# Patient Record
Sex: MALE | Race: OTHER | Employment: FULL TIME | URBAN - METROPOLITAN AREA
[De-identification: names, ages, dates, MRNs, and addresses within clinical notes are randomized per-mention and may not be internally consistent; named-entity substitution may affect disease eponyms.]

---

## 2018-12-20 PROBLEM — J84.10 PULMONARY FIBROSIS (HCC): Status: ACTIVE | Noted: 2018-12-20

## 2018-12-20 PROBLEM — J84.89 INTERSTITIAL PNEUMONITIS (HCC): Status: ACTIVE | Noted: 2018-12-20

## 2018-12-20 PROBLEM — R05.3 CHRONIC COUGH: Status: ACTIVE | Noted: 2018-12-20

## 2020-03-28 ENCOUNTER — HOSPITAL ENCOUNTER (EMERGENCY)
Age: 65
Discharge: HOME | End: 2020-03-28
Attending: EMERGENCY MEDICINE
Payer: MEDICARE

## 2020-03-28 VITALS
DIASTOLIC BLOOD PRESSURE: 94 MMHG | BODY MASS INDEX: 25.65 KG/M2 | WEIGHT: 158.9 LBS | OXYGEN SATURATION: 98 % | HEART RATE: 96 BPM | TEMPERATURE: 98.5 F | RESPIRATION RATE: 17 BRPM | SYSTOLIC BLOOD PRESSURE: 171 MMHG

## 2020-03-28 PROCEDURE — 99282 EMERGENCY DEPT VISIT SF MDM: CPT

## 2020-03-29 NOTE — ED PROVIDER NOTES
[facility] EMERGENCY DEPT VISIT      Patient Identification  Jenna Elizabeth is a 75 y.o. male. Chief Complaint   Chills (chills started two hours ago/cough x 2 years)      History of Present Illness: This is a  75 y.o. male who presents ambulatory  to the ED with complaints of chills for a few hours. No fever. No headache. No body aches. Has had chronic cough for 2 years which is unchanged today. No sputum production. He has seen pulmonary, ENT, and GI in the past for his chronic cough. He has had PFTs with restrictive defect and CT chest with likely interstitial lung disease. Has not seen pulmonary in 1.5 years. Today he Feels a little short of breath. No chest pain. He is worried about coronavirus. Past Medical History:   Diagnosis Date    Amputation of leg below knee, right, traumatic, complicated, subsequent encounter        History reviewed. No pertinent surgical history. No current facility-administered medications for this encounter. No current outpatient medications on file. No Known Allergies    Social History     Socioeconomic History    Marital status:      Spouse name: Not on file    Number of children: Not on file    Years of education: Not on file    Highest education level: Not on file   Occupational History    Occupation: motel owner    Social Needs    Financial resource strain: Not on file    Food insecurity     Worry: Not on file     Inability: Not on file   [employer] needs     Medical: Not on file     Non-medical: Not on file   Tobacco Use    Smoking status: Never Smoker    Smokeless tobacco: Never Used   Substance and Sexual Activity    Alcohol use:  Yes     Alcohol/week: 1.0 standard drinks     Types: 1 Cans of beer per week    Drug use: No    Sexual activity: Yes     Partners: Female   Lifestyle    Physical activity     Days per week: Not on file     Minutes per session: Not on file    Stress: Not on file   Relationships    Social connections positive bowel sounds. abdomen is nontender. MUSCULOSKELETAL:  Active range of motion of the upper and lower extremities. No edema. Right BKA  NEUROLOGICAL: Awake, alert and oriented x 3. Power intact in the upper and lower extremities. DERMATOLOGIC: No petechiae, rashes, or ecchymoses. ED COURSE AND MEDICAL DECISION MAKING:      Treatment in the department:  Patient received no meds while in the ED. Medical decision making:  Patient presents with chills for a few hours. No other new symptoms. Has chronic cough which has NOT worsened. Mild shortness of breath however he is satting 98% on room air and has no bronchospasm. No known contacts with COVID. No travel. He is over 72 and may have chronic interstitial pneumonitis (although he is on no treatment for this and no longer seeing pulmonary) so has some high risk features. However since he has no fever or worsening cough (no coughing occurred during entire visit) I do not feel he meets criteria for testing. He was asked to monitor symptoms and isolate himself for next several days and return for worsening symptoms. Given the best available information and clinical assessment, I estimate the risk of hospitalization to be greater than the risk of treatment at home. I have explained to the patient that the risk could rapidly change, given precautions for return and instructions on how to minimize being contagious. I estimate there is LOW risk for PULMONARY EMBOLISM, ACUTE CORONARY SYNDROME, CHF, PNEUMONIA, PNEUMOTHORAX,RESPIRATORY FAILURE,  thus I consider the discharge disposition reasonable. Alejandra Nichols and I have discussed the diagnosis and risks, and we agree with discharging home to follow-up with their primary doctor. We also discussed returning to the Emergency Department immediately if new or worsening symptoms occur. Clinical Impression:  1. Chills (without fever)    2.  Chronic cough    3. Elevated blood-pressure reading without diagnosis of hypertension        Dispo:  Patient will be discharged  at this time. Patient was informed of this decision and agrees with plan. I have discussed lab and xray findings with patient and they understand. Questions were answered to the best of my ability. Discharge vitals:  Blood pressure (!) 171/94, pulse 103, temperature 98.5 °F (36.9 °C), temperature source Oral, resp. rate 17, weight 72.1 kg (158 lb 14.4 oz), SpO2 98 %. Prescriptions given:   New Prescriptions    No medications on file         This chart was created using dragon voice recognition software.         Sana Vick MD  03/28/20 3065

## 2020-03-30 ENCOUNTER — CARE COORDINATION (OUTPATIENT)
Dept: CARE COORDINATION | Age: 65
End: 2020-03-30

## 2020-03-30 NOTE — CARE COORDINATION
Call to patient , unable to LM , received message \" user busy \" when attempting to call   Mobile number was wrong number.

## 2020-03-31 ENCOUNTER — CARE COORDINATION (OUTPATIENT)
Dept: CARE COORDINATION | Age: 65
End: 2020-03-31

## 2021-04-07 ENCOUNTER — HOSPITAL ENCOUNTER (EMERGENCY)
Age: 66
Discharge: HOME | End: 2021-04-07
Attending: EMERGENCY MEDICINE
Payer: MEDICARE

## 2021-04-07 ENCOUNTER — APPOINTMENT (OUTPATIENT)
Dept: GENERAL RADIOLOGY | Age: 66
End: 2021-04-07
Payer: MEDICARE

## 2021-04-07 VITALS
RESPIRATION RATE: 20 BRPM | DIASTOLIC BLOOD PRESSURE: 63 MMHG | WEIGHT: 152 LBS | BODY MASS INDEX: 24.53 KG/M2 | TEMPERATURE: 97.5 F | HEART RATE: 108 BPM | SYSTOLIC BLOOD PRESSURE: 113 MMHG | OXYGEN SATURATION: 94 %

## 2021-04-07 DIAGNOSIS — J84.9 INTERSTITIAL LUNG DISEASE (HCC): Primary | ICD-10-CM

## 2021-04-07 LAB
A/G RATIO: 0.9 (ref 1.1–2.2)
ALBUMIN SERPL-MCNC: 3.4 G/DL (ref 3.4–5)
ALP BLD-CCNC: 140 U/L (ref 40–129)
ALT SERPL-CCNC: 26 U/L (ref 10–40)
ANION GAP SERPL CALCULATED.3IONS-SCNC: 9 MMOL/L (ref 3–16)
AST SERPL-CCNC: 34 U/L (ref 15–37)
BACTERIA: ABNORMAL /HPF
BASOPHILS ABSOLUTE: 0 K/UL (ref 0–0.2)
BASOPHILS RELATIVE PERCENT: 0.4 %
BILIRUB SERPL-MCNC: 0.4 MG/DL (ref 0–1)
BILIRUBIN URINE: NEGATIVE
BLOOD, URINE: ABNORMAL
BUN BLDV-MCNC: 13 MG/DL (ref 7–20)
CALCIUM SERPL-MCNC: 8.9 MG/DL (ref 8.3–10.6)
CHLORIDE BLD-SCNC: 101 MMOL/L (ref 99–110)
CLARITY: CLEAR
CO2: 23 MMOL/L (ref 21–32)
COLOR: YELLOW
CREAT SERPL-MCNC: 0.9 MG/DL (ref 0.8–1.3)
EOSINOPHILS ABSOLUTE: 0.1 K/UL (ref 0–0.6)
EOSINOPHILS RELATIVE PERCENT: 1.6 %
EPITHELIAL CELLS, UA: ABNORMAL /HPF (ref 0–5)
GFR AFRICAN AMERICAN: >60
GFR NON-AFRICAN AMERICAN: >60
GLOBULIN: 3.6 G/DL
GLUCOSE BLD-MCNC: 130 MG/DL (ref 70–99)
GLUCOSE URINE: 100 MG/DL
HCT VFR BLD CALC: 31 % (ref 40.5–52.5)
HEMOGLOBIN: 10.1 G/DL (ref 13.5–17.5)
KETONES, URINE: NEGATIVE MG/DL
LEUKOCYTE ESTERASE, URINE: NEGATIVE
LIPASE: 31 U/L (ref 13–60)
LYMPHOCYTES ABSOLUTE: 1.3 K/UL (ref 1–5.1)
LYMPHOCYTES RELATIVE PERCENT: 18.7 %
MCH RBC QN AUTO: 31.1 PG (ref 26–34)
MCHC RBC AUTO-ENTMCNC: 32.7 G/DL (ref 31–36)
MCV RBC AUTO: 95.2 FL (ref 80–100)
MICROSCOPIC EXAMINATION: YES
MONOCYTES ABSOLUTE: 1.1 K/UL (ref 0–1.3)
MONOCYTES RELATIVE PERCENT: 15.5 %
NEUTROPHILS ABSOLUTE: 4.5 K/UL (ref 1.7–7.7)
NEUTROPHILS RELATIVE PERCENT: 63.8 %
NITRITE, URINE: NEGATIVE
PDW BLD-RTO: 16 % (ref 12.4–15.4)
PH UA: 5 (ref 5–8)
PLATELET # BLD: 272 K/UL (ref 135–450)
PMV BLD AUTO: 7.4 FL (ref 5–10.5)
POTASSIUM REFLEX MAGNESIUM: 3.8 MMOL/L (ref 3.5–5.1)
PRO-BNP: 69 PG/ML (ref 0–124)
PROTEIN UA: NEGATIVE MG/DL
RBC # BLD: 3.26 M/UL (ref 4.2–5.9)
RBC UA: ABNORMAL /HPF (ref 0–4)
SODIUM BLD-SCNC: 133 MMOL/L (ref 136–145)
SPECIFIC GRAVITY UA: 1.02 (ref 1–1.03)
TOTAL PROTEIN: 7 G/DL (ref 6.4–8.2)
TROPONIN: <0.01 NG/ML
URINE REFLEX TO CULTURE: ABNORMAL
URINE TYPE: ABNORMAL
UROBILINOGEN, URINE: 0.2 E.U./DL
WBC # BLD: 7 K/UL (ref 4–11)
WBC UA: ABNORMAL /HPF (ref 0–5)

## 2021-04-07 PROCEDURE — 80053 COMPREHEN METABOLIC PANEL: CPT

## 2021-04-07 PROCEDURE — 93005 ELECTROCARDIOGRAM TRACING: CPT | Performed by: EMERGENCY MEDICINE

## 2021-04-07 PROCEDURE — 94640 AIRWAY INHALATION TREATMENT: CPT

## 2021-04-07 PROCEDURE — 85025 COMPLETE CBC W/AUTO DIFF WBC: CPT

## 2021-04-07 PROCEDURE — 83880 ASSAY OF NATRIURETIC PEPTIDE: CPT

## 2021-04-07 PROCEDURE — 83690 ASSAY OF LIPASE: CPT

## 2021-04-07 PROCEDURE — 71045 X-RAY EXAM CHEST 1 VIEW: CPT

## 2021-04-07 PROCEDURE — 84484 ASSAY OF TROPONIN QUANT: CPT

## 2021-04-07 PROCEDURE — 6370000000 HC RX 637 (ALT 250 FOR IP): Performed by: EMERGENCY MEDICINE

## 2021-04-07 PROCEDURE — 81001 URINALYSIS AUTO W/SCOPE: CPT

## 2021-04-07 PROCEDURE — 99283 EMERGENCY DEPT VISIT LOW MDM: CPT

## 2021-04-07 RX ORDER — PREDNISONE 10 MG/1
60 TABLET ORAL DAILY
Qty: 30 TABLET | Refills: 0 | Status: SHIPPED | OUTPATIENT
Start: 2021-04-07 | End: 2021-04-12

## 2021-04-07 RX ORDER — IPRATROPIUM BROMIDE AND ALBUTEROL SULFATE 2.5; .5 MG/3ML; MG/3ML
1 SOLUTION RESPIRATORY (INHALATION) ONCE
Status: COMPLETED | OUTPATIENT
Start: 2021-04-07 | End: 2021-04-07

## 2021-04-07 RX ORDER — BENZONATATE 100 MG/1
100 CAPSULE ORAL 3 TIMES DAILY PRN
Qty: 30 CAPSULE | Refills: 0 | Status: SHIPPED | OUTPATIENT
Start: 2021-04-07 | End: 2021-04-14

## 2021-04-07 RX ORDER — ALBUTEROL SULFATE 90 UG/1
2 AEROSOL, METERED RESPIRATORY (INHALATION) EVERY 6 HOURS PRN
Qty: 1 INHALER | Refills: 3 | Status: ON HOLD | OUTPATIENT
Start: 2021-04-07 | End: 2021-05-22 | Stop reason: HOSPADM

## 2021-04-07 RX ADMIN — IPRATROPIUM BROMIDE AND ALBUTEROL SULFATE 1 AMPULE: .5; 3 SOLUTION RESPIRATORY (INHALATION) at 22:49

## 2021-04-08 LAB
EKG ATRIAL RATE: 106 BPM
EKG DIAGNOSIS: NORMAL
EKG P AXIS: -2 DEGREES
EKG P-R INTERVAL: 200 MS
EKG Q-T INTERVAL: 334 MS
EKG QRS DURATION: 88 MS
EKG QTC CALCULATION (BAZETT): 443 MS
EKG R AXIS: 34 DEGREES
EKG T AXIS: 18 DEGREES
EKG VENTRICULAR RATE: 106 BPM

## 2021-04-08 PROCEDURE — 93010 ELECTROCARDIOGRAM REPORT: CPT | Performed by: INTERNAL MEDICINE

## 2021-04-08 NOTE — ED PROVIDER NOTES
[facility]   e[facility]GENCY dEPARTMENT eNCOUnter      Pt Name: Regino Vila  MRN: [id number]  [place] 1955  Date of evaluation: 4/7/2021  Provider: Patel Celaya MD  PCP: Jayashree Perez MD      CHIEF COMPLAINT       Shortness of breath and cough    HISTORY OFPRESENT ILLNESS   (Location/Symptom, Timing/Onset, Context/Setting, Quality, Duration, Modifying Factors,Severity)  Note limiting factors. Regino Vila is a 77 y.o. male planes it for many weeks now he has felt short of breath with exertion he came in today because he feels a little bit more short of breath with exertion and he has when he coughs he has a pain in his abdomen he denies any history of any medical problems in the past although he does have a BKA, denies fever chills nausea vomiting denies chest pain denies any headache    Nursing Notes were all reviewed and agreed with or any disagreements were addressed  in the HPI. REVIEW OF SYSTEMS    (2-9 systems for level 4, 10 or more for level 5)     Review of Systems    Positives and Pertinent negatives as per HPI. Except as noted above in the ROS, all other systems were reviewed andnegative. PASTMEDICAL HISTORY     Past Medical History:   Diagnosis Date    Amputation of leg below knee, right, traumatic, complicated, subsequent encounter          SURGICAL HISTORY     No past surgical history on file. CURRENT MEDICATIONS       Previous Medications    No medications on file       ALLERGIES     Patient has no known allergies. FAMILY HISTORY     No family history on file.        SOCIAL HISTORY       Social History     Socioeconomic History    Marital status:      Spouse name: Not on file    Number of children: Not on file    Years of education: Not on file    Highest education level: Not on file   Occupational History    Occupation: motel owner    Social Needs    Financial resource strain: Not on file   [employer] insecurity     Worry: Not on file     Inability: Not on file    Transportation needs     Medical: Not on file     Non-medical: Not on file   Tobacco Use    Smoking status: Never Smoker    Smokeless tobacco: Never Used   Substance and Sexual Activity    Alcohol use: Yes     Alcohol/week: 1.0 standard drinks     Types: 1 Cans of beer per week    Drug use: No    Sexual activity: Yes     Partners: Female   Lifestyle    Physical activity     Days per week: Not on file     Minutes per session: Not on file    Stress: Not on file   Relationships    Social connections     Talks on phone: Not on file     Gets together: Not on file     Attends Protestant service: Not on file     Active member of club or organization: Not on file     Attends meetings of clubs or organizations: Not on file     Relationship status: Not on file    Intimate partner violence     Fear of current or ex partner: Not on file     Emotionally abused: Not on file     Physically abused: Not on file     Forced sexual activity: Not on file   Other Topics Concern    Not on file   Social History Narrative    Not on file       SCREENINGS      @[id number])@      PHYSICAL EXAM    (up to 7 for level 4, 8 or more for level 5)     ED Triage Vitals [04/07/21 2133]   BP Temp Temp Source Pulse Resp SpO2 Height Weight   136/81 97.5 °F (36.4 °C) Oral 112 18 92 % -- 152 lb (68.9 kg)       Physical Exam      General Appearance:  Alert, cooperative, no distress, appears stated age. Head:  Normocephalic, without obviousabnormality, atraumatic. Eyes:  conjunctiva/corneas clear, EOM's intact. Sclera anicteric. ENT: Mucous membranes moist.   Neck: Supple, symmetrical, trachea midline, no adenopathy. No jugular venous distention. Lungs:   Clear to auscultation bilaterally, respirationsunlabored. No rales, rhonchi or wheezes. Chest Wall:  No tenderness. Heart:  Regular rate and rhythm, S1 and S2 normal, no murmur, rub or gallop.    Abdomen:   Soft, non-tender, bowel sounds active,   no masses, no organomegaly. Extremities: No edema, cords or calf tenderness. Full range of motion. Pulses: 2+ and symmetric   Skin: Turgor is normal, no rashes or lesions. Neurologic: Alert and oriented X 3. No focal findings.   Motor grossly normal.  Speech clear, no drift, CN III-XII grossly intact,        DIAGNOSTIC RESULTS   LABS:    Labs Reviewed   CBC WITH AUTO DIFFERENTIAL - Abnormal; Notable for the following components:       Result Value    RBC 3.26 (*)     Hemoglobin 10.1 (*)     Hematocrit 31.0 (*)     RDW 16.0 (*)     All other components within normal limits    Narrative:     Performed at:  [address] [facility]   Phone [phone]   COMPREHENSIVE METABOLIC PANEL W/ REFLEX TO MG FOR LOW K - Abnormal; Notable for the following components:    Sodium 133 (*)     Glucose 130 (*)     Albumin/Globulin Ratio 0.9 (*)     Alkaline Phosphatase 140 (*)     All other components within normal limits    Narrative:     Performed at:  [address] [facility]   Phone [phone]   URINE RT REFLEX TO CULTURE - Abnormal; Notable for the following components:    Glucose, Ur 100 (*)     Blood, Urine TRACE-INTACT (*)     All other components within normal limits    Narrative:     Performed at:  [address] [facility]   Phone [phone]   MICROSCOPIC URINALYSIS - Abnormal; Notable for the following components:    Bacteria, UA Rare (*)     All other components within normal limits    Narrative:     Performed at:  [address] [facility]   Phone [phone]   LIPASE    Narrative:     Performed at:  [address] [facility]   Phone [phone]   TROPONIN    Narrative:     Performed at:  [facility]) - [facility]  [address],  [address]   Phone [phone] PEPTIDE    Narrative:     Performed at:  [facility]) - [facility]  [address],  [address]   Phone [phone]       All other labs were within normal range or not returned as of this dictation. EKG: All EKG's are interpreted by the Emergency Department Physician who eithersigns or Co-signs this chart in the absence of a cardiologist.    The Ekg interpreted by me in the absence of a cardiologist shows. Normal Sinus rhythm   Rate of   106  Axis is   Normal  QTc is  within an acceptable range  Intervals and Durations are unremarkable. Nonspecific ST-T wave changes appreciated. No evidence of acute ischemia. RADIOLOGY:   Non-plain film images such as CT, Ultrasound and MRI are read by the radiologist. Plain radiographic images are visualized by myself. *    Interpretation per the Radiologist below, if available at the time of this note:    XR CHEST PORTABLE   Final Result      Diffuse accentuation of pulmonary interstitial markings throughout both lungs, which may be either acute or chronic. Interstitial pneumonitis, or possible underlying fibrosis can give this appearance. Limited inspiration. Correlate clinically.                PROCEDURES   Unless otherwise noted below, none     Procedures    *    CRITICAL CARE TIME   N/A      EMERGENCY DEPARTMENT COURSE and DIFFERENTIALDIAGNOSIS/MDM:   Vitals:    Vitals:    04/07/21 2133 04/07/21 2250   BP: 136/81    Pulse: 112    Resp: 18 18   Temp: 97.5 °F (36.4 °C)    TempSrc: Oral    SpO2: 92% 100%   Weight: 152 lb (68.9 kg)        Patient was given thefollowing medications:  Medications   ipratropium-albuterol (DUONEB) nebulizer solution 1 ampule (1 ampule Inhalation Given 4/7/21 2227)           The patient tolerated

## 2021-04-08 NOTE — ED NOTES
Patient states that he feels better. Patient given d/c instructions with return verbalization including three medications. Emphasis on f/u,  Given number to [facility]. Has no PCP currently. Son with patient for d/c instructions. To return with worsening s/s.      Cornelius Schwartz RN  04/07/21 9261

## 2021-04-12 PROBLEM — J84.89 INTERSTITIAL PNEUMONITIS (HCC): Status: RESOLVED | Noted: 2018-12-20 | Resolved: 2021-04-12

## 2021-04-12 NOTE — PATIENT INSTRUCTIONS
Patient Education        Well Visit, Over 72: Care Instructions  Overview     Well visits can help you stay healthy. Your doctor has checked your overall health and may have suggested ways to take good care of yourself. Your doctor also may have recommended tests. At home, you can help prevent illness with healthy eating, regular exercise, and other steps. Follow-up care is a key part of your treatment and safety. Be sure to make and go to all appointments, and call your doctor if you are having problems. It's also a good idea to know your test results and keep a list of the medicines you take. How can you care for yourself at home? · Get screening tests that you and your doctor decide on. Screening helps find diseases before any symptoms appear. · Eat healthy foods. Choose fruits, vegetables, whole grains, protein, and low-fat dairy foods. Limit fat, especially saturated fat. Reduce salt in your diet. · Limit alcohol. If you are a man, have no more than 2 drinks a day or 14 drinks a week. If you are a woman, have no more than 1 drink a day or 7 drinks a week. Since alcohol affects older adults differently, you may want to limit alcohol even more. Or you may not want to drink at all. · Get at least 30 minutes of exercise on most days of the week. Walking is a good choice. You also may want to do other activities, such as running, swimming, cycling, or playing tennis or team sports. · Reach and stay at a healthy weight. This will lower your risk for many problems, such as obesity, diabetes, heart disease, and high blood pressure. · Do not smoke. Smoking can make health problems worse. If you need help quitting, talk to your doctor about stop-smoking programs and medicines. These can increase your chances of quitting for good. · Care for your mental health. It is easy to get weighed down by worry and stress.  Learn strategies to manage stress, like deep breathing and mindfulness, and stay connected with your family and community. If you find you often feel sad or hopeless, talk with your doctor. Treatment can help. · Talk to your doctor about whether you have any risk factors for sexually transmitted infections (STIs). You can help prevent STIs if you wait to have sex with a new partner (or partners) until you've each been tested for STIs. It also helps if you use condoms (male or female condoms) and if you limit your sex partners to one person who only has sex with you. Vaccines are available for some STIs. · If you think you may have a problem with alcohol or drug use, talk to your doctor. This includes prescription medicines (such as amphetamines and opioids) and illegal drugs (such as cocaine and methamphetamine). Your doctor can help you figure out what type of treatment is best for you. · Protect your skin from too much sun. When you're outdoors from 10 a.m. to 4 p.m., stay in the shade or cover up with clothing and a hat with a wide brim. Wear sunglasses that block UV rays. Even when it's cloudy, put broad-spectrum sunscreen (SPF 30 or higher) on any exposed skin. · See a dentist one or two times a year for checkups and to have your teeth cleaned. · Wear a seat belt in the car. When should you call for help? Watch closely for changes in your health, and be sure to contact your doctor if you have any problems or symptoms that concern you. Where can you learn more? Go to https://[employer].healthWikirinpartners. org and sign in to your hulu account. Enter C663 in the Peonut box to learn more about \"Well Visit, Over 65: Care Instructions. \"     If you do not have an account, please click on the \"Sign Up Now\" link. Current as of: May 27, 2020               Content Version: 12.8  © 0139-0103 Healthwise, Incorporated. Care instructions adapted under license by [facility] ([facility]).  If you have questions about a medical condition or this instruction, always ask your healthcare professional. [employer] disclaims any warranty or liability for your use of this information.

## 2021-04-13 ENCOUNTER — HOSPITAL ENCOUNTER (OUTPATIENT)
Age: 66
Discharge: HOME | End: 2021-04-13
Payer: MEDICARE

## 2021-04-13 ENCOUNTER — HOSPITAL ENCOUNTER (OUTPATIENT)
Dept: GENERAL RADIOLOGY | Age: 66
Discharge: HOME | End: 2021-04-13
Payer: MEDICARE

## 2021-04-13 ENCOUNTER — OFFICE VISIT (OUTPATIENT)
Dept: PRIMARY CARE CLINIC | Age: 66
End: 2021-04-13
Payer: MEDICARE

## 2021-04-13 VITALS
HEIGHT: 66 IN | WEIGHT: 155 LBS | BODY MASS INDEX: 24.91 KG/M2 | DIASTOLIC BLOOD PRESSURE: 83 MMHG | SYSTOLIC BLOOD PRESSURE: 138 MMHG | HEART RATE: 106 BPM | OXYGEN SATURATION: 94 % | TEMPERATURE: 97.7 F

## 2021-04-13 DIAGNOSIS — M54.50 CHRONIC BILATERAL LOW BACK PAIN WITHOUT SCIATICA: ICD-10-CM

## 2021-04-13 DIAGNOSIS — G89.29 CHRONIC BILATERAL LOW BACK PAIN WITHOUT SCIATICA: ICD-10-CM

## 2021-04-13 DIAGNOSIS — J84.10 PULMONARY FIBROSIS (HCC): Primary | ICD-10-CM

## 2021-04-13 PROCEDURE — 1036F TOBACCO NON-USER: CPT | Performed by: STUDENT IN AN ORGANIZED HEALTH CARE EDUCATION/TRAINING PROGRAM

## 2021-04-13 PROCEDURE — 3017F COLORECTAL CA SCREEN DOC REV: CPT | Performed by: STUDENT IN AN ORGANIZED HEALTH CARE EDUCATION/TRAINING PROGRAM

## 2021-04-13 PROCEDURE — G8417 CALC BMI ABV UP PARAM F/U: HCPCS | Performed by: STUDENT IN AN ORGANIZED HEALTH CARE EDUCATION/TRAINING PROGRAM

## 2021-04-13 PROCEDURE — 1123F ACP DISCUSS/DSCN MKR DOCD: CPT | Performed by: STUDENT IN AN ORGANIZED HEALTH CARE EDUCATION/TRAINING PROGRAM

## 2021-04-13 PROCEDURE — 4040F PNEUMOC VAC/ADMIN/RCVD: CPT | Performed by: STUDENT IN AN ORGANIZED HEALTH CARE EDUCATION/TRAINING PROGRAM

## 2021-04-13 PROCEDURE — G8427 DOCREV CUR MEDS BY ELIG CLIN: HCPCS | Performed by: STUDENT IN AN ORGANIZED HEALTH CARE EDUCATION/TRAINING PROGRAM

## 2021-04-13 PROCEDURE — 99204 OFFICE O/P NEW MOD 45 MIN: CPT | Performed by: STUDENT IN AN ORGANIZED HEALTH CARE EDUCATION/TRAINING PROGRAM

## 2021-04-13 PROCEDURE — 72100 X-RAY EXAM L-S SPINE 2/3 VWS: CPT

## 2021-04-13 RX ORDER — PREDNISONE 1 MG/1
5 TABLET ORAL DAILY
Qty: 10 TABLET | Refills: 0 | Status: SHIPPED | OUTPATIENT
Start: 2021-04-13 | End: 2021-04-23

## 2021-04-13 RX ORDER — PREDNISONE 10 MG/1
10 TABLET ORAL DAILY
Qty: 10 TABLET | Refills: 0 | Status: SHIPPED | OUTPATIENT
Start: 2021-04-13 | End: 2021-04-23

## 2021-04-13 RX ORDER — PREDNISONE 20 MG/1
20 TABLET ORAL DAILY
Qty: 10 TABLET | Refills: 0 | Status: SHIPPED | OUTPATIENT
Start: 2021-04-13 | End: 2021-04-23

## 2021-04-13 ASSESSMENT — PATIENT HEALTH QUESTIONNAIRE - PHQ9
SUM OF ALL RESPONSES TO PHQ QUESTIONS 1-9: 0
SUM OF ALL RESPONSES TO PHQ QUESTIONS 1-9: 0
SUM OF ALL RESPONSES TO PHQ9 QUESTIONS 1 & 2: 0
SUM OF ALL RESPONSES TO PHQ QUESTIONS 1-9: 0
2. FEELING DOWN, DEPRESSED OR HOPELESS: 0

## 2021-04-13 ASSESSMENT — ENCOUNTER SYMPTOMS
APNEA: 0
COUGH: 1
STRIDOR: 0
BACK PAIN: 1
CHEST TIGHTNESS: 1
WHEEZING: 0
SHORTNESS OF BREATH: 1

## 2021-04-13 NOTE — PROGRESS NOTES
2021     Fredy Ambrocio (:  1955) is a 77 y.o. male, here for evaluation of the following medical concerns:    HPI  Shortness of breath:   Patient presents today for evaluation of shortness of breath and cough. He was seen in the emergency department last week with similar symptoms. At that time he had a chest x-ray, which demonstrated no acute finding, but did demonstrate findings consistent with his diagnosis of pulmonary fibrosis. He was started on a course of steroids, which quickly improved his cough. He finished his steroids over the weekend, but his cough returned shortly afterwards. Today he complains of cough and worsening shortness of breath. He can typically walk at a slow pace for prolonged distances with minimal shortness of breath, but currently taking deep breaths causes him to cough and he can only walk 10 or 20 feet without having to rest due to shortness of breath. He denies fevers, chills, sputum production. Review of Systems   Constitutional: Positive for activity change (Due to shortness of breath). Negative for fatigue and fever. Respiratory: Positive for cough, chest tightness and shortness of breath. Negative for apnea, wheezing and stridor. Cardiovascular: Negative for chest pain and palpitations. Musculoskeletal: Positive for back pain and gait problem (Prosthetic leg). Neurological: Negative for dizziness, weakness and light-headedness. Prior to Visit Medications    Medication Sig Taking?  Authorizing Provider   predniSONE (DELTASONE) 20 MG tablet Take 1 tablet by mouth daily for 10 days Yes Jenna Davidson DO   predniSONE (DELTASONE) 10 MG tablet Take 1 tablet by mouth daily for 10 days Yes Jenna Davidson DO   predniSONE (DELTASONE) 10 MG tablet Take 1 tablet by mouth daily for 10 days Yes Jenna Davidson DO   predniSONE (DELTASONE) 5 MG tablet Take 1 tablet by mouth daily for 10 days Yes Jenna Davidson DO   albuterol sulfate HFA (PROVENTIL HFA) 108 (90 Base) MCG/ACT inhaler Inhale 2 puffs into the lungs every 6 hours as needed for Wheezing With spacer Yes Edgard James MD   benzonatate (TESSALON PERLES) 100 MG capsule Take 1 capsule by mouth 3 times daily as needed for Cough Yes Edgard James MD        Social History     Tobacco Use    Smoking status: Never Smoker    Smokeless tobacco: Never Used   Substance Use Topics    Alcohol use: Yes     Alcohol/week: 1.0 standard drinks     Types: 1 Cans of beer per week        Vitals:    04/13/21 1316 04/13/21 1320   BP: (!) 148/89 138/83   Pulse: 106    Temp: 97.7 °F (36.5 °C)    SpO2: 94%    Weight: 155 lb (70.3 kg)    Height: 5' 6\" (1.676 m)      Estimated body mass index is 25.02 kg/m² as calculated from the following:    Height as of this encounter: 5' 6\" (1.676 m). Weight as of this encounter: 155 lb (70.3 kg). Physical Exam  Constitutional:       General: He is not in acute distress. Appearance: Normal appearance. He is normal weight. He is not ill-appearing. HENT:      Head: Normocephalic and atraumatic. Right Ear: Tympanic membrane normal.      Left Ear: Tympanic membrane normal.      Nose: Nose normal.      Mouth/Throat:      Mouth: Mucous membranes are moist.      Pharynx: Oropharynx is clear. Eyes:      Conjunctiva/sclera: Conjunctivae normal.   Cardiovascular:      Rate and Rhythm: Regular rhythm. Tachycardia present. Pulmonary:      Effort: Pulmonary effort is normal. No respiratory distress. Breath sounds: No wheezing or rales. Comments: Diminished breath sounds throughout  Abdominal:      General: Abdomen is flat. Bowel sounds are normal.      Palpations: Abdomen is soft. Musculoskeletal:         General: Deformity (Right lower extremity amputation) present. Comments: Lumbar spine is extremely rigid, range of motion is reduced in all directions, but most notably in flexion   Lymphadenopathy:      Cervical: No cervical adenopathy.    Skin: General: Skin is warm. Neurological:      Mental Status: He is alert. Mental status is at baseline. Psychiatric:         Mood and Affect: Mood normal.         ASSESSMENT/PLAN:  1.  Pulmonary fibrosis with pulmonary exacerbation [facility]): Patient presents with acute shortness of breath and cough. Short steroid burst in the emergency department did improve his symptoms, but the taper was not long enough. Will resume steroids today with a prolonged taper. He follows with outside pulmonology, but they are frustrated with her care. They feel like they should have more routine follow-up. He currently is seen once a year. They also feel like his breathing is worsening. Long conversation with patient and son about the chronic progression of pulmonary fibrosis. They have not had this conversation before. Will refer him to Dr. Heidi Sheppard in [place]. - Jan Jimenes MD, Pulmonary, [place]  - predniSONE (DELTASONE) 20 MG tablet; Take 1 tablet by mouth daily for 10 days  Dispense: 10 tablet; Refill: 0  - predniSONE (DELTASONE) 10 MG tablet; Take 1 tablet by mouth daily for 10 days  Dispense: 10 tablet; Refill: 0  - predniSONE (DELTASONE) 10 MG tablet; Take 1 tablet by mouth daily for 10 days  Dispense: 10 tablet; Refill: 0  - predniSONE (DELTASONE) 5 MG tablet; Take 1 tablet by mouth daily for 10 days  Dispense: 10 tablet; Refill: 0    2. Chronic bilateral low back pain without sciatica: Extremely limited mobility in the lumbar spine. Will check x-ray and refer to physical therapy for strengthening and mobility training.  - XR LUMBAR SPINE (2-3 VIEWS); Future  - [facility] - [facility]      Return in about 4 weeks (around 5/11/2021) for COPD. An electronic signature was used to authenticate this note.     --iLsa Cavanaugh,  on 4/13/2021 at 2:01 PM

## 2021-04-13 NOTE — PROGRESS NOTES
2021    Jeramie Estes (:  1955) is a 77 y.o. male, here for evaluation of the following medical concerns:    HPI    Well Adult Physical: Patient here for a comprehensive physical exam.The patient reports {problems:65257}  Do you take any herbs or supplements that were not prescribed by a doctor? {yes/no/not asked:9010} Are you taking calcium supplements? {yes/no:63} Are you taking aspirin daily? {yes/no:63}    Sexual activity: {Persons; sexual partners:705}   Diet: {JOHNNY:19490}  Exercise: {EXERCISE LQNU:336599608}  Seatbelt use: {YES/NO:}    Review of Systems    Prior to Visit Medications    Medication Sig Taking? Authorizing Provider   albuterol sulfate HFA (PROVENTIL HFA) 108 (90 Base) MCG/ACT inhaler Inhale 2 puffs into the lungs every 6 hours as needed for Wheezing With spacer Yes Jovi Dorman MD   benzonatate (TESSALON PERLES) 100 MG capsule Take 1 capsule by mouth 3 times daily as needed for Cough Yes Jovi Dorman MD        No Known Allergies    Past Medical History:   Diagnosis Date    Amputation of leg below knee, right, traumatic, complicated, subsequent encounter        History reviewed. No pertinent surgical history. Social History     Socioeconomic History    Marital status:      Spouse name: Not on file    Number of children: Not on file    Years of education: Not on file    Highest education level: Not on file   Occupational History    Occupation: motel owner    Social Needs    Financial resource strain: Not on file    Food insecurity     Worry: Not on file     Inability: Not on file   [employer] needs     Medical: Not on file     Non-medical: Not on file   Tobacco Use    Smoking status: Never Smoker    Smokeless tobacco: Never Used   Substance and Sexual Activity    Alcohol use:  Yes     Alcohol/week: 1.0 standard drinks     Types: 1 Cans of beer per week    Drug use: No    Sexual activity: Yes     Partners: Female Lifestyle    Physical activity     Days per week: Not on file     Minutes per session: Not on file    Stress: Not on file   Relationships    Social connections     Talks on phone: Not on file     Gets together: Not on file     Attends [employer] service: Not on file     Active member of club or organization: Not on file     Attends meetings of clubs or organizations: Not on file     Relationship status: Not on file    Intimate partner violence     Fear of current or ex partner: Not on file     Emotionally abused: Not on file     Physically abused: Not on file     Forced sexual activity: Not on file   Other Topics Concern    Not on file   Social History Narrative    Not on file        History reviewed. No pertinent family history. Vitals:    04/13/21 1316 04/13/21 1320   BP: (!) 148/89 (!) 138/93   Pulse: 106    Temp: 97.7 °F (36.5 °C)    SpO2: 94%    Weight: 155 lb (70.3 kg)    Height: 5' 6\" (1.676 m)      Estimated body mass index is 25.02 kg/m² as calculated from the following:    Height as of this encounter: 5' 6\" (1.676 m). Weight as of this encounter: 155 lb (70.3 kg). Physical Exam    Separate Identifiable issues addressed today:  {Visit Chronic CHP (Optional):17878}    ASSESSMENT/PLAN:  Kristi Gibbons was seen today for other. Diagnoses and all orders for this visit:    Encounter to establish care with new doctor    Pulmonary fibrosis ([facility] [address]    Need for shingles vaccine        Return in about 3 months (around 7/13/2021) for COPD. An  electronic signature was used to authenticate this note.     --Renetta Kang DO on 4/13/2021 at 1:27 PM

## 2021-04-19 ENCOUNTER — HOSPITAL ENCOUNTER (OUTPATIENT)
Dept: PHYSICAL THERAPY | Age: 66
Setting detail: THERAPIES SERIES
Discharge: HOME | End: 2021-04-19
Payer: MEDICARE

## 2021-04-19 ENCOUNTER — APPOINTMENT (OUTPATIENT)
Dept: PHYSICAL THERAPY | Age: 66
End: 2021-04-19
Payer: MEDICARE

## 2021-04-19 PROCEDURE — 97110 THERAPEUTIC EXERCISES: CPT | Performed by: PHYSICAL THERAPIST

## 2021-04-19 PROCEDURE — G0283 ELEC STIM OTHER THAN WOUND: HCPCS | Performed by: PHYSICAL THERAPIST

## 2021-04-19 PROCEDURE — 97140 MANUAL THERAPY 1/> REGIONS: CPT | Performed by: PHYSICAL THERAPIST

## 2021-04-19 PROCEDURE — 97161 PT EVAL LOW COMPLEX 20 MIN: CPT | Performed by: PHYSICAL THERAPIST

## 2021-04-21 NOTE — FLOWSHEET NOTE
[facility] Orthopaedics and Sports Rehabilitation; [facility]         Physical Therapy Treatment Note/ Progress Report:           Date:  2021  Patient Name:  Regino Vila    :  1955  MRN: [id number]  Restrictions/Precautions:    Medical/Treatment Diagnosis Information:  · Diagnosis: lbp - m54.5  · Treatment Diagnosis: lbp - R92.7  Insurance/Certification information:     Physician Information:  Referring Practitioner: dr Terrace Snellen  Has the plan of care been signed (Y/N):        []  Yes  [x]  No     Date of Patient follow up with Physician:       Is this a Progress Report:     []  Yes  [x]  No        If Yes:  Date Range for reporting period:  Beginning  Ending    Progress report will be due (10 Rx or 30 days whichever is less):        Recertification will be due (POC Duration  / 90 days whichever is less):        Visit # Insurance Allowable Auth Required   1  []  Yes []  No        Functional Scale:    Date assessed:        Latex Allergy:  [x]NO      []YES  Preferred Language for Healthcare:   [x]English       []other:      Pain level:  7/10     SUBJECTIVE:  See eval    OBJECTIVE: See eval   Observation:    Test measurements:      RESTRICTIONS/PRECAUTIONS:     Exercises/Interventions:       Therapeutic Ex (19137) Sets/sec Reps Notes/CUES   ktc   10 x 10\"          Supine piriformis   4 x 30\" B     Supine abd brace   20 x 10\"                                                     Manual Intervention ([address] [facility])      Prom/stm   15'                                                                                                                                             Therapeutic Exercise and NMR EXR  [x] (16539) Provided verbal/tactile cueing for activities related to strengthening, flexibility, endurance, ROM for improvements in LE, proximal hip, and core control with self care, mobility, lifting, ambulation.   [x] (20535) Provided verbal/tactile cueing for activities related to improving balance, coordination, kinesthetic sense, posture, motor skill, proprioception to assist with LE, proximal hip, and core control in self-care, mobility, lifting, ambulation and eccentric single leg control. NMR and Therapeutic Activities:    [x] (26885 or 86692) Provided verbal/tactile cueing for activities related to improving balance, coordination, kinesthetic sense, posture, motor skill, proprioception and motor activation to allow for proper function of core, proximal hip and LE with self-care and ADLs and functional mobility.   [] (11657) Gait Re-education- Provided training and instruction to the patient for proper LE, core and proximal hip recruitment and positioning and eccentric body weight control with ambulation re-education including up and down stairs     Home Exercise Program:    [x] (96763) Reviewed/Progressed HEP activities related to strengthening, flexibility, endurance, ROM of core, proximal hip and LE for functional self-care, mobility, lifting and ambulation/stair navigation   [] (15104) Reviewed/Progressed HEP activities related to improving balance, coordination, kinesthetic sense, posture, motor skill, proprioception of core, proximal hip and LE for self-care, mobility, lifting, and ambulation/stair navigation      Manual Treatments:  PROM / STM / Oscillations-Mobs:  G-I, II, III, IV (PA's, Inf., Post.)  [x] (17928) Provided manual therapy to mobilize LE, proximal hip and/or LS spine soft tissue/joints for the purpose of modulating pain, promoting relaxation, increasing ROM, reducing/eliminating soft tissue swelling/inflammation/restriction, improving soft tissue extensibility and allowing for proper ROM for normal function with self-care, mobility, lifting and ambulation. Modalities:     [] GAME READY (VASO)- for significant edema, swelling, pain control.      Charges:  Timed Code Treatment Minutes: 40'    Total Treatment Minutes: 72'       [x] EVAL (LOW) 44765 (typically 20 minutes face-to-face)  [] EVAL (MOD) 52932 (typically 30 minutes face-to-face)  [] EVAL (HIGH) 31816 (typically 45 minutes face-to-face)  [] RE-EVAL     [x] CB(56029) x   1  [] IONTO  [] NMR (99471) x     [] VASO  [x] Manual (89843) x    1 [] Other:  [] TA x      [] Mech Traction (56725)  [] ES(attended) (05014)      [x] ES (un) (65496):         ASSESSMENT:  See eval      GOALS:   Patient stated goal:     [] Progressing: [] Met: [] Not Met: [] Adjusted    Therapist goals for Patient:   Short Term Goals: To be achieved in: 2 weeks  1. Independent in HEP and progression per patient tolerance, in order to prevent re-injury. [] Progressing: [] Met: [] Not Met: [] Adjusted   2. Patient will have a decrease in pain to facilitate improvement in movement, function, and ADLs as indicated by Functional Deficits. [] Progressing: [] Met: [] Not Met: [] Adjusted    Long Term Goals: To be achieved in:   12 weeks  - The patient is expected to demonstrate less than 25% impairment, limitation or restriction in:  - changing and maintaining body position     - Patient will demonstrate an increase in Strength to 4+/5 hips to allow for proper functional mobility as indicated by patients Functional Deficits. [] Progressing: [] Met: [] Not Met: [] Adjusted    - Patient will return to previous functional activities without increased symptoms or restriction. [] Progressing: [] Met: [] Not Met: [] Adjusted    - lumbar rom within 15% of global normal    [] Progressing: [] Met: [] Not Met: [] Adjusted    - mild point tenderness @ right lumbar   [] Progressing: [] Met: [] Not Met: [] Adjusted    Overall Progression Towards Functional goals/ Treatment Progress Update:  [] Patient is progressing as expected towards functional goals listed. [] Progression is slowed due to complexities/Impairments listed. [] Progression has been slowed due to co-morbidities.   [x] Plan just implemented, too soon to assess goals

## 2021-04-21 NOTE — PLAN OF CARE
Paresthesia complaints:      - Paresthesia complaints in a distribution. Functional Limitations/Impairments:   - Standing   - Walking      - Squatting/bending    - Stairs             - ADL's   - Sports/Recreation    Occupation/School:   - retired      Sport/recreational activities:     - resistive training   - cardiovascular training     Living Status/Prior Level of Function: This patient was independent in ADL's and IADL's prior to onset of symptoms. PACEMAKER:  - Denied having a pacemaker that would contraindicate the use of electrical modalities. METAL IMPLANTS:  - Denied metal implants that would contraindicate the use of thermal modalities. CANCER HISTORY:  - Denied a history of cancer that would contraindicate thermal modalities. OBJECTIVE:   ROM: decreased lumbar 65%       Strength/Neuromuscular control: 4-/5 B hip strength     Dermatomal Sensation:  - Dermatomal sensation was intact to light touch bilaterally throughout upper and lower extremities. Deep tendon reflexes:  - DTR's were symmetrical and intact throughout. Joint mobility: moderately decreased lumbar     Flexibility:    Palpation: moderate lumbar     Functional Mobility/Transfers:     Posture:     Bandages/Dressings/Incisions:     Gait:     Special Tests/Functional tests:      Orthopedic Special Tests:                        [x] Patient history, allergies, meds reviewed. Medical chart reviewed. See intake form. Review Of Systems (ROS):  [x]Performed Review of systems (Integumentary, CardioPulmonary, Neurological) by intake and observation. Intake form has been scanned into medical record. Patient has been instructed to contact their primary care physician regarding ROS issues if not already being addressed at this time. Cognitive, Communication, Behavior and Learning:  - The patient was alert, spoke coherently and was oriented to person, place and time.   - Demonstrated no barriers to communication or processing reflexes/sensation/myotomal/dermatomal deficits  [x]Reduced balance/proprioceptive control    []other:      Functional Activity Limitations (from functional questionnaire and intake)   [x]Reduced ability to tolerate prolonged functional positions   [x]Reduced ability or difficulty with changes of positions or transfers between positions   [x]Reduced ability to maintain good posture and demonstrate good body mechanics with sitting, bending, and lifting   [x]Reduced ability to sleep   [x] Reduced ability or tolerance with driving and/or computer work   [x]Reduced ability to perform lifting, reaching, carrying tasks   [x]Reduced ability to squat   [x]Reduced ability to forward bend   [x]Reduced ability to ambulate prolonged functional periods/distances/surfaces   [x]Reduced ability to ascend/descend stairs    Participation Restrictions   [x]Reduced participation in self care activities   [x]Reduced participation in home management activities   [x]Reduced participation in work activities   [x]Reduced participation in social activities. [x]Reduced participation in sport activities. The patient demonstrated at least 43% but less than 45% impairment, limitation or restriction in:      - changing and maintaining body position  . Classification :  -Impaired joint mobility, motor function, muscle performance and range of motion associated with a spinal disorder.  - Signs/symptoms consistent with Lumbar instability/stabilization subgroup.     - Signs/symptoms consistent with Lumbar mobilization/manipulation subgroup, myotomes and dermatomes intact. Meets manipulation criteria.        Prognosis/Rehab Potential:       - Good     Factors affecting rehab potential:  - associated co-morbidities    Tolerance of evaluation/treatment:     - Good     Physical Therapy Evaluation Complexity Justification  [] A history of present problem with:  [] no personal factors and/or comorbidities that impact the plan of care;  []1-2 personal factors and/or comorbidities that impact the plan of care  []3 personal factors and/or comorbidities that impact the plan of care  [] An examination of body systems using standardized tests and measures addressing any of the following: body structures and functions (impairments), activity limitations, and/or participation restrictions;:  [] a total of 1-2 or more elements   [] a total of 3 or more elements   [] a total of 4 or more elements   [x] A clinical presentation with:  [] stable and/or uncomplicated characteristics   [x] evolving clinical presentation with changing characteristics  [] unstable and unpredictable characteristics;   [x] Clinical decision making of [x] low, [] moderate, [] high complexity using standardized patient assessment instrument and/or measurable assessment of functional outcome.     [x] EVAL (LOW) 13314 (typically 30 minutes face-to-face)  [] EVAL (MOD) 80568 (typically 30 minutes face-to-face)  [] EVAL (HIGH) 24945 (typically 45 minutes face-to-face)  [] RE-EVAL         Co-morbidities/Complexities (which will affect course of rehabilitation):   []None           Arthritic conditions   []Rheumatoid arthritis (M05.9)  []Osteoarthritis (M19.91)   Cardiovascular conditions   []Hypertension (I10)  []Hyperlipidemia (E78.5)  []Angina pectoris (I20)  []Atherosclerosis (I70)   Musculoskeletal conditions   []Disc pathology   []Congenital spine pathologies   []Prior surgical intervention  []Osteoporosis (M81.8)  []Osteopenia (M85.8)   Endocrine conditions   []Hypothyroid (E03.9)  []Hyperthyroid Gastrointestinal conditions   []Constipation (O48.27)   Metabolic conditions   []Morbid obesity (E66.01)  []Diabetes type 1(E10.65) or 2 (E11.65)   []Neuropathy (G60.9)     Pulmonary conditions   []Asthma (J45)  []Coughing   []COPD (J44.9)   Psychological Disorders  []Anxiety (F41.9)  []Depression (F32.9)   []Other:   [x]Other:     -traumatic below knee amputation          PLAN: Begin PT focusing on: proximal hip mobilization, Lumbar mobilization, trunk motor control, proximal hip motor control, and HEP    Frequency/Duration:  2 days per week for 8 Weeks:  Interventions:  1. Therapeutic exercise including: strength training, ROM/flexibility, NMR and proprioception for the proximal hip, trunk and lower extremity. 2. Manual therapy as indicated including Dry Needling/IASTM, STM, PROM, Gr I-IV mobilizations, spinal mobilization/manipulation. 3. Modalities as needed including: thermal agents, E-stim, US, iontophoresis as indicated. 4. Patient education on spine protection, activity modification, progression of HEP. HEP instruction: supine ktc, supine piriformis, supine pelvic tilts     GOALS:  Patient stated goal:     Therapist goals for Patient:   Short Term Goals: To be achieved in: 2 weeks  - Independent in HEP and progression per patient tolerance, in order to prevent re-injury. - Patient will have a decrease in pain to facilitate improvement in movement, function, and ADLs as indicated by Functional Deficits. Long Term Goals: To be achieved in: 12 weeks  - The patient is expected to demonstrate less than 25% impairment, limitation or restriction in:  - changing and maintaining body position     - Patient will demonstrate an increase in Strength to 4+/5 hips to allow for proper functional mobility as indicated by patients Functional Deficits. - Patient will return to previous functional activities without increased symptoms or restriction.      - wnl lumbar rom   - mild point tenderness @ right lumbar     Electronically signed by:  Ana Ram, PT, MPT

## 2021-04-22 ENCOUNTER — HOSPITAL ENCOUNTER (OUTPATIENT)
Dept: PHYSICAL THERAPY | Age: 66
Setting detail: THERAPIES SERIES
Discharge: HOME | End: 2021-04-22
Payer: MEDICARE

## 2021-04-22 PROCEDURE — G0283 ELEC STIM OTHER THAN WOUND: HCPCS | Performed by: SPECIALIST/TECHNOLOGIST

## 2021-04-22 PROCEDURE — 97140 MANUAL THERAPY 1/> REGIONS: CPT | Performed by: SPECIALIST/TECHNOLOGIST

## 2021-04-22 PROCEDURE — 97110 THERAPEUTIC EXERCISES: CPT | Performed by: SPECIALIST/TECHNOLOGIST

## 2021-04-22 NOTE — FLOWSHEET NOTE
[facility] Orthopaedics and Sports Rehabilitation; [place]         Physical Therapy Treatment Note/ Progress Report:           Date:  2021  Patient Name:  Giuliana Harris    :  1955  MRN: [id number]  Restrictions/Precautions:    Medical/Treatment Diagnosis Information:  ·   Diagnosis: lbp - m54.5  Treatment Diagnosis: lbp - E39.8     Insurance/Certification information:     Physician Information:    Dr. Zayra Sapp  Has the plan of care been signed (Y/N):        []  Yes  [x]  No     Date of Patient follow up with Physician:       Is this a Progress Report:     []  Yes  [x]  No        If Yes:  Date Range for reporting period:  Beginning 21  Ending    Progress report will be due (10 Rx or 30 days whichever is less):  3/51/54      Recertification will be due (POC Duration  / 90 days whichever is less): 21        Visit # Insurance Allowable Auth Required   2  []  Yes []  No        Functional Scale:    Date assessed:        Latex Allergy:  [x]NO      []YES  Preferred Language for Healthcare:   [x]English       []other:      Pain level:  7/10     SUBJECTIVE:  Pt. Reports his LBP continues to be high and his pain levels will increase at night.     OBJECTIVE:    Observation:    Test measurements:      RESTRICTIONS/PRECAUTIONS:     Exercises/Interventions:       Therapeutic Ex (57120) Sets/sec Reps Notes/CUES   ktc   10 x 10\"    LTR  15 x 5\"    Supine piriformis   4 x 30\" B     Supine abd brace   20 x 10\"                                                     Manual Intervention (83753)      STM - bilateral paraspinals, left glut in SL position, PA mobs  20'                                                                                                                                             Therapeutic Exercise and NMR EXR  [x] (96080) Provided verbal/tactile cueing for activities related to strengthening, flexibility, endurance, ROM for improvements in LE, proximal hip, and core control with self care, mobility, lifting, ambulation. [x] (32307) Provided verbal/tactile cueing for activities related to improving balance, coordination, kinesthetic sense, posture, motor skill, proprioception to assist with LE, proximal hip, and core control in self-care, mobility, lifting, ambulation and eccentric single leg control. NMR and Therapeutic Activities:    [x] (62523 or 03389) Provided verbal/tactile cueing for activities related to improving balance, coordination, kinesthetic sense, posture, motor skill, proprioception and motor activation to allow for proper function of core, proximal hip and LE with self-care and ADLs and functional mobility.   [] (41003) Gait Re-education- Provided training and instruction to the patient for proper LE, core and proximal hip recruitment and positioning and eccentric body weight control with ambulation re-education including up and down stairs     Home Exercise Program:    [x] (99650) Reviewed/Progressed HEP activities related to strengthening, flexibility, endurance, ROM of core, proximal hip and LE for functional self-care, mobility, lifting and ambulation/stair navigation   [] (69015) Reviewed/Progressed HEP activities related to improving balance, coordination, kinesthetic sense, posture, motor skill, proprioception of core, proximal hip and LE for self-care, mobility, lifting, and ambulation/stair navigation      Manual Treatments:  PROM / STM / Oscillations-Mobs:  G-I, II, III, IV (PA's, Inf., Post.)  [x] (79583) Provided manual therapy to mobilize LE, proximal hip and/or LS spine soft tissue/joints for the purpose of modulating pain, promoting relaxation, increasing ROM, reducing/eliminating soft tissue swelling/inflammation/restriction, improving soft tissue extensibility and allowing for proper ROM for normal function with self-care, mobility, lifting and ambulation.      Modalities:  EGS + MHP 15' - sitting in a chair   [] GAME READY (VASO)- for significant edema, swelling, pain control. Charges:  Timed Code Treatment Minutes: 35'    Total Treatment Minutes: 28'       [] EVAL (LOW) 47308 (typically 20 minutes face-to-face)  [] EVAL (MOD) 50929 (typically 30 minutes face-to-face)  [] EVAL (HIGH) 64999 (typically 45 minutes face-to-face)  [] RE-EVAL     [x] YT(95466) x   1  [] IONTO  [] NMR (23218) x     [] VASO  [x] Manual (27908) x    1 [] Other:  [] TA x      [] Mech Traction (02366)  [] ES(attended) (62336)      [x] ES (un) (50342):         ASSESSMENT:  Pt. Required cues with exercises and HEP for proper techniques. GOALS:   Patient stated goal:     [] Progressing: [] Met: [] Not Met: [] Adjusted    Therapist goals for Patient:   Short Term Goals: To be achieved in: 2 weeks  1. Independent in HEP and progression per patient tolerance, in order to prevent re-injury. [] Progressing: [] Met: [] Not Met: [] Adjusted   2. Patient will have a decrease in pain to facilitate improvement in movement, function, and ADLs as indicated by Functional Deficits. [] Progressing: [] Met: [] Not Met: [] Adjusted    Long Term Goals: To be achieved in:   12 weeks  - The patient is expected to demonstrate less than 25% impairment, limitation or restriction in:  - changing and maintaining body position     - Patient will demonstrate an increase in Strength to 4+/5 hips to allow for proper functional mobility as indicated by patients Functional Deficits. [] Progressing: [] Met: [] Not Met: [] Adjusted    - Patient will return to previous functional activities without increased symptoms or restriction.         [] Progressing: [] Met: [] Not Met: [] Adjusted    - lumbar rom within 15% of global normal    [] Progressing: [] Met: [] Not Met: [] Adjusted    - mild point tenderness @ right lumbar   [] Progressing: [] Met: [] Not Met: [] Adjusted    Overall Progression Towards Functional goals/ Treatment Progress Update:  [] Patient is progressing as expected towards functional goals listed. [] Progression is slowed due to complexities/Impairments listed. [] Progression has been slowed due to co-morbidities. [x] Plan just implemented, too soon to assess goals progression <30days   [] Goals require adjustment due to lack of progress  [] Patient is not progressing as expected and requires additional follow up with physician  [] Other    Prognosis for POC: [x] Good [] Fair  [] Poor      Patient requires continued skilled intervention: [x] Yes  [] No    Treatment/Activity Tolerance:  [x] Patient able to complete treatment  [] Patient limited by fatigue  [] Patient limited by pain    [] Patient limited by other medical complications  [] Other:         Return to Play: (if applicable)   []  Stage 1: Intro to Strength   []  Stage 2: Return to Run and Strength   []  Stage 3: Return to Jump and Strength   []  Stage 4: Dynamic Strength and Agility   []  Stage 5: Sport Specific Training     []  Ready to Return to Play, Meets All Above Stages   []  Not Ready for Return to Sports   Comments:                               PLAN: See eval  [x] Continue per plan of care [] Alter current plan (see comments above)  [] Plan of care initiated [] Hold pending MD visit [] Discharge      Electronically signed by:  Radha Rouse, PTA  [id number],  Geetha Edgar, PT, MPT     Note: If patient does not return for scheduled/ recommended follow up visits, this note will serve as a discharge from care along with most recent update on progress.

## 2021-04-28 ENCOUNTER — HOSPITAL ENCOUNTER (OUTPATIENT)
Dept: PHYSICAL THERAPY | Age: 66
Setting detail: THERAPIES SERIES
Discharge: HOME | End: 2021-04-28
Payer: MEDICARE

## 2021-04-28 PROCEDURE — 97140 MANUAL THERAPY 1/> REGIONS: CPT | Performed by: SPECIALIST/TECHNOLOGIST

## 2021-04-28 PROCEDURE — G0283 ELEC STIM OTHER THAN WOUND: HCPCS | Performed by: SPECIALIST/TECHNOLOGIST

## 2021-04-28 PROCEDURE — 97110 THERAPEUTIC EXERCISES: CPT | Performed by: SPECIALIST/TECHNOLOGIST

## 2021-04-28 NOTE — FLOWSHEET NOTE
[facility] Orthopaedics and Sports Rehabilitation; [place]         Physical Therapy Treatment Note/ Progress Report:           Date:  2021  Patient Name:  Usman June    :  1955  MRN: [id number]  Restrictions/Precautions:    Medical/Treatment Diagnosis Information:  ·   Diagnosis: lbp - m54.5  Treatment Diagnosis: lbp - E56.5     Insurance/Certification information:     Physician Information:    Dr. Lisandro Rodriguez  Has the plan of care been signed (Y/N):        []  Yes  [x]  No     Date of Patient follow up with Physician:       Is this a Progress Report:     []  Yes  [x]  No        If Yes:  Date Range for reporting period:  Beginning 21  Ending    Progress report will be due (10 Rx or 30 days whichever is less):        Recertification will be due (POC Duration  / 90 days whichever is less): 21        Visit # Insurance Allowable Auth Required   3  []  Yes []  No        Functional Scale:    Date assessed:        Latex Allergy:  [x]NO      []YES  Preferred Language for Healthcare:   [x]English       []other:      Pain level:  8/10     SUBJECTIVE:  Pt. Reports minimal changes in pain levels with the back. Pt. Reports his LBP continues to be high and his pain levels will increase at night.       OBJECTIVE:    Observation:    Test measurements:      RESTRICTIONS/PRECAUTIONS:     Exercises/Interventions:       Therapeutic Ex (47706) Sets/sec Reps Notes/CUES   ktc   10 x 10\"    LTR  15 x 5\"    Supine piriformis   2 x 30\" B     Supine HS stretch 90/90  2 x 15\" LLE C/o pain   Supine abd brace   20 x 10\"  C/o pain                                                   Manual Intervention (32613)      STM - bilateral paraspinals, right glut in SL position, PA mobs, cupping - right paraspinal, right piriformis  25' Therapeutic Exercise and NMR EXR  [x] (49692) Provided verbal/tactile cueing for activities related to strengthening, flexibility, endurance, ROM for improvements in LE, proximal hip, and core control with self care, mobility, lifting, ambulation. [x] (95362) Provided verbal/tactile cueing for activities related to improving balance, coordination, kinesthetic sense, posture, motor skill, proprioception to assist with LE, proximal hip, and core control in self-care, mobility, lifting, ambulation and eccentric single leg control.      NMR and Therapeutic Activities:    [x] (78455 or 91873) Provided verbal/tactile cueing for activities related to improving balance, coordination, kinesthetic sense, posture, motor skill, proprioception and motor activation to allow for proper function of core, proximal hip and LE with self-care and ADLs and functional mobility.   [] (23355) Gait Re-education- Provided training and instruction to the patient for proper LE, core and proximal hip recruitment and positioning and eccentric body weight control with ambulation re-education including up and down stairs     Home Exercise Program:    [x] (91336) Reviewed/Progressed HEP activities related to strengthening, flexibility, endurance, ROM of core, proximal hip and LE for functional self-care, mobility, lifting and ambulation/stair navigation   [] (66360) Reviewed/Progressed HEP activities related to improving balance, coordination, kinesthetic sense, posture, motor skill, proprioception of core, proximal hip and LE for self-care, mobility, lifting, and ambulation/stair navigation      Manual Treatments:  PROM / STM / Oscillations-Mobs:  G-I, II, III, IV (PA's, Inf., Post.)  [x] (23143) Provided manual therapy to mobilize LE, proximal hip and/or LS spine soft tissue/joints for the purpose of modulating pain, promoting relaxation, increasing ROM, reducing/eliminating soft tissue swelling/inflammation/restriction, improving soft tissue extensibility and allowing for proper ROM for normal function with self-care, mobility, lifting and ambulation. Modalities:  EGS + MHP 15' - sitting in a chair   [] GAME READY (VASO)- for significant edema, swelling, pain control. Charges:  Timed Code Treatment Minutes: 39'    Total Treatment Minutes: 39'       [] EVAL (LOW) 35683 (typically 20 minutes face-to-face)  [] EVAL (MOD) 62340 (typically 30 minutes face-to-face)  [] EVAL (HIGH) 83498 (typically 45 minutes face-to-face)  [] RE-EVAL     [x] JK(55325) x   1  [] IONTO  [] NMR (98813) x     [] VASO  [x] Manual (80694) x    2 [] Other:  [] TA x      [] Mech Traction (52651)  [] ES(attended) (94828)      [x] ES (un) (51677):         ASSESSMENT:  Pt. Required cues with exercises and HEP for proper techniques. Pt. C/o increase LBP with any type of exercises. GOALS:   Patient stated goal:     [] Progressing: [] Met: [] Not Met: [] Adjusted    Therapist goals for Patient:   Short Term Goals: To be achieved in: 2 weeks  1. Independent in HEP and progression per patient tolerance, in order to prevent re-injury. [] Progressing: [] Met: [] Not Met: [] Adjusted   2. Patient will have a decrease in pain to facilitate improvement in movement, function, and ADLs as indicated by Functional Deficits. [] Progressing: [] Met: [] Not Met: [] Adjusted    Long Term Goals: To be achieved in:   12 weeks  - The patient is expected to demonstrate less than 25% impairment, limitation or restriction in:  - changing and maintaining body position     - Patient will demonstrate an increase in Strength to 4+/5 hips to allow for proper functional mobility as indicated by patients Functional Deficits. [] Progressing: [] Met: [] Not Met: [] Adjusted    - Patient will return to previous functional activities without increased symptoms or restriction.         [] Progressing: [] Met: [] Not Met: [] Adjusted    - lumbar rom within 15% of global normal    []

## 2021-04-30 ENCOUNTER — HOSPITAL ENCOUNTER (OUTPATIENT)
Dept: PHYSICAL THERAPY | Age: 66
Setting detail: THERAPIES SERIES
Discharge: HOME | End: 2021-04-30
Payer: MEDICARE

## 2021-04-30 PROCEDURE — G0283 ELEC STIM OTHER THAN WOUND: HCPCS | Performed by: SPECIALIST/TECHNOLOGIST

## 2021-04-30 PROCEDURE — 97140 MANUAL THERAPY 1/> REGIONS: CPT | Performed by: SPECIALIST/TECHNOLOGIST

## 2021-04-30 PROCEDURE — 97110 THERAPEUTIC EXERCISES: CPT | Performed by: SPECIALIST/TECHNOLOGIST

## 2021-04-30 NOTE — FLOWSHEET NOTE
[facility] Orthopaedics and Sports Rehabilitation; [employer]         Physical Therapy Treatment Note/ Progress Report:           Date:  2021  Patient Name:  Praveen Haro    :  1955  MRN: [id number]  Restrictions/Precautions:    Medical/Treatment Diagnosis Information:  ·   Diagnosis: lbp - m54.5  Treatment Diagnosis: lbp - W95.5     Insurance/Certification information:     Physician Information:    Dr. Amber Bowman  Has the plan of care been signed (Y/N):        []  Yes  [x]  No     Date of Patient follow up with Physician:       Is this a Progress Report:     []  Yes  [x]  No        If Yes:  Date Range for reporting period:  Beginning 21  Ending    Progress report will be due (10 Rx or 30 days whichever is less):  3/56/50      Recertification will be due (POC Duration  / 90 days whichever is less): 21        Visit # Insurance Allowable Auth Required   4  []  Yes []  No        Functional Scale:    Date assessed:        Latex Allergy:  [x]NO      []YES  Preferred Language for Healthcare:   [x]English       []other:      Pain level:  7/10     SUBJECTIVE:  Pt. Reports his back feels a little better. Pt. Reports sleeping has slightly improve in the last 2 nights.        OBJECTIVE:    Observation:    Test measurements:      RESTRICTIONS/PRECAUTIONS:     Exercises/Interventions:       Therapeutic Ex (63154) Sets/sec Reps Notes/CUES   ktc   10 x 10\" B    LTR with SB  15 x 5\"    Supine piriformis   2 x 30\" B     Supine HS stretch 90/90  2 x 15\" LLE - NV C/o pain   Supine abd brace   20 x 5\"     Prone glut sets  20 x 5                                              Manual Intervention (31932)      STM - bilateral paraspinals, right glut in SL position, PA mobs, cupping - right paraspinal, right piriformis  25'                                                                                                                                             Therapeutic Exercise and NMR EXR  [x] (25130) Provided verbal/tactile cueing for activities related to strengthening, flexibility, endurance, ROM for improvements in LE, proximal hip, and core control with self care, mobility, lifting, ambulation. [x] (11993) Provided verbal/tactile cueing for activities related to improving balance, coordination, kinesthetic sense, posture, motor skill, proprioception to assist with LE, proximal hip, and core control in self-care, mobility, lifting, ambulation and eccentric single leg control.      NMR and Therapeutic Activities:    [x] (57562 or 68315) Provided verbal/tactile cueing for activities related to improving balance, coordination, kinesthetic sense, posture, motor skill, proprioception and motor activation to allow for proper function of core, proximal hip and LE with self-care and ADLs and functional mobility.   [] (60133) Gait Re-education- Provided training and instruction to the patient for proper LE, core and proximal hip recruitment and positioning and eccentric body weight control with ambulation re-education including up and down stairs     Home Exercise Program:    [x] (46510) Reviewed/Progressed HEP activities related to strengthening, flexibility, endurance, ROM of core, proximal hip and LE for functional self-care, mobility, lifting and ambulation/stair navigation   [] (47645) Reviewed/Progressed HEP activities related to improving balance, coordination, kinesthetic sense, posture, motor skill, proprioception of core, proximal hip and LE for self-care, mobility, lifting, and ambulation/stair navigation      Manual Treatments:  PROM / STM / Oscillations-Mobs:  G-I, II, III, IV (PA's, Inf., Post.)  [x] (90766) Provided manual therapy to mobilize LE, proximal hip and/or LS spine soft tissue/joints for the purpose of modulating pain, promoting relaxation, increasing ROM, reducing/eliminating soft tissue swelling/inflammation/restriction, improving soft tissue extensibility and allowing for proper ROM for normal function with self-care, mobility, lifting and ambulation. Modalities:  EGS + MHP 15' - prone    [] GAME READY (VASO)- for significant edema, swelling, pain control. Charges:  Timed Code Treatment Minutes: 45'    Total Treatment Minutes: 39'       [] EVAL (LOW) 54554 (typically 20 minutes face-to-face)  [] EVAL (MOD) 39674 (typically 30 minutes face-to-face)  [] EVAL (HIGH) 97879 (typically 45 minutes face-to-face)  [] RE-EVAL     [x] PW(46286) x   1  [] IONTO  [] NMR (42423) x     [] VASO  [x] Manual (90237) x    2 [] Other:  [] TA x      [] Mech Traction (70876)  [] ES(attended) (78879)      [x] ES (un) (11355):         ASSESSMENT:  Pt. Tolerated tx better than the previous PT sessions. Pt. Required cues with exercises and HEP for proper techniques. TTP over right paraspinal and right piriformis / glut med area. GOALS:   Patient stated goal:     [] Progressing: [] Met: [] Not Met: [] Adjusted    Therapist goals for Patient:   Short Term Goals: To be achieved in: 2 weeks  1. Independent in HEP and progression per patient tolerance, in order to prevent re-injury. [] Progressing: [] Met: [] Not Met: [] Adjusted   2. Patient will have a decrease in pain to facilitate improvement in movement, function, and ADLs as indicated by Functional Deficits. [] Progressing: [] Met: [] Not Met: [] Adjusted    Long Term Goals: To be achieved in:   12 weeks  - The patient is expected to demonstrate less than 25% impairment, limitation or restriction in:  - changing and maintaining body position     - Patient will demonstrate an increase in Strength to 4+/5 hips to allow for proper functional mobility as indicated by patients Functional Deficits. [] Progressing: [] Met: [] Not Met: [] Adjusted    - Patient will return to previous functional activities without increased symptoms or restriction.         [] Progressing: [] Met: [] Not Met: [] Adjusted    - lumbar rom within 15% of global normal    [] Progressing: [] Met: [] Not Met: [] Adjusted    - mild point tenderness @ right lumbar   [] Progressing: [] Met: [] Not Met: [] Adjusted    Overall Progression Towards Functional goals/ Treatment Progress Update:  [] Patient is progressing as expected towards functional goals listed. [] Progression is slowed due to complexities/Impairments listed. [] Progression has been slowed due to co-morbidities. [x] Plan just implemented, too soon to assess goals progression <30days   [] Goals require adjustment due to lack of progress  [] Patient is not progressing as expected and requires additional follow up with physician  [] Other    Prognosis for POC: [x] Good [] Fair  [] Poor      Patient requires continued skilled intervention: [x] Yes  [] No    Treatment/Activity Tolerance:  [x] Patient able to complete treatment  [] Patient limited by fatigue  [x] Patient limited by pain    [] Patient limited by other medical complications  [] Other:         Return to Play: (if applicable)   []  Stage 1: Intro to Strength   []  Stage 2: Return to Run and Strength   []  Stage 3: Return to Jump and Strength   []  Stage 4: Dynamic Strength and Agility   []  Stage 5: Sport Specific Training     []  Ready to Return to Play, Meets All Above Stages   []  Not Ready for Return to Sports   Comments:                               PLAN: See eval  [x] Continue per plan of care [] Alter current plan (see comments above)  [] Plan of care initiated [] Hold pending MD visit [] Discharge      Electronically signed by:  Sarah Florian, PTA  [id number],  Jabier Sandoval, PT, MPT     Note: If patient does not return for scheduled/ recommended follow up visits, this note will serve as a discharge from care along with most recent update on progress.

## 2021-05-04 ENCOUNTER — OFFICE VISIT (OUTPATIENT)
Dept: PULMONOLOGY | Age: 66
End: 2021-05-04
Payer: MEDICARE

## 2021-05-04 ENCOUNTER — TELEPHONE (OUTPATIENT)
Dept: PULMONOLOGY | Age: 66
End: 2021-05-04

## 2021-05-04 VITALS
WEIGHT: 152 LBS | DIASTOLIC BLOOD PRESSURE: 62 MMHG | SYSTOLIC BLOOD PRESSURE: 118 MMHG | HEIGHT: 69 IN | RESPIRATION RATE: 16 BRPM | BODY MASS INDEX: 22.51 KG/M2 | HEART RATE: 115 BPM | OXYGEN SATURATION: 95 %

## 2021-05-04 DIAGNOSIS — J84.9 ILD (INTERSTITIAL LUNG DISEASE) (HCC): Primary | ICD-10-CM

## 2021-05-04 DIAGNOSIS — J84.9 ILD (INTERSTITIAL LUNG DISEASE) (HCC): ICD-10-CM

## 2021-05-04 DIAGNOSIS — T78.49XA OTHER ALLERGY, INITIAL ENCOUNTER: ICD-10-CM

## 2021-05-04 PROCEDURE — 99204 OFFICE O/P NEW MOD 45 MIN: CPT | Performed by: INTERNAL MEDICINE

## 2021-05-04 PROCEDURE — 3017F COLORECTAL CA SCREEN DOC REV: CPT | Performed by: INTERNAL MEDICINE

## 2021-05-04 PROCEDURE — 1036F TOBACCO NON-USER: CPT | Performed by: INTERNAL MEDICINE

## 2021-05-04 PROCEDURE — 1123F ACP DISCUSS/DSCN MKR DOCD: CPT | Performed by: INTERNAL MEDICINE

## 2021-05-04 PROCEDURE — 4040F PNEUMOC VAC/ADMIN/RCVD: CPT | Performed by: INTERNAL MEDICINE

## 2021-05-04 PROCEDURE — G8427 DOCREV CUR MEDS BY ELIG CLIN: HCPCS | Performed by: INTERNAL MEDICINE

## 2021-05-04 PROCEDURE — G8420 CALC BMI NORM PARAMETERS: HCPCS | Performed by: INTERNAL MEDICINE

## 2021-05-04 RX ORDER — OMEPRAZOLE 40 MG/1
40 CAPSULE, DELAYED RELEASE ORAL
Status: ON HOLD | COMMUNITY
Start: 2020-11-06 | End: 2021-07-15 | Stop reason: HOSPADM

## 2021-05-04 RX ORDER — PREDNISONE 10 MG/1
TABLET ORAL
COMMUNITY
Start: 2020-09-11 | End: 2021-05-17

## 2021-05-04 ASSESSMENT — ENCOUNTER SYMPTOMS
WHEEZING: 0
SHORTNESS OF BREATH: 1
COUGH: 1
CHEST TIGHTNESS: 0

## 2021-05-04 NOTE — TELEPHONE ENCOUNTER
Hawk Luis states that you told him to call office if he could not schedule test  before the 13th. He was not able to schedule and would like to you to call him. He can be reached at [phone].   Thanks

## 2021-05-04 NOTE — PROGRESS NOTES
[facility] Pulmonary and Critical Care    Outpatient Note    Subjective:   CHIEF COMPLAINT:   Chief Complaint   Patient presents with    New Patient    Shortness of Breath    Cough     some cough     Other     hand spasims        HPI:     The patient is 77 y.o. male who presents today for a new patient visit for shortness of breath and cough precipitated by minimal exertion or talking. He is known to have pulmonary fibrosis and is here today to establish care with [facility]. His condition started 2-3 years ago after a visit to [facility]. It is progressively getting worse. He was treated with prednisone for over three months. Now he is also complaining of back pain. He is unable to sleep flat as this precipitate cough and mucus production. He is unable to drive due to muscle spasms. This is going on over the past 6-8 months. He was on ciprofloxacin for years for osteomyelitis after an accident. 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 He came to [address] [facility] 15 years ago and since he is working in [place]. Does not recall exposure to chemicals or asbestos. There is no hx of inflammatory arthritis or rash, however he is slowly developing finger clubbing. He was seen by rheumatology and it was felt his ILD is not due to a rheumatologic condition. Never smoker    Past Medical History:    Past Medical History:   Diagnosis Date    Amputation of leg below knee, right, traumatic, complicated, subsequent encounter        Social History:    Social History     Tobacco Use   Smoking Status Never Smoker   Smokeless Tobacco Never Used       Family History:  History reviewed. No pertinent family history.     Current Medications:  Current Outpatient Medications on File Prior to Visit   Medication Sig Dispense Refill    predniSONE (DELTASONE) 10 MG tablet 40 mg x 14 days 30 mg x 14 days 20 mg x 14 days 10 mg after      Cholecalciferol (VITAMIN D3 PO) Take by mouth      omeprazole (PRILOSEC) 40 MG delayed release capsule Take 40 mg by mouth      albuterol sulfate HFA (PROVENTIL HFA) 108 (90 Base) MCG/ACT inhaler Inhale 2 puffs into the lungs every 6 hours as needed for Wheezing With spacer 1 Inhaler 3     No current facility-administered medications on file prior to visit. REVIEW OF SYSTEMS:    Review of Systems   Constitutional: Negative for chills, fatigue and fever. HENT: Negative for congestion. Respiratory: Positive for cough and shortness of breath (on exertion ). Negative for chest tightness and wheezing. Cardiovascular: Negative for chest pain, palpitations and leg swelling. Neurological: Negative for weakness. Psychiatric/Behavioral: The patient is not nervous/anxious. All other systems reviewed and are negative. Objective:   PHYSICAL EXAM:        VITALS:  /62 (Site: Left Upper Arm, Position: Sitting, Cuff Size: Large Adult)   Pulse 115   Resp 16   Ht 5' 9\" (1.753 m)   Wt 152 lb (68.9 kg)   SpO2 95%   BMI 22.45 kg/m²     Physical Exam  Vitals signs reviewed. Constitutional:       Appearance: He is well-developed. HENT:      Head: Normocephalic and atraumatic. Eyes:      Extraocular Movements: Extraocular movements intact. Pupils: Pupils are equal, round, and reactive to light. Neck:      Musculoskeletal: Normal range of motion and neck supple. Vascular: No JVD. Cardiovascular:      Rate and Rhythm: Normal rate and regular rhythm. Heart sounds: No murmur. Pulmonary:      Effort: Pulmonary effort is normal. No respiratory distress. Breath sounds: No stridor. Rales (bibasilar rales) present. No wheezing. Abdominal:      General: Bowel sounds are normal.      Palpations: Abdomen is soft. Musculoskeletal:         General: No deformity. Left lower leg: No edema. Comments: Right leg amputation   Skin:     General: Skin is warm and dry.    Neurological:      Mental Status: He is alert and oriented to person, place, and time. Psychiatric:         Behavior: Behavior normal.         DATA:    CXR on 4/7/21  PORTABLE AP CHEST AT 2206 HOURS:         HISTORY: Shortness of breath.       COMPARISON: None.          FINDINGS: The heart is normal in size. Inspiration is somewhat limited. There is a diffuse accentuation of pulmonary interstitial markings seen throughout both lungs. No focal alveolar infiltrates or effusions are seen.       Bony structures are unremarkable.            Impression       Diffuse accentuation of pulmonary interstitial markings throughout both lungs, which may be either acute or chronic. Interstitial pneumonitis, or possible underlying fibrosis can give this appearance. Limited inspiration.       Correlate clinically. CT chest on 9/8/20 (comparison with CT chest on 12/4/18  CT pattern indeterminate for UIP. Not significantly changed diffuse peripheral fibrosis with traction bronchiectasis, slightly more prominent in the mid and upper lung zones. fe ti would include chronic hypersensitivity pneumonitis, amongst other etiologies. Assessment:      Diagnosis Orders   1. ILD (interstitial lung disease) (formerly [facility])  CT CHEST HIGH RESOLUTION    Full PFT Study With Bronchodilator    Carbon Monoxide Diffusing Capacity    Rheumatoid Factor    Sedimentation Rate    Hypersensitivity Pneumonitis Extended Panel    Anti-Neutrophilic Cytoplasmic Antibody    NICKY Reflex to Antibody [place]    ANTI-KARL 1 ANTIBODY, IGG    C-REACTIVE PROTEIN    COMPREHENSIVE METABOLIC PANEL   2. Other allergy, initial encounter   Hypersensitivity Pneumonitis Extended Panel       Plan:   77year old male with ILD. Prior CT scan is indeterminate for UIP. No definite rheumatologic condition. CRP was low in 2018. Markers for autoimmune diseases are negative. He is developing finger clubbing and has mildly rough hand    He was on cipro for years, however he stopped it in 2000.       Apparently fibrotic disease is more predominant in the mid upper zones. DDx in upper lobes include HP, and sarcoidosis. NSIP is usually diffuse but can not be rules out. Plan   Get repeat high res CT  Get new PFT  Repeat serology, sed rate and CRP  Get new HP panel  Get CMP  If no definite diagnosis will consider open biopsy  He does feel better with prednisone but apparently he is developing side effects. Will consider steroid sparing agent if condition deemed steroid responsive.

## 2021-05-05 ENCOUNTER — HOSPITAL ENCOUNTER (OUTPATIENT)
Dept: PHYSICAL THERAPY | Age: 66
Setting detail: THERAPIES SERIES
Discharge: HOME | End: 2021-05-05
Payer: MEDICARE

## 2021-05-05 LAB
A/G RATIO: 1.3 (ref 1.1–2.2)
ALBUMIN SERPL-MCNC: 3.7 G/DL (ref 3.4–5)
ALP BLD-CCNC: 131 U/L (ref 40–129)
ALT SERPL-CCNC: 34 U/L (ref 10–40)
ANION GAP SERPL CALCULATED.3IONS-SCNC: 9 MMOL/L (ref 3–16)
ANTI-JO1 IGG: <0.2 AI (ref 0–0.9)
ANTI-NUCLEAR ANTIBODY (ANA): NEGATIVE
AST SERPL-CCNC: 27 U/L (ref 15–37)
BILIRUB SERPL-MCNC: 0.6 MG/DL (ref 0–1)
BUN BLDV-MCNC: 9 MG/DL (ref 7–20)
C-REACTIVE PROTEIN: 8.2 MG/L (ref 0–5.1)
CALCIUM SERPL-MCNC: 8.8 MG/DL (ref 8.3–10.6)
CHLORIDE BLD-SCNC: 97 MMOL/L (ref 99–110)
CO2: 28 MMOL/L (ref 21–32)
CREAT SERPL-MCNC: 1 MG/DL (ref 0.8–1.3)
GFR AFRICAN AMERICAN: >60
GFR NON-AFRICAN AMERICAN: >60
GLOBULIN: 2.9 G/DL
GLUCOSE BLD-MCNC: 118 MG/DL (ref 70–99)
POTASSIUM SERPL-SCNC: 4.6 MMOL/L (ref 3.5–5.1)
RHEUMATOID FACTOR: <10 IU/ML
SEDIMENTATION RATE, ERYTHROCYTE: 31 MM/HR (ref 0–20)
SODIUM BLD-SCNC: 134 MMOL/L (ref 136–145)
TOTAL PROTEIN: 6.6 G/DL (ref 6.4–8.2)

## 2021-05-05 PROCEDURE — 97140 MANUAL THERAPY 1/> REGIONS: CPT | Performed by: SPECIALIST/TECHNOLOGIST

## 2021-05-05 PROCEDURE — G0283 ELEC STIM OTHER THAN WOUND: HCPCS | Performed by: SPECIALIST/TECHNOLOGIST

## 2021-05-05 PROCEDURE — 97110 THERAPEUTIC EXERCISES: CPT | Performed by: SPECIALIST/TECHNOLOGIST

## 2021-05-05 NOTE — FLOWSHEET NOTE
[facility] Orthopaedics and Sports Rehabilitation; [place]         Physical Therapy Treatment Note/ Progress Report:           Date:  2021  Patient Name:  Mary Orozco    :  1955  MRN: [id number]  Restrictions/Precautions:    Medical/Treatment Diagnosis Information:  ·   Diagnosis: lbp - m54.5  Treatment Diagnosis: lbp - F91.3     Insurance/Certification information:     Physician Information:    Dr. Jacinta Kate  Has the plan of care been signed (Y/N):        []  Yes  [x]  No     Date of Patient follow up with Physician:       Is this a Progress Report:     []  Yes  [x]  No        If Yes:  Date Range for reporting period:  Beginning 21  Ending    Progress report will be due (10 Rx or 30 days whichever is less):        Recertification will be due (POC Duration  / 90 days whichever is less): 21        Visit # Insurance Allowable Auth Required   4  []  Yes []  No        Functional Scale:    Date assessed:        Latex Allergy:  [x]NO      []YES  Preferred Language for Healthcare:   [x]English       []other:      Pain level:  -8/10     SUBJECTIVE:  Pt. Reports his back feels a little better. Pt. Reports decrease pain at night.       OBJECTIVE:    Observation:    Test measurements:      RESTRICTIONS/PRECAUTIONS:     Exercises/Interventions:       Therapeutic Ex (41493) Sets/sec Reps Notes/CUES   dktc   10 x 10\" B    LTR with SB  15 x 5\"    Supine piriformis   2 x 30\" B     Supine HS stretch 90/90   2x 30\" B     Supine abd brace   20 x 5\"     Prone glut sets  20 x 5\"                                              Manual Intervention (27089)      STM - bilateral paraspinals, right glut in SL position, PA mobs, cupping - right paraspinal, right piriformis  25'                                                                                                                                             Therapeutic Exercise and NMR EXR  [x] (82168) Provided verbal/tactile cueing for activities related to strengthening, flexibility, endurance, ROM for improvements in LE, proximal hip, and core control with self care, mobility, lifting, ambulation. [x] (54763) Provided verbal/tactile cueing for activities related to improving balance, coordination, kinesthetic sense, posture, motor skill, proprioception to assist with LE, proximal hip, and core control in self-care, mobility, lifting, ambulation and eccentric single leg control.      NMR and Therapeutic Activities:    [x] (79128 or 92465) Provided verbal/tactile cueing for activities related to improving balance, coordination, kinesthetic sense, posture, motor skill, proprioception and motor activation to allow for proper function of core, proximal hip and LE with self-care and ADLs and functional mobility.   [] (60228) Gait Re-education- Provided training and instruction to the patient for proper LE, core and proximal hip recruitment and positioning and eccentric body weight control with ambulation re-education including up and down stairs     Home Exercise Program:    [x] (47825) Reviewed/Progressed HEP activities related to strengthening, flexibility, endurance, ROM of core, proximal hip and LE for functional self-care, mobility, lifting and ambulation/stair navigation   [] (55557) Reviewed/Progressed HEP activities related to improving balance, coordination, kinesthetic sense, posture, motor skill, proprioception of core, proximal hip and LE for self-care, mobility, lifting, and ambulation/stair navigation      Manual Treatments:  PROM / STM / Oscillations-Mobs:  G-I, II, III, IV (PA's, Inf., Post.)  [x] (94788) Provided manual therapy to mobilize LE, proximal hip and/or LS spine soft tissue/joints for the purpose of modulating pain, promoting relaxation, increasing ROM, reducing/eliminating soft tissue swelling/inflammation/restriction, improving soft tissue extensibility and allowing for proper ROM for normal function with self-care, mobility, lifting and ambulation. Modalities:  EGS + MHP 15' - sitting    [] GAME READY (VASO)- for significant edema, swelling, pain control. Charges:  Timed Code Treatment Minutes: 45'    Total Treatment Minutes: 39'       [] EVAL (LOW) 83528 (typically 20 minutes face-to-face)  [] EVAL (MOD) 63379 (typically 30 minutes face-to-face)  [] EVAL (HIGH) 27221 (typically 45 minutes face-to-face)  [] RE-EVAL     [x] OD(86423) x   1  [] IONTO  [] NMR (72910) x     [] VASO  [x] Manual (39881) x    2 [] Other:  [] TA x      [] Mech Traction (11021)  [] ES(attended) (98082)      [x] ES (un) (09216):         ASSESSMENT:  Pt. Tolerated tx better than the previous PT sessions. Pt. Required cues with exercises and HEP for proper techniques. TTP over right paraspinal and right piriformis / glut med area. GOALS:   Patient stated goal:     [] Progressing: [] Met: [] Not Met: [] Adjusted    Therapist goals for Patient:   Short Term Goals: To be achieved in: 2 weeks  1. Independent in HEP and progression per patient tolerance, in order to prevent re-injury. [] Progressing: [] Met: [] Not Met: [] Adjusted   2. Patient will have a decrease in pain to facilitate improvement in movement, function, and ADLs as indicated by Functional Deficits. [] Progressing: [] Met: [] Not Met: [] Adjusted    Long Term Goals: To be achieved in:   12 weeks  - The patient is expected to demonstrate less than 25% impairment, limitation or restriction in:  - changing and maintaining body position     - Patient will demonstrate an increase in Strength to 4+/5 hips to allow for proper functional mobility as indicated by patients Functional Deficits. [] Progressing: [] Met: [] Not Met: [] Adjusted    - Patient will return to previous functional activities without increased symptoms or restriction.         [] Progressing: [] Met: [] Not Met: [] Adjusted    - lumbar rom within 15% of global normal    [] Progressing: [] Met: [] Not Met: [] Adjusted    - mild point tenderness @ right lumbar   [] Progressing: [] Met: [] Not Met: [] Adjusted    Overall Progression Towards Functional goals/ Treatment Progress Update:  [] Patient is progressing as expected towards functional goals listed. [] Progression is slowed due to complexities/Impairments listed. [] Progression has been slowed due to co-morbidities. [x] Plan just implemented, too soon to assess goals progression <30days   [] Goals require adjustment due to lack of progress  [] Patient is not progressing as expected and requires additional follow up with physician  [] Other    Prognosis for POC: [x] Good [] Fair  [] Poor      Patient requires continued skilled intervention: [x] Yes  [] No    Treatment/Activity Tolerance:  [x] Patient able to complete treatment  [] Patient limited by fatigue  [x] Patient limited by pain    [] Patient limited by other medical complications  [] Other:         Return to Play: (if applicable)   []  Stage 1: Intro to Strength   []  Stage 2: Return to Run and Strength   []  Stage 3: Return to Jump and Strength   []  Stage 4: Dynamic Strength and Agility   []  Stage 5: Sport Specific Training     []  Ready to Return to Play, Meets All Above Stages   []  Not Ready for Return to Sports   Comments:                               PLAN: See eval  [x] Continue per plan of care [] Alter current plan (see comments above)  [] Plan of care initiated [] Hold pending MD visit [] Discharge      Electronically signed by:  Dominique Lopez, PTA  [id number],  Pavan Acosta, PT, MPT     Note: If patient does not return for scheduled/ recommended follow up visits, this note will serve as a discharge from care along with most recent update on progress.

## 2021-05-05 NOTE — TELEPHONE ENCOUNTER
Called again--wanting to know if Dr. Jane Beckett called to have pt \"squeezed\" in for CT & PFTs    Please all Everardo Tristan [phone]

## 2021-05-07 ENCOUNTER — HOSPITAL ENCOUNTER (OUTPATIENT)
Dept: CT IMAGING | Age: 66
Discharge: HOME | End: 2021-05-07
Payer: MEDICARE

## 2021-05-07 ENCOUNTER — HOSPITAL ENCOUNTER (OUTPATIENT)
Dept: PHYSICAL THERAPY | Age: 66
Setting detail: THERAPIES SERIES
Discharge: HOME | End: 2021-05-07
Payer: MEDICARE

## 2021-05-07 DIAGNOSIS — J84.9 ILD (INTERSTITIAL LUNG DISEASE) (HCC): ICD-10-CM

## 2021-05-07 LAB — ANCA IFA: NORMAL

## 2021-05-07 PROCEDURE — 71250 CT THORAX DX C-: CPT

## 2021-05-07 NOTE — FLOWSHEET NOTE
The [address], [facility]      Physical Therapy  Cancellation/No-show Note  Patient Name:  Hilario Terry  :  1955   Date:  2021  Cancelled visits to date: 0  No-shows to date: 1    For today's appointment patient:  []  Cancelled  []  Rescheduled appointment  [x]  No-show     Reason given by patient:  []  Patient ill  []  Conflicting appointment  []  No transportation    []  Conflict with work  []  No reason given  []  Other:     Comments:      Electronically signed by:  Adalgisa Womack, [facility]

## 2021-05-09 LAB
ALLERGEN BEEF: <0.1 KU/L
ALLERGEN PHOMA BETAE: <0.1 KU/L
ALLERGEN PORK: <0.1 KU/L
ALLERGEN SEE NOTE: NORMAL
ALLERGEN, FEATHER MIX: NEGATIVE KU/L
ASPERGILLUS FLAVUS ANTIBODIES: NORMAL
ASPERGILLUS FUMIGATUS #1: NORMAL
ASPERGILLUS FUMIGATUS #2: NORMAL
ASPERGILLUS FUMIGATUS #3: NORMAL
ASPERGILLUS FUMIGATUS #6: NORMAL
AUREOBASIDIUM PULLULANS: NORMAL
MICROPOLYSPORA FAENI: NORMAL
PIGEON SERUM ABS: NORMAL
SACCHAROMONOSPORA VIRIDIS: NORMAL
THERMOACTINOMYCES CANDIDUS AB: NORMAL
THERMOACTINOMYCES VULGARIS #1: NORMAL

## 2021-05-10 ENCOUNTER — OFFICE VISIT (OUTPATIENT)
Dept: PRIMARY CARE CLINIC | Age: 66
End: 2021-05-10
Payer: MEDICARE

## 2021-05-10 DIAGNOSIS — Z01.818 PRE-OP EXAMINATION: Primary | ICD-10-CM

## 2021-05-10 PROCEDURE — 99211 OFF/OP EST MAY X REQ PHY/QHP: CPT | Performed by: NURSE PRACTITIONER

## 2021-05-11 ENCOUNTER — HOSPITAL ENCOUNTER (OUTPATIENT)
Dept: PULMONOLOGY | Age: 66
Discharge: HOME | End: 2021-05-11
Payer: MEDICARE

## 2021-05-11 VITALS — OXYGEN SATURATION: 96 %

## 2021-05-11 DIAGNOSIS — J84.9 ILD (INTERSTITIAL LUNG DISEASE) (HCC): ICD-10-CM

## 2021-05-11 LAB — SARS-COV-2: NOT DETECTED

## 2021-05-11 PROCEDURE — 94729 DIFFUSING CAPACITY: CPT

## 2021-05-11 PROCEDURE — 94760 N-INVAS EAR/PLS OXIMETRY 1: CPT

## 2021-05-11 PROCEDURE — 6360000002 HC RX W HCPCS: Performed by: INTERNAL MEDICINE

## 2021-05-11 PROCEDURE — 94726 PLETHYSMOGRAPHY LUNG VOLUMES: CPT

## 2021-05-11 PROCEDURE — 94060 EVALUATION OF WHEEZING: CPT

## 2021-05-11 PROCEDURE — 94664 DEMO&/EVAL PT USE INHALER: CPT

## 2021-05-11 RX ORDER — ALBUTEROL SULFATE 2.5 MG/3ML
2.5 SOLUTION RESPIRATORY (INHALATION) ONCE
Status: COMPLETED | OUTPATIENT
Start: 2021-05-11 | End: 2021-05-11

## 2021-05-11 RX ADMIN — ALBUTEROL SULFATE 2.5 MG: 2.5 SOLUTION RESPIRATORY (INHALATION) at 08:22

## 2021-05-12 ENCOUNTER — HOSPITAL ENCOUNTER (OUTPATIENT)
Dept: PHYSICAL THERAPY | Age: 66
Setting detail: THERAPIES SERIES
Discharge: HOME | End: 2021-05-12
Payer: MEDICARE

## 2021-05-12 PROCEDURE — 97110 THERAPEUTIC EXERCISES: CPT | Performed by: SPECIALIST/TECHNOLOGIST

## 2021-05-12 PROCEDURE — 97140 MANUAL THERAPY 1/> REGIONS: CPT | Performed by: SPECIALIST/TECHNOLOGIST

## 2021-05-12 PROCEDURE — G0283 ELEC STIM OTHER THAN WOUND: HCPCS | Performed by: SPECIALIST/TECHNOLOGIST

## 2021-05-12 NOTE — FLOWSHEET NOTE
[facility] Orthopaedics and Sports Rehabilitation; [place]         Physical Therapy Treatment Note/ Progress Report:           Date:  2021  Patient Name:  Alicia Copeland    :  1955  MRN: [id number]  Restrictions/Precautions:    Medical/Treatment Diagnosis Information:  ·   Diagnosis: lbp - m54.5  Treatment Diagnosis: lbp - I76.6     Insurance/Certification information:     Physician Information:    Dr. Shraddha Lane  Has the plan of care been signed (Y/N):        []  Yes  [x]  No     Date of Patient follow up with Physician:       Is this a Progress Report:     []  Yes  [x]  No        If Yes:  Date Range for reporting period:  Beginning 21  Ending    Progress report will be due (10 Rx or 30 days whichever is less):        Recertification will be due (POC Duration  / 90 days whichever is less): 21        Visit # Insurance Allowable Auth Required   5  []  Yes []  No        Functional Scale:    Date assessed:        Latex Allergy:  [x]NO      []YES  Preferred Language for Healthcare:   [x]English       []other:      Pain level:  -7/10     SUBJECTIVE:  Pt. Reports his back feels a little better. Pt. Reports he is able to do more around his house with less LBP.         OBJECTIVE:    Observation:    Test measurements:      RESTRICTIONS/PRECAUTIONS:     Exercises/Interventions:       Therapeutic Ex (73572) Sets/sec Reps Notes/CUES   dktc   10 x 10\" B    LTR with SB  15 x 5\"    Supine piriformis   2 x 30\" B     Supine HS stretch 90/90   2x 30\" B     Supine abd brace   20 x 5\"     Prone glut sets  20 x 5\"    Prone press up - elbows  10 x 5\"          SLR abd  X 15 B                            Manual Intervention (71326)      STM - bilateral paraspinals, right glut, PA mobs, cupping + massage - right paraspinal, right piriformis, Thera gun  25' Therapeutic Exercise and NMR EXR  [x] (69485) Provided verbal/tactile cueing for activities related to strengthening, flexibility, endurance, ROM for improvements in LE, proximal hip, and core control with self care, mobility, lifting, ambulation. [x] (32461) Provided verbal/tactile cueing for activities related to improving balance, coordination, kinesthetic sense, posture, motor skill, proprioception to assist with LE, proximal hip, and core control in self-care, mobility, lifting, ambulation and eccentric single leg control.      NMR and Therapeutic Activities:    [x] (12781 or 64955) Provided verbal/tactile cueing for activities related to improving balance, coordination, kinesthetic sense, posture, motor skill, proprioception and motor activation to allow for proper function of core, proximal hip and LE with self-care and ADLs and functional mobility.   [] (72252) Gait Re-education- Provided training and instruction to the patient for proper LE, core and proximal hip recruitment and positioning and eccentric body weight control with ambulation re-education including up and down stairs     Home Exercise Program:    [x] (14019) Reviewed/Progressed HEP activities related to strengthening, flexibility, endurance, ROM of core, proximal hip and LE for functional self-care, mobility, lifting and ambulation/stair navigation   [] (62151) Reviewed/Progressed HEP activities related to improving balance, coordination, kinesthetic sense, posture, motor skill, proprioception of core, proximal hip and LE for self-care, mobility, lifting, and ambulation/stair navigation      Manual Treatments:  PROM / STM / Oscillations-Mobs:  G-I, II, III, IV (PA's, Inf., Post.)  [x] (71045) Provided manual therapy to mobilize LE, proximal hip and/or LS spine soft tissue/joints for the purpose of modulating pain, promoting relaxation, increasing ROM, reducing/eliminating soft tissue swelling/inflammation/restriction, improving soft tissue extensibility and allowing for proper ROM for normal function with self-care, mobility, lifting and ambulation. Modalities:  EGS + MHP 15' - sitting    [] GAME READY (VASO)- for significant edema, swelling, pain control. Charges:  Timed Code Treatment Minutes: 45'    Total Treatment Minutes: 39'       [] EVAL (LOW) 94662 (typically 20 minutes face-to-face)  [] EVAL (MOD) 43047 (typically 30 minutes face-to-face)  [] EVAL (HIGH) 78911 (typically 45 minutes face-to-face)  [] RE-EVAL     [x] OX(67940) x   1  [] IONTO  [] NMR (07865) x     [] VASO  [x] Manual (32961) x    2 [] Other:  [] TA x      [] Mech Traction (06045)  [] ES(attended) (00350)      [x] ES (un) (70062):         ASSESSMENT:  Pt. Tolerated tx better than the previous PT sessions. Pt. Required cues with exercises and HEP for proper techniques. TTP over right paraspinal and right piriformis / glut med area. GOALS:   Patient stated goal:     [] Progressing: [] Met: [] Not Met: [] Adjusted    Therapist goals for Patient:   Short Term Goals: To be achieved in: 2 weeks  1. Independent in HEP and progression per patient tolerance, in order to prevent re-injury. [] Progressing: [] Met: [] Not Met: [] Adjusted   2. Patient will have a decrease in pain to facilitate improvement in movement, function, and ADLs as indicated by Functional Deficits. [] Progressing: [] Met: [] Not Met: [] Adjusted    Long Term Goals: To be achieved in:   12 weeks  - The patient is expected to demonstrate less than 25% impairment, limitation or restriction in:  - changing and maintaining body position     - Patient will demonstrate an increase in Strength to 4+/5 hips to allow for proper functional mobility as indicated by patients Functional Deficits. [] Progressing: [] Met: [] Not Met: [] Adjusted    - Patient will return to previous functional activities without increased symptoms or restriction. [] Progressing: [] Met: [] Not Met: [] Adjusted    - lumbar rom within 15% of global normal    [] Progressing: [] Met: [] Not Met: [] Adjusted    - mild point tenderness @ right lumbar   [] Progressing: [] Met: [] Not Met: [] Adjusted    Overall Progression Towards Functional goals/ Treatment Progress Update:  [] Patient is progressing as expected towards functional goals listed. [] Progression is slowed due to complexities/Impairments listed. [] Progression has been slowed due to co-morbidities. [x] Plan just implemented, too soon to assess goals progression <30days   [] Goals require adjustment due to lack of progress  [] Patient is not progressing as expected and requires additional follow up with physician  [] Other    Prognosis for POC: [x] Good [] Fair  [] Poor      Patient requires continued skilled intervention: [x] Yes  [] No    Treatment/Activity Tolerance:  [x] Patient able to complete treatment  [] Patient limited by fatigue  [x] Patient limited by pain    [] Patient limited by other medical complications  [] Other:         Return to Play: (if applicable)   []  Stage 1: Intro to Strength   []  Stage 2: Return to Run and Strength   []  Stage 3: Return to Jump and Strength   []  Stage 4: Dynamic Strength and Agility   []  Stage 5: Sport Specific Training     []  Ready to Return to Play, Meets All Above Stages   []  Not Ready for Return to Sports   Comments:                               PLAN: See eval  [x] Continue per plan of care [] Alter current plan (see comments above)  [] Plan of care initiated [] Hold pending MD visit [] Discharge      Electronically signed by:  Zack Bernard, PTA  [id number],  Mignon Metzger PT, MPT     Note: If patient does not return for scheduled/ recommended follow up visits, this note will serve as a discharge from care along with most recent update on progress.

## 2021-05-13 ENCOUNTER — OFFICE VISIT (OUTPATIENT)
Dept: PULMONOLOGY | Age: 66
End: 2021-05-13
Payer: MEDICARE

## 2021-05-13 ENCOUNTER — TELEPHONE (OUTPATIENT)
Dept: PULMONOLOGY | Age: 66
End: 2021-05-13

## 2021-05-13 ENCOUNTER — PROCEDURE VISIT (OUTPATIENT)
Dept: PULMONOLOGY | Age: 66
End: 2021-05-13

## 2021-05-13 VITALS
BODY MASS INDEX: 22.43 KG/M2 | HEART RATE: 113 BPM | DIASTOLIC BLOOD PRESSURE: 82 MMHG | WEIGHT: 148 LBS | SYSTOLIC BLOOD PRESSURE: 136 MMHG | OXYGEN SATURATION: 95 % | HEIGHT: 68 IN | TEMPERATURE: 97.1 F

## 2021-05-13 DIAGNOSIS — J84.112 IPF (IDIOPATHIC PULMONARY FIBROSIS) (HCC): Primary | ICD-10-CM

## 2021-05-13 DIAGNOSIS — J96.11 CHRONIC RESPIRATORY FAILURE WITH HYPOXIA (HCC): ICD-10-CM

## 2021-05-13 DIAGNOSIS — R91.8 LUNG MASS: ICD-10-CM

## 2021-05-13 PROBLEM — R05.3 CHRONIC COUGH: Status: RESOLVED | Noted: 2018-12-20 | Resolved: 2021-05-13

## 2021-05-13 PROCEDURE — 1036F TOBACCO NON-USER: CPT | Performed by: INTERNAL MEDICINE

## 2021-05-13 PROCEDURE — 3017F COLORECTAL CA SCREEN DOC REV: CPT | Performed by: INTERNAL MEDICINE

## 2021-05-13 PROCEDURE — G8420 CALC BMI NORM PARAMETERS: HCPCS | Performed by: INTERNAL MEDICINE

## 2021-05-13 PROCEDURE — 1123F ACP DISCUSS/DSCN MKR DOCD: CPT | Performed by: INTERNAL MEDICINE

## 2021-05-13 PROCEDURE — 99215 OFFICE O/P EST HI 40 MIN: CPT | Performed by: INTERNAL MEDICINE

## 2021-05-13 PROCEDURE — G8427 DOCREV CUR MEDS BY ELIG CLIN: HCPCS | Performed by: INTERNAL MEDICINE

## 2021-05-13 PROCEDURE — 4040F PNEUMOC VAC/ADMIN/RCVD: CPT | Performed by: INTERNAL MEDICINE

## 2021-05-13 RX ORDER — PIRFENIDONE 267 MG/1
TABLET, COATED ORAL
Qty: 207 TABLET | Refills: 3 | Status: ON HOLD | OUTPATIENT
Start: 2021-05-13 | End: 2021-07-15 | Stop reason: HOSPADM

## 2021-05-13 ASSESSMENT — ENCOUNTER SYMPTOMS
CHEST TIGHTNESS: 0
COUGH: 1
WHEEZING: 0
SHORTNESS OF BREATH: 1

## 2021-05-13 NOTE — PROGRESS NOTES
[facility]    Outpatient Note    Subjective:   CHIEF COMPLAINT:   No chief complaint on file. Interval history on 5/13/21  Continue to have SOLORZANO, with recurrent upper abdominal spasms. Appetite is poor. HPI:  5/4/21   The patient is 77 y.o. male who presents today for a new patient visit for shortness of breath and cough precipitated by minimal exertion or talking. He is known to have pulmonary fibrosis and is here today to establish care with [facility]. His condition started 2-3 years ago after a visit to [facility]. It is progressively getting worse. He was treated with prednisone for over three months. Now he is also complaining of back pain. He is unable to sleep flat as this precipitate cough and mucus production. He is unable to drive due to muscle spasms. This is going on over the past 6-8 months. He was on ciprofloxacin for years for osteomyelitis after an accident. In 2000 he had amputation of RLL and since he is off ABX    He used to work in a [employer] years ago. He came to [facility] 15 years ago and since he is working in [employer]. Does not recall exposure to chemicals or asbestos. There is no hx of inflammatory arthritis or rash, however he is slowly developing finger clubbing. He was seen by rheumatology and it was felt his ILD is not due to a rheumatologic condition. Never smoker    Past Medical History:    Past Medical History:   Diagnosis Date    Amputation of leg below knee, right, traumatic, complicated, subsequent encounter        Social History:    Social History     Tobacco Use   Smoking Status Never Smoker   Smokeless Tobacco Never Used       Family History:  No family history on file.     Current Medications:  Current Outpatient Medications on File Prior to Visit   Medication Sig Dispense Refill    predniSONE (DELTASONE) 10 MG tablet 40 mg x 14 days 30 mg x 14 days 20 mg x 14 days 10 mg after      Cholecalciferol (VITAMIN D3 PO) Take by mouth      omeprazole (PRILOSEC) 40 MG delayed release capsule Take 40 mg by mouth      albuterol sulfate HFA (PROVENTIL HFA) 108 (90 Base) MCG/ACT inhaler Inhale 2 puffs into the lungs every 6 hours as needed for Wheezing With spacer 1 Inhaler 3     No current facility-administered medications on file prior to visit. REVIEW OF SYSTEMS:    Review of Systems   Constitutional: Negative for chills, fatigue and fever. HENT: Negative for congestion. Respiratory: Positive for cough and shortness of breath (on exertion ). Negative for chest tightness and wheezing. Cardiovascular: Negative for chest pain, palpitations and leg swelling. Neurological: Negative for weakness. Psychiatric/Behavioral: The patient is not nervous/anxious. All other systems reviewed and are negative. Objective:   PHYSICAL EXAM:        VITALS:  /82 (Site: Left Upper Arm, Position: Sitting, Cuff Size: Medium Adult)   Pulse 113   Temp 97.1 °F (36.2 °C) (Infrared)   Ht 5' 8\" (1.727 m)   Wt 148 lb (67.1 kg)   SpO2 95%   BMI 22.50 kg/m²     Physical Exam  Vitals signs reviewed. Constitutional:       Appearance: He is well-developed. HENT:      Head: Normocephalic and atraumatic. Eyes:      Extraocular Movements: Extraocular movements intact. Pupils: Pupils are equal, round, and reactive to light. Neck:      Musculoskeletal: Normal range of motion and neck supple. Vascular: No JVD. Cardiovascular:      Rate and Rhythm: Normal rate and regular rhythm. Heart sounds: No murmur. Pulmonary:      Effort: Pulmonary effort is normal. No respiratory distress. Breath sounds: No stridor. Rales (bibasilar rales) present. No wheezing. Abdominal:      General: Bowel sounds are normal.      Palpations: Abdomen is soft. Musculoskeletal:         General: No deformity. Left lower leg: No edema. Comments: Right leg amputation   Skin:     General: Skin is warm and dry. Neurological:      Mental Status: He is alert and oriented to person, place, and time. Psychiatric:         Behavior: Behavior normal.         DATA:    CXR on 4/7/21  PORTABLE AP CHEST AT 2206 HOURS:         HISTORY: Shortness of breath.       COMPARISON: None.          FINDINGS: The heart is normal in size. Inspiration is somewhat limited. There is a diffuse accentuation of pulmonary interstitial markings seen throughout both lungs. No focal alveolar infiltrates or effusions are seen.       Bony structures are unremarkable.            Impression       Diffuse accentuation of pulmonary interstitial markings throughout both lungs, which may be either acute or chronic. Interstitial pneumonitis, or possible underlying fibrosis can give this appearance. Limited inspiration.       Correlate clinically. CT chest on 9/8/20 (comparison with CT chest on 12/4/18  CT pattern indeterminate for UIP. Not significantly changed diffuse peripheral fibrosis with traction bronchiectasis, slightly more prominent in the mid and upper lung zones. fe ti would include chronic hypersensitivity pneumonitis, amongst other etiologies. Assessment:      Diagnosis Orders   1. IPF (idiopathic pulmonary fibrosis) (HCC)  CT GUIDED FNA   2. Lung mass  CT GUIDED FNA    CT NEEDLE BIOPSY LUNG PERCUTANEOUS   3. Chronic respiratory failure with hypoxia [facility])         Plan:   77year old male with ILD. Prior CT scan is indeterminate for UIP. No definite rheumatologic condition. CRP was low in 2018. He was on cipro for years for apparently chronic osteomyelitis, however he stopped it in 2000 after amputation of RLE. Now he walks with prosthesis and has reasonable functional capacity. CT scan was done. Images reviewed with the patient and family and compared to prior CT scan on 9/8/2020. It is consistent with UIP given the geographic and temporal heterogenicity. However there is and area in the RUY that is concerning.   Although there is small area air bronchogram given he doesn't have signs or symptoms of pneumonia but has loss of appetite and some wt loss, I will get a CT guided biopsy to r/o malignancy as IPF increase the risk for lung cancer. In comparison with prior CT there is significant progression. Had PFT show severe restriction with TLC of 41. FEV1 is 58% predicted. DLCO is 33% predicted. Sed rate and CRP are mildly elevated at 31 and 8.2  Hypersensitivity panel is negative   NICKY with reflex is negative   NICKY is < 1:20  RF is < 10    All above mentioned data is consistent with the diagnosis of IPF. Prognosis of disease discussed   6MWT in office show significant desaturation requiring 4 liters with exertion     Plan   Start O2 to be used with exertion at 4 lpm  Will start him on Esbriet. Potential adverse effects and the need for monitoring discussed. Keep off prednisone.    Will get CT guided biopsy for the RUY mass like density given there is a plan to refer him to [facility] for evaluation for lung transplant if this biopsy is negative for malignancy

## 2021-05-13 NOTE — PROCEDURES
Pulmonary Function Test:     Indication: ILD    Never smoked    Test comment:     Spirometry data is acceptable and reproducible. Maximum effort given during the test.    Pulse ox is 96% on room air    Estimated body mass index is 22.5 kg/m² as calculated from the following:    Height as of 5/13/21: 5' 8\" (1.727 m). Weight as of 5/13/21: 148 lb (67.1 kg). Spirometry data:    FEV1/FVC: 88. Predicted ratio 74    Pre-Bronchodilator FEV1 1.79L, which is 58% predicted    Post-Bronchodilator FEV1 1.82L, which is 59% predicted    There is 2% reversibility     FVC is 2.03L, which is 48% predicted    Lung Volumes:    TLC (by Plethysmography) is 2.72L, which is 41% predicted    RV is 0.53L which is 23% predicted    Diffusion Capacity:    DLCO is 10.4 which is 33% predicted    Impression:    1. There is no obstruction present. There is severe restriction present     2. There is no significant reversibility with bronchodilator        [Increase in FEV1 => 12% of control and => 200 ml]    3. There is no significant hyperinflation or air trapping    4.  There is severe reduction in diffusion capacity    Comment:   PFT findings consistent with the above mentioned diagnosis of ILD with severe restrictive physiology and severe reduction in diffusion capacity

## 2021-05-13 NOTE — TELEPHONE ENCOUNTER
CT guided biopsy   Friday May 21st   At [facility]  Arrive 8:00 am   Biopsy 9:30 am  Nothing by mouth 4 hours prior  No blood thinners 5 days prior  Covid test 5/14  Go to main entrance to sign in at registration   Mr. Thomas Bashir verbalized understanding

## 2021-05-14 ENCOUNTER — HOSPITAL ENCOUNTER (OUTPATIENT)
Dept: PHYSICAL THERAPY | Age: 66
Setting detail: THERAPIES SERIES
Discharge: HOME | End: 2021-05-14
Payer: MEDICARE

## 2021-05-14 PROCEDURE — 97110 THERAPEUTIC EXERCISES: CPT | Performed by: SPECIALIST/TECHNOLOGIST

## 2021-05-14 PROCEDURE — 97140 MANUAL THERAPY 1/> REGIONS: CPT | Performed by: SPECIALIST/TECHNOLOGIST

## 2021-05-14 PROCEDURE — G0283 ELEC STIM OTHER THAN WOUND: HCPCS | Performed by: SPECIALIST/TECHNOLOGIST

## 2021-05-14 PROCEDURE — 97112 NEUROMUSCULAR REEDUCATION: CPT | Performed by: SPECIALIST/TECHNOLOGIST

## 2021-05-14 NOTE — FLOWSHEET NOTE
[facility]; Orthopaedics and Sports Rehabilitation; [facility]         Physical Therapy Treatment Note/ Progress Report:           Date:  2021  Patient Name:  Kristal Epps    :  1955  MRN: [id number]  Restrictions/Precautions:    Medical/Treatment Diagnosis Information:  ·   Diagnosis: lbp - m54.5  Treatment Diagnosis: lbp - Z44.8     Insurance/Certification information:     Physician Information:    Dr. Kassandra Leo  Has the plan of care been signed (Y/N):        []  Yes  [x]  No     Date of Patient follow up with Physician:       Is this a Progress Report:     []  Yes  [x]  No        If Yes:  Date Range for reporting period:  Beginning 21  Ending    Progress report will be due (10 Rx or 30 days whichever is less):  69      Recertification will be due (POC Duration  / 90 days whichever is less): 21        Visit # Insurance Allowable Auth Required   6  []  Yes []  No        Functional Scale:    Date assessed:        Latex Allergy:  [x]NO      []YES  Preferred Language for Healthcare:   [x]English       []other:      Pain level:  4-5/10     SUBJECTIVE:  Pt. Reports his back feels a little better. Pt. Reports most of his pain is now in his left hip and in the left side of his low back.         OBJECTIVE:    Observation:    Test measurements:      RESTRICTIONS/PRECAUTIONS:     Exercises/Interventions:       Therapeutic Ex (11441) Sets/sec Reps Notes/CUES   dktc   10 x 10\" B    LTR with SB  15 x 5\"    Supine piriformis   2 x 30\" B     Supine HS stretch 90/90   2x 30\" B     Supine abd brace   20 x 5\"     Prone glut sets  20 x 5\"    Prone press up - elbows  10 x 5\"          SLR abd  X 15 B                            Manual Intervention (70233)      STM - bilateral paraspinals, right glut, PA mobs, + massage - right / left paraspinal, right piriformis, Thera gun  15' Therapeutic Exercise and NMR EXR  [x] (18957) Provided verbal/tactile cueing for activities related to strengthening, flexibility, endurance, ROM for improvements in LE, proximal hip, and core control with self care, mobility, lifting, ambulation. [x] (22718) Provided verbal/tactile cueing for activities related to improving balance, coordination, kinesthetic sense, posture, motor skill, proprioception to assist with LE, proximal hip, and core control in self-care, mobility, lifting, ambulation and eccentric single leg control.      NMR and Therapeutic Activities:    [x] (18145 or 50606) Provided verbal/tactile cueing for activities related to improving balance, coordination, kinesthetic sense, posture, motor skill, proprioception and motor activation to allow for proper function of core, proximal hip and LE with self-care and ADLs and functional mobility.   [] (38458) Gait Re-education- Provided training and instruction to the patient for proper LE, core and proximal hip recruitment and positioning and eccentric body weight control with ambulation re-education including up and down stairs     Home Exercise Program:    [x] (17398) Reviewed/Progressed HEP activities related to strengthening, flexibility, endurance, ROM of core, proximal hip and LE for functional self-care, mobility, lifting and ambulation/stair navigation   [] (20338) Reviewed/Progressed HEP activities related to improving balance, coordination, kinesthetic sense, posture, motor skill, proprioception of core, proximal hip and LE for self-care, mobility, lifting, and ambulation/stair navigation      Manual Treatments:  PROM / STM / Oscillations-Mobs:  G-I, II, III, IV (PA's, Inf., Post.)  [x] (52848) Provided manual therapy to mobilize LE, proximal hip and/or LS spine soft tissue/joints for the purpose of modulating pain, promoting relaxation, increasing ROM, reducing/eliminating soft tissue swelling/inflammation/restriction, improving soft tissue extensibility and allowing for proper ROM for normal function with self-care, mobility, lifting and ambulation. Modalities:  EGS + MHP 15' - sitting    [] GAME READY (VASO)- for significant edema, swelling, pain control. Charges:  Timed Code Treatment Minutes: 45'    Total Treatment Minutes: 39'       [] EVAL (LOW) 18120 (typically 20 minutes face-to-face)  [] EVAL (MOD) 59221 (typically 30 minutes face-to-face)  [] EVAL (HIGH) 22788 (typically 45 minutes face-to-face)  [] RE-EVAL     [x] IJ(51614) x   1  [] IONTO  [x] NMR (83803) x  1   [] VASO  [x] Manual (50337) x    1 [] Other:  [] TA x      [] Mech Traction (43763)  [] ES(attended) (44836)      [x] ES (un) (23093):         ASSESSMENT:  Pt. Tolerated tx better than the previous PT sessions. Pt. Required cues with exercises and HEP for proper techniques. TTP over bilateral paraspinal and right piriformis / glut med area. GOALS:   Patient stated goal:     [] Progressing: [] Met: [] Not Met: [] Adjusted    Therapist goals for Patient:   Short Term Goals: To be achieved in: 2 weeks  1. Independent in HEP and progression per patient tolerance, in order to prevent re-injury. [] Progressing: [] Met: [] Not Met: [] Adjusted   2. Patient will have a decrease in pain to facilitate improvement in movement, function, and ADLs as indicated by Functional Deficits. [] Progressing: [] Met: [] Not Met: [] Adjusted    Long Term Goals: To be achieved in:   12 weeks  - The patient is expected to demonstrate less than 25% impairment, limitation or restriction in:  - changing and maintaining body position     - Patient will demonstrate an increase in Strength to 4+/5 hips to allow for proper functional mobility as indicated by patients Functional Deficits.    [] Progressing: [] Met: [] Not Met: [] Adjusted    - Patient will return to previous functional activities without increased symptoms or restriction. [] Progressing: [] Met: [] Not Met: [] Adjusted    - lumbar rom within 15% of global normal    [] Progressing: [] Met: [] Not Met: [] Adjusted    - mild point tenderness @ right lumbar   [] Progressing: [] Met: [] Not Met: [] Adjusted    Overall Progression Towards Functional goals/ Treatment Progress Update:  [] Patient is progressing as expected towards functional goals listed. [] Progression is slowed due to complexities/Impairments listed. [] Progression has been slowed due to co-morbidities. [x] Plan just implemented, too soon to assess goals progression <30days   [] Goals require adjustment due to lack of progress  [] Patient is not progressing as expected and requires additional follow up with physician  [] Other    Prognosis for POC: [x] Good [] Fair  [] Poor      Patient requires continued skilled intervention: [x] Yes  [] No    Treatment/Activity Tolerance:  [x] Patient able to complete treatment  [] Patient limited by fatigue  [] Patient limited by pain    [] Patient limited by other medical complications  [] Other:         Return to Play: (if applicable)   []  Stage 1: Intro to Strength   []  Stage 2: Return to Run and Strength   []  Stage 3: Return to Jump and Strength   []  Stage 4: Dynamic Strength and Agility   []  Stage 5: Sport Specific Training     []  Ready to Return to Play, Meets All Above Stages   []  Not Ready for Return to Sports   Comments:                               PLAN: See eval  [x] Continue per plan of care [] Alter current plan (see comments above)  [] Plan of care initiated [] Hold pending MD visit [] Discharge      Electronically signed by:  Kemar Chang, PTA  [id number],  Trevor Reyes PT, MPT     Note: If patient does not return for scheduled/ recommended follow up visits, this note will serve as a discharge from care along with most recent update on progress.

## 2021-05-17 ENCOUNTER — OFFICE VISIT (OUTPATIENT)
Dept: PRIMARY CARE CLINIC | Age: 66
End: 2021-05-17
Payer: MEDICARE

## 2021-05-17 VITALS
HEART RATE: 120 BPM | SYSTOLIC BLOOD PRESSURE: 131 MMHG | HEIGHT: 68 IN | WEIGHT: 147.4 LBS | BODY MASS INDEX: 22.34 KG/M2 | DIASTOLIC BLOOD PRESSURE: 76 MMHG

## 2021-05-17 DIAGNOSIS — J84.10 PULMONARY FIBROSIS (HCC): Primary | ICD-10-CM

## 2021-05-17 DIAGNOSIS — J96.11 CHRONIC RESPIRATORY FAILURE WITH HYPOXIA (HCC): ICD-10-CM

## 2021-05-17 DIAGNOSIS — Z01.818 PRE-OP EXAMINATION: Primary | ICD-10-CM

## 2021-05-17 DIAGNOSIS — R63.4 WEIGHT LOSS: ICD-10-CM

## 2021-05-17 PROCEDURE — G8420 CALC BMI NORM PARAMETERS: HCPCS | Performed by: STUDENT IN AN ORGANIZED HEALTH CARE EDUCATION/TRAINING PROGRAM

## 2021-05-17 PROCEDURE — 99214 OFFICE O/P EST MOD 30 MIN: CPT | Performed by: STUDENT IN AN ORGANIZED HEALTH CARE EDUCATION/TRAINING PROGRAM

## 2021-05-17 PROCEDURE — 1123F ACP DISCUSS/DSCN MKR DOCD: CPT | Performed by: STUDENT IN AN ORGANIZED HEALTH CARE EDUCATION/TRAINING PROGRAM

## 2021-05-17 PROCEDURE — 3017F COLORECTAL CA SCREEN DOC REV: CPT | Performed by: STUDENT IN AN ORGANIZED HEALTH CARE EDUCATION/TRAINING PROGRAM

## 2021-05-17 PROCEDURE — G8427 DOCREV CUR MEDS BY ELIG CLIN: HCPCS | Performed by: STUDENT IN AN ORGANIZED HEALTH CARE EDUCATION/TRAINING PROGRAM

## 2021-05-17 PROCEDURE — 1036F TOBACCO NON-USER: CPT | Performed by: STUDENT IN AN ORGANIZED HEALTH CARE EDUCATION/TRAINING PROGRAM

## 2021-05-17 PROCEDURE — 99211 OFF/OP EST MAY X REQ PHY/QHP: CPT | Performed by: NURSE PRACTITIONER

## 2021-05-17 PROCEDURE — 4040F PNEUMOC VAC/ADMIN/RCVD: CPT | Performed by: STUDENT IN AN ORGANIZED HEALTH CARE EDUCATION/TRAINING PROGRAM

## 2021-05-17 SDOH — ECONOMIC STABILITY: FOOD INSECURITY: WITHIN THE PAST 12 MONTHS, THE FOOD YOU BOUGHT JUST DIDN'T LAST AND YOU DIDN'T HAVE MONEY TO GET MORE.: NEVER TRUE

## 2021-05-17 ASSESSMENT — SOCIAL DETERMINANTS OF HEALTH (SDOH): HOW HARD IS IT FOR YOU TO PAY FOR THE VERY BASICS LIKE FOOD, HOUSING, MEDICAL CARE, AND HEATING?: NOT HARD AT ALL

## 2021-05-17 ASSESSMENT — ENCOUNTER SYMPTOMS
CHEST TIGHTNESS: 1
SHORTNESS OF BREATH: 1
WHEEZING: 1

## 2021-05-17 NOTE — PROGRESS NOTES
2021     Kristal Epps (:  1955) is a 77 y.o. male, here for evaluation of the following medical concerns:    HPI  Pulmonary fibrosis: Patient presents today for evaluation of shortness of breath. I inherited him with a diagnosis of idiopathic pulmonary fibrosis. At his most recent visit he was started on a steroid burst and referred to pulmonology. Since his visit, the pulmonologist stopped his steroids. A CT scan of his chest was updated, which was inconsistent with pulmonary fibrosis. It also demonstrated a nodule in the upper lobe of one of his lungs. She has a CT guided biopsy planned for later this week. He continues to complain of daily shortness of breath and exertional dyspnea. He is working on obtaining home oxygen. It was also recommended that he be referred to pulmonary rehab. They are working on getting approved for a biologic medication and getting on the list for lung transplant. He denies fevers, chills, cough or productive sputum. Review of Systems   Constitutional: Positive for activity change and unexpected weight change. Negative for fatigue. Respiratory: Positive for chest tightness, shortness of breath and wheezing. Cardiovascular: Positive for palpitations. Negative for chest pain. Neurological: Negative for dizziness and light-headedness. Psychiatric/Behavioral: The patient is nervous/anxious. Prior to Visit Medications    Medication Sig Taking?  Authorizing Provider   Cholecalciferol (VITAMIN D3 PO) Take by mouth Yes Historical Provider, MD   omeprazole (PRILOSEC) 40 MG delayed release capsule Take 40 mg by mouth Yes Historical Provider, MD   albuterol sulfate HFA (PROVENTIL HFA) 108 (90 Base) MCG/ACT inhaler Inhale 2 puffs into the lungs every 6 hours as needed for Wheezing With spacer Yes Migdalia Glass MD   Pirfenidone (ESBRIET) 267 MG TABS Days 1 to 7: 267 mg 3 times daily (total dose: 801 mg/day) Days 8 to 14: 534 mg 3 times daily (total dose: 1,602 mg/day) Day 15 and thereafter: 801 mg 3 times daily (total dose: 2,403 mg/day)  Patient not taking: Reported on 5/17/2021  Kirsten Fermin MD        Social History     Tobacco Use    Smoking status: Never Smoker    Smokeless tobacco: Never Used   Substance Use Topics    Alcohol use: Yes     Alcohol/week: 1.0 standard drinks     Types: 1 Cans of beer per week        Vitals:    05/17/21 1307   BP: 131/76   Pulse: 120   Weight: 147 lb 6.4 oz (66.9 kg)   Height: 5' 8\" (1.727 m)     Estimated body mass index is 22.41 kg/m² as calculated from the following:    Height as of this encounter: 5' 8\" (1.727 m). Weight as of this encounter: 147 lb 6.4 oz (66.9 kg). Physical Exam  Constitutional:       General: He is not in acute distress. Appearance: Normal appearance. He is normal weight. He is not ill-appearing. HENT:      Head: Normocephalic and atraumatic. Right Ear: Tympanic membrane normal.      Left Ear: Tympanic membrane normal.      Nose: Nose normal.      Mouth/Throat:      Mouth: Mucous membranes are moist.      Pharynx: Oropharynx is clear. Eyes:      Conjunctiva/sclera: Conjunctivae normal.   Cardiovascular:      Rate and Rhythm: Regular rhythm. Tachycardia present. Pulmonary:      Effort: Pulmonary effort is normal. No respiratory distress. Breath sounds: No wheezing or rales. Comments: Diminished breath sounds throughout  Abdominal:      General: Abdomen is flat. Bowel sounds are normal.      Palpations: Abdomen is soft. Musculoskeletal:         General: Deformity (Right lower extremity amputation) present. Comments: Lumbar spine is extremely rigid, range of motion is reduced in all directions, but most notably in flexion   Lymphadenopathy:      Cervical: No cervical adenopathy. Skin:     General: Skin is warm. Neurological:      Mental Status: He is alert. Mental status is at baseline.    Psychiatric:         Mood and Affect: Mood normal. ASSESSMENT/PLAN:  1. Pulmonary fibrosis ([facility] [address]): Saw pulmonology. Repeat high-res CT demonstrates lung nodule. Plans for CT biopsy this week. Working on getting him a biologic called Ha Gibbs. Needs home oxygen. I will work on getting this taken care of. We will also refer him to [facility] rehab. We will plan on seeing him back after his next pulmonology appointment. - [facility] Order for Home Oxygen as OP    2. Chronic respiratory failure with hypoxia (HCC)  Tachycardic at baseline and he is symptomatic, but it is physiologic and we can slow this down. Working on getting home oxygen. Improved set should slow his heart rate. 3. Weight loss  Likely related to increased metabolism due to his chronic respiratory failure. Discussed pleasure feeding with family and not restricting calories. Return in about 6 weeks (around 6/28/2021). An electronic signature was used to authenticate this note.     --Shahzad Jordan, DO on 5/17/2021 at 1:48 PM

## 2021-05-18 LAB — SARS-COV-2, PCR: NOT DETECTED

## 2021-05-19 ENCOUNTER — APPOINTMENT (OUTPATIENT)
Dept: PHYSICAL THERAPY | Age: 66
End: 2021-05-19
Payer: MEDICARE

## 2021-05-20 ENCOUNTER — TELEPHONE (OUTPATIENT)
Dept: PULMONOLOGY | Age: 66
End: 2021-05-20

## 2021-05-21 ENCOUNTER — HOSPITAL ENCOUNTER (OUTPATIENT)
Dept: GENERAL RADIOLOGY | Age: 66
Discharge: HOME | DRG: 200 | End: 2021-05-21
Payer: MEDICARE

## 2021-05-21 ENCOUNTER — HOSPITAL ENCOUNTER (INPATIENT)
Dept: CT IMAGING | Age: 66
LOS: 1 days | Discharge: HOME | DRG: 200 | End: 2021-05-22
Attending: INTERNAL MEDICINE | Admitting: INTERNAL MEDICINE
Payer: MEDICARE

## 2021-05-21 ENCOUNTER — HOSPITAL ENCOUNTER (OUTPATIENT)
Dept: GENERAL RADIOLOGY | Age: 66
Discharge: HOME | DRG: 200 | End: 2021-05-21
Attending: INTERNAL MEDICINE
Payer: MEDICARE

## 2021-05-21 ENCOUNTER — HOSPITAL ENCOUNTER (INPATIENT)
Dept: CT IMAGING | Age: 66
DRG: 200 | End: 2021-05-21
Attending: INTERNAL MEDICINE | Admitting: INTERNAL MEDICINE
Payer: MEDICARE

## 2021-05-21 ENCOUNTER — APPOINTMENT (OUTPATIENT)
Dept: PHYSICAL THERAPY | Age: 66
End: 2021-05-21
Payer: MEDICARE

## 2021-05-21 VITALS
HEART RATE: 80 BPM | TEMPERATURE: 96.9 F | OXYGEN SATURATION: 100 % | DIASTOLIC BLOOD PRESSURE: 77 MMHG | RESPIRATION RATE: 22 BRPM | SYSTOLIC BLOOD PRESSURE: 117 MMHG

## 2021-05-21 DIAGNOSIS — J95.811 PNEUMOTHORAX AFTER BIOPSY: ICD-10-CM

## 2021-05-21 DIAGNOSIS — J84.112 IPF (IDIOPATHIC PULMONARY FIBROSIS) (HCC): ICD-10-CM

## 2021-05-21 DIAGNOSIS — J95.811 POSTPROCEDURAL PNEUMOTHORAX: ICD-10-CM

## 2021-05-21 DIAGNOSIS — R91.8 LUNG MASS: ICD-10-CM

## 2021-05-21 PROBLEM — J93.9 PNEUMOTHORAX: Status: ACTIVE | Noted: 2021-05-21

## 2021-05-21 LAB
ANION GAP SERPL CALCULATED.3IONS-SCNC: 7 MMOL/L (ref 3–16)
APTT: 35.5 SEC (ref 24.2–36.2)
BUN BLDV-MCNC: 9 MG/DL (ref 7–20)
CALCIUM SERPL-MCNC: 8.7 MG/DL (ref 8.3–10.6)
CHLORIDE BLD-SCNC: 99 MMOL/L (ref 99–110)
CO2: 27 MMOL/L (ref 21–32)
CREAT SERPL-MCNC: 0.9 MG/DL (ref 0.8–1.3)
GFR AFRICAN AMERICAN: >60
GFR NON-AFRICAN AMERICAN: >60
GLUCOSE BLD-MCNC: 133 MG/DL (ref 70–99)
HCT VFR BLD CALC: 30.4 % (ref 40.5–52.5)
HEMOGLOBIN: 10.4 G/DL (ref 13.5–17.5)
INR BLD: 1.11 (ref 0.86–1.14)
MCH RBC QN AUTO: 33.2 PG (ref 26–34)
MCHC RBC AUTO-ENTMCNC: 34.1 G/DL (ref 31–36)
MCV RBC AUTO: 97.2 FL (ref 80–100)
PDW BLD-RTO: 17.3 % (ref 12.4–15.4)
PLATELET # BLD: 295 K/UL (ref 135–450)
PLATELET # BLD: 324 K/UL (ref 135–450)
PMV BLD AUTO: 7.5 FL (ref 5–10.5)
POTASSIUM REFLEX MAGNESIUM: 4.5 MMOL/L (ref 3.5–5.1)
PROTHROMBIN TIME: 12.9 SEC (ref 10–13.2)
RBC # BLD: 3.13 M/UL (ref 4.2–5.9)
SODIUM BLD-SCNC: 133 MMOL/L (ref 136–145)
WBC # BLD: 7.7 K/UL (ref 4–11)

## 2021-05-21 PROCEDURE — 71045 X-RAY EXAM CHEST 1 VIEW: CPT

## 2021-05-21 PROCEDURE — 88312 SPECIAL STAINS GROUP 1: CPT

## 2021-05-21 PROCEDURE — 99222 1ST HOSP IP/OBS MODERATE 55: CPT | Performed by: INTERNAL MEDICINE

## 2021-05-21 PROCEDURE — 7100000010 HC PHASE II RECOVERY - FIRST 15 MIN

## 2021-05-21 PROCEDURE — 88305 TISSUE EXAM BY PATHOLOGIST: CPT

## 2021-05-21 PROCEDURE — 6370000000 HC RX 637 (ALT 250 FOR IP): Performed by: INTERNAL MEDICINE

## 2021-05-21 PROCEDURE — 2500000003 HC RX 250 WO HCPCS: Performed by: RADIOLOGY

## 2021-05-21 PROCEDURE — 7100000001 HC PACU RECOVERY - ADDTL 15 MIN

## 2021-05-21 PROCEDURE — 0W9B30Z DRAINAGE OF LEFT PLEURAL CAVITY WITH DRAINAGE DEVICE, PERCUTANEOUS APPROACH: ICD-10-PCS | Performed by: RADIOLOGY

## 2021-05-21 PROCEDURE — 6360000002 HC RX W HCPCS: Performed by: RADIOLOGY

## 2021-05-21 PROCEDURE — 6370000000 HC RX 637 (ALT 250 FOR IP): Performed by: STUDENT IN AN ORGANIZED HEALTH CARE EDUCATION/TRAINING PROGRAM

## 2021-05-21 PROCEDURE — 93005 ELECTROCARDIOGRAM TRACING: CPT | Performed by: RADIOLOGY

## 2021-05-21 PROCEDURE — 7100000011 HC PHASE II RECOVERY - ADDTL 15 MIN

## 2021-05-21 PROCEDURE — 6370000000 HC RX 637 (ALT 250 FOR IP): Performed by: RADIOLOGY

## 2021-05-21 PROCEDURE — 80048 BASIC METABOLIC PNL TOTAL CA: CPT

## 2021-05-21 PROCEDURE — 85049 AUTOMATED PLATELET COUNT: CPT

## 2021-05-21 PROCEDURE — 85027 COMPLETE CBC AUTOMATED: CPT

## 2021-05-21 PROCEDURE — 94761 N-INVAS EAR/PLS OXIMETRY MLT: CPT

## 2021-05-21 PROCEDURE — 88341 IMHCHEM/IMCYTCHM EA ADD ANTB: CPT

## 2021-05-21 PROCEDURE — 2709999900 CT NEEDLE BIOPSY LUNG PERCUTANEOUS W IMAGING GUIDANCE

## 2021-05-21 PROCEDURE — 2709999900 CT GUIDED CHEST TUBE

## 2021-05-21 PROCEDURE — 1200000000 HC SEMI PRIVATE

## 2021-05-21 PROCEDURE — 85610 PROTHROMBIN TIME: CPT

## 2021-05-21 PROCEDURE — 7100000000 HC PACU RECOVERY - FIRST 15 MIN

## 2021-05-21 PROCEDURE — 0BBL3ZX EXCISION OF LEFT LUNG, PERCUTANEOUS APPROACH, DIAGNOSTIC: ICD-10-PCS | Performed by: RADIOLOGY

## 2021-05-21 PROCEDURE — 88342 IMHCHEM/IMCYTCHM 1ST ANTB: CPT

## 2021-05-21 PROCEDURE — 36415 COLL VENOUS BLD VENIPUNCTURE: CPT

## 2021-05-21 PROCEDURE — 2580000003 HC RX 258: Performed by: STUDENT IN AN ORGANIZED HEALTH CARE EDUCATION/TRAINING PROGRAM

## 2021-05-21 PROCEDURE — 85730 THROMBOPLASTIN TIME PARTIAL: CPT

## 2021-05-21 RX ORDER — ACETAMINOPHEN 650 MG/1
650 SUPPOSITORY RECTAL EVERY 6 HOURS PRN
Status: DISCONTINUED | OUTPATIENT
Start: 2021-05-21 | End: 2021-05-22 | Stop reason: HOSPADM

## 2021-05-21 RX ORDER — OXYCODONE HYDROCHLORIDE AND ACETAMINOPHEN 5; 325 MG/1; MG/1
1 TABLET ORAL EVERY 8 HOURS PRN
Status: DISPENSED | OUTPATIENT
Start: 2021-05-21

## 2021-05-21 RX ORDER — ALBUTEROL SULFATE 2.5 MG/3ML
2.5 SOLUTION RESPIRATORY (INHALATION) EVERY 4 HOURS PRN
Status: DISCONTINUED | OUTPATIENT
Start: 2021-05-21 | End: 2021-05-21

## 2021-05-21 RX ORDER — PROMETHAZINE HYDROCHLORIDE 25 MG/1
12.5 TABLET ORAL EVERY 6 HOURS PRN
Status: DISCONTINUED | OUTPATIENT
Start: 2021-05-21 | End: 2021-05-22 | Stop reason: HOSPADM

## 2021-05-21 RX ORDER — BENZONATATE 100 MG/1
100 CAPSULE ORAL 3 TIMES DAILY PRN
Status: DISPENSED | OUTPATIENT
Start: 2021-05-21

## 2021-05-21 RX ORDER — CODEINE PHOSPHATE AND GUAIFENESIN 10; 100 MG/5ML; MG/5ML
5 SOLUTION ORAL EVERY 4 HOURS PRN
Status: DISCONTINUED | OUTPATIENT
Start: 2021-05-21 | End: 2021-07-15

## 2021-05-21 RX ORDER — FENTANYL CITRATE 50 UG/ML
50 INJECTION, SOLUTION INTRAMUSCULAR; INTRAVENOUS ONCE
Status: COMPLETED | OUTPATIENT
Start: 2021-05-21 | End: 2021-05-21

## 2021-05-21 RX ORDER — SODIUM CHLORIDE 9 MG/ML
25 INJECTION, SOLUTION INTRAVENOUS PRN
Status: DISCONTINUED | OUTPATIENT
Start: 2021-05-21 | End: 2021-05-22 | Stop reason: HOSPADM

## 2021-05-21 RX ORDER — SODIUM CHLORIDE 0.9 % (FLUSH) 0.9 %
5-40 SYRINGE (ML) INJECTION PRN
Status: DISCONTINUED | OUTPATIENT
Start: 2021-05-21 | End: 2021-05-22 | Stop reason: HOSPADM

## 2021-05-21 RX ORDER — ONDANSETRON 2 MG/ML
4 INJECTION INTRAMUSCULAR; INTRAVENOUS EVERY 6 HOURS PRN
Status: DISCONTINUED | OUTPATIENT
Start: 2021-05-21 | End: 2021-05-22 | Stop reason: HOSPADM

## 2021-05-21 RX ORDER — PANTOPRAZOLE SODIUM 40 MG/1
40 TABLET, DELAYED RELEASE ORAL
Status: DISCONTINUED | OUTPATIENT
Start: 2021-05-22 | End: 2021-05-22 | Stop reason: HOSPADM

## 2021-05-21 RX ORDER — BENZONATATE 100 MG/1
100 CAPSULE ORAL 3 TIMES DAILY PRN
Status: DISCONTINUED | OUTPATIENT
Start: 2021-05-21 | End: 2021-05-22 | Stop reason: HOSPADM

## 2021-05-21 RX ORDER — SODIUM CHLORIDE 0.9 % (FLUSH) 0.9 %
5-40 SYRINGE (ML) INJECTION EVERY 12 HOURS SCHEDULED
Status: DISCONTINUED | OUTPATIENT
Start: 2021-05-21 | End: 2021-05-22 | Stop reason: HOSPADM

## 2021-05-21 RX ORDER — LIDOCAINE HYDROCHLORIDE 20 MG/ML
15 INJECTION, SOLUTION EPIDURAL; INFILTRATION; INTRACAUDAL; PERINEURAL ONCE
Status: COMPLETED | OUTPATIENT
Start: 2021-05-21 | End: 2021-05-21

## 2021-05-21 RX ORDER — ALBUTEROL SULFATE 2.5 MG/3ML
2.5 SOLUTION RESPIRATORY (INHALATION) EVERY 4 HOURS PRN
Status: DISCONTINUED | OUTPATIENT
Start: 2021-05-21 | End: 2021-05-22 | Stop reason: HOSPADM

## 2021-05-21 RX ORDER — OXYCODONE HYDROCHLORIDE AND ACETAMINOPHEN 5; 325 MG/1; MG/1
1 TABLET ORAL EVERY 6 HOURS PRN
Status: DISCONTINUED | OUTPATIENT
Start: 2021-05-21 | End: 2021-05-22 | Stop reason: HOSPADM

## 2021-05-21 RX ORDER — ACETAMINOPHEN 325 MG/1
650 TABLET ORAL EVERY 6 HOURS PRN
Status: DISCONTINUED | OUTPATIENT
Start: 2021-05-21 | End: 2021-05-22 | Stop reason: HOSPADM

## 2021-05-21 RX ORDER — POLYETHYLENE GLYCOL 3350 17 G/17G
17 POWDER, FOR SOLUTION ORAL DAILY PRN
Status: DISCONTINUED | OUTPATIENT
Start: 2021-05-21 | End: 2021-05-22 | Stop reason: HOSPADM

## 2021-05-21 RX ORDER — MIDAZOLAM HYDROCHLORIDE 1 MG/ML
1 INJECTION INTRAMUSCULAR; INTRAVENOUS ONCE
Status: COMPLETED | OUTPATIENT
Start: 2021-05-21 | End: 2021-05-21

## 2021-05-21 RX ADMIN — Medication 10 ML: at 20:26

## 2021-05-21 RX ADMIN — ACETAMINOPHEN 650 MG: 325 TABLET ORAL at 18:55

## 2021-05-21 RX ADMIN — FENTANYL CITRATE 25 MCG: 50 INJECTION, SOLUTION INTRAMUSCULAR; INTRAVENOUS at 10:11

## 2021-05-21 RX ADMIN — MIDAZOLAM HYDROCHLORIDE 0.5 MG: 2 INJECTION, SOLUTION INTRAMUSCULAR; INTRAVENOUS at 10:13

## 2021-05-21 RX ADMIN — OXYCODONE HYDROCHLORIDE AND ACETAMINOPHEN 1 TABLET: 5; 325 TABLET ORAL at 14:09

## 2021-05-21 RX ADMIN — BENZONATATE 100 MG: 100 CAPSULE ORAL at 16:18

## 2021-05-21 RX ADMIN — LIDOCAINE HYDROCHLORIDE 15 ML: 20 INJECTION, SOLUTION EPIDURAL; INFILTRATION; INTRACAUDAL; PERINEURAL at 10:13

## 2021-05-21 ASSESSMENT — PAIN SCALES - GENERAL
PAINLEVEL_OUTOF10: 2
PAINLEVEL_OUTOF10: 0
PAINLEVEL_OUTOF10: 0
PAINLEVEL_OUTOF10: 5
PAINLEVEL_OUTOF10: 0
PAINLEVEL_OUTOF10: 7

## 2021-05-21 ASSESSMENT — ENCOUNTER SYMPTOMS
BACK PAIN: 1
SHORTNESS OF BREATH: 1
ABDOMINAL PAIN: 0
COUGH: 1

## 2021-05-21 ASSESSMENT — PAIN DESCRIPTION - PAIN TYPE: TYPE: ACUTE PAIN

## 2021-05-21 ASSESSMENT — PAIN DESCRIPTION - ORIENTATION: ORIENTATION: LEFT;MID

## 2021-05-21 NOTE — PLAN OF CARE
Follow Up post Bx    Chest AP portable at 1129    INDICATIONS: Lung biopsy, left upper lobe    Delayed pneumothorax present approximately 5 day %    Pulmonary interstitial fibrosis, bilaterally    Left upper lobe mass, unchanged    Trachea is midline  Mild cardiac enlargement accentuated in the portable projection    Opinion:  1. Left pneumothorax, 5 8%.  Repeat radiograph in approximately 30 minutes will be scheduled    Patient may require chest tube

## 2021-05-21 NOTE — FLOWSHEET NOTE
Patient admitted to PACU # 16 from OR at 1430 post CT NEEDLE BIOPSY LUNG PERCUTANEOUS   per Dr. Adela Gimenez. Attached to PACU monitoring system and report received from [address] [place] [address],  [id number].  Patient arrived to PACU c/o CP that has been occurring since CT biopsy that is improving. Per Real Narayan pt was given pain medication and EKG was performed in [facility] that showed NSR. Pt L chest with bandaid from Bx is c/d/i. Pt has L Chest tube in place and free of air leak and drsg is c/d/i. Pt denies any SOB. Pt on 4 L NC. Pt waiting for PCU bed. Per Real Narayan pt ate lunch in SDS. Pt has R prothesis and cell phone at bedside. Will continue to monitor.

## 2021-05-21 NOTE — PROGRESS NOTES
1007: Pt to [facility] post left chest lung biopsy. Biopsy site clean, dry and intact. Pt denies pain at this time. Pt denies SOB. Pt receiving oxygen via nasal cannula at 2 LPM.   1025: Oxygen removed, pt SPO2 98% on room air, pt denies SOB. Cough noted, pt niece  states that \" the cough is the reason the biopsy was scheduled. Biopsy site noted to be clean, dry and intact. Pt tolerating PO fluids well, boxed lunch refused per pt request.   1055: Pt resting quietly. Discharge instructions given to pt and pt's niece , both state understanding of these instructions. Biopsy site unchanged, cough noted. Pt SpO2 95% at rest on room air. Pt denies pain at this time. Pt denies SOB at this time. 1150: Call received from [place] in CT, x-ray result revealed pneumothorax. Pt and niece  notified at this time. Pt denies shortness of breath at this time. Pt administered oxygen via nasal cannula at 2 LPM. Pt SpO2 98% with oxygen administered via nasal cannula. Biopsy site unchanged. Pt niece notified pt wife and pt wife in waiting room. Awaiting pt arrival back to [facility] room 7.

## 2021-05-21 NOTE — PROGRESS NOTES
PACU Transfer Note    Vitals:    05/21/21 1724   BP: 117/77   Pulse: 80   Resp: 22   Temp: 96.9 °F (36.1 °C)   SpO2: 100%       In: 720 [P.O.:720]  Out: 300 [Urine:300]    Pain assessment:  none  Pain Level: 2    Report given to Receiving unit RN.    5/21/2021 5:42 PM

## 2021-05-21 NOTE — H&P
IR  H & P      Patient:  Praveen Haro   :   1955      Relevant patient history reviewed and discussed. CC: Lung Biospy CT guided RUY  lesion    The procedure including risks and benefits was discussed at length with the patient (or designated family member) and all questions were answered. Informed consent to proceed with the procedure was given. Heart : regular rate and rhythm  Lungs : clear, breathing easily  Airway Assessment: Mallampati 2  Condition : stable    Ana Scale: Activity:  2 - Able to move 4 extremities voluntarily on command  Respiration:  2 - Able to breathe deeply and cough freely  Circulation:  2 - BP+/- 20mmHg of normal  Consciousness:  2 - Fully awake  Oxygen Saturation (color):  2 - Able to maintain oxygen saturation >92% on room air      [facility] nurses notes reviewed and agreed. Medications reviewed. Current Outpatient Medications on File Prior to Encounter   Medication Sig Dispense Refill    Pirfenidone (ESBRIET) 267 MG TABS Days 1 to 7: 267 mg 3 times daily (total dose: 801 mg/day) Days 8 to 14: 534 mg 3 times daily (total dose: 1,602 mg/day) Day 15 and thereafter: 801 mg 3 times daily (total dose: 2,403 mg/day) (Patient not taking: Reported on 2021) 207 tablet 3    Cholecalciferol (VITAMIN D3 PO) Take by mouth      omeprazole (PRILOSEC) 40 MG delayed release capsule Take 40 mg by mouth      albuterol sulfate HFA (PROVENTIL HFA) 108 (90 Base) MCG/ACT inhaler Inhale 2 puffs into the lungs every 6 hours as needed for Wheezing With spacer 1 Inhaler 3     No current facility-administered medications on file prior to encounter.      Allergies: No Known Allergies  Sedation : Moderate sedation planned  ASA 1 - Normal health patient     cleared

## 2021-05-21 NOTE — PROGRESS NOTES
(36.1 °C) (Oral)   Resp 20   Ht 5' 8\" (1.727 m)   Wt 147 lb 7.8 oz (66.9 kg)   SpO2 99%   BMI 22.43 kg/m²   SPO2 (COPD values may differ): 90-91% on room air or greater than 92% on FiO2 24- 28% = 1    Peak Flow (asthma only): not applicable = 0    RSI: 5-6 = Q4hr PRN (every four hours as needed) for dyspnea        Plan       Goals: medication delivery    Patient/caregiver was educated on the proper method of use for Respiratory Care Devices:  Yes      Level of patient/caregiver understanding able to:   ? Verbalize understanding   ? Demonstrate understanding       ? Teach back        ? Needs reinforcement       ? No available caregiver               ? Other:     Response to education:  Very Good     Is patient being placed on Home Treatment Regimen? Yes     Does the patient have everything they need prior to discharge? NA     Comments: pt assessed, chart reviewed, no distress noted     Plan of Care: albuterol HHN Q4prn, home 02 @ 2lpm    Electronically signed by Brenton Starkey RCP on 5/21/2021 at 6:59 PM    Respiratory Protocol Guidelines     1. Assessment and treatment by Respiratory Therapy will be initiated for medication and therapeutic interventions upon initiation of aerosolized medication. 2. Physician will be contacted for respiratory rate (RR) greater than 35 breaths per minute. Therapy will be held for heart rate (HR) greater than 140 beats per minute, pending direction from physician. 3. Bronchodilators will be administered via Metered Dose Inhaler (MDI) with spacer when the following criteria are met:  a. Alert and cooperative     b. HR < 140 bpm  c. RR < 30 bpm                d. Can demonstrate a 2-3 second inspiratory hold  4. Bronchodilators will be administered via Hand Held Nebulizer [facility]) to patients when ANY of the following criteria are met  a. Incognizant or uncooperative          b. Patients treated with HHN at Home        c.  Unable to demonstrate proper use of MDI with spacer d. RR > 30 bpm   5. Bronchodilators will be delivered via Metered Dose Inhaler (MDI), HHN, Aerogen to intubated patients on mechanical ventilation. 6. Inhalation medication orders will be delivered and/or substituted as outlined below. Aerosolized Medications Ordering and Administration Guidelines:    1. All Medications will be ordered by a physician, and their frequency and/or modality will be adjusted as defined by the patients Respiratory Severity Index (RSI) score. 2. If the patient does not have documented COPD, consider discontinuing anticholinergics when RSI is less than 9.  3. If the bronchospasm worsens (increased RSI), then the bronchodilator frequency can be increased to a maximum of every 4 hours. If greater than every 4 hours is required, the physician will be contacted. 4. If the bronchospasm improves, the frequency of the bronchodilator can be decreased, based on the patient's RSI, but not less than home treatment regimen frequency. 5. Bronchodilator(s) will be discontinued if patient has a RSI less than 9 and has received no scheduled or as needed treatment for 72  Hrs. Patients Ordered on a Mucolytic Agent:    1. Must always be administered with a bronchodilator. 2. Discontinue if patient experiences worsened bronchospasm, or secretions have lessened to the point that the patient is able to clear them with a cough. Anti-inflammatory and Combination Medications:    1. If the patient lacks prior history of lung disease, is not using inhaled anti-inflammatory medication at home, and lacks wheezing by examination or by history for at least 24 hours, contact physician for possible discontinuation.

## 2021-05-21 NOTE — PROGRESS NOTES
1425 Updated Report given to Mag at bedside in PACU 16 after transport. 46 Dr. Hieu Ambrose at bedside and states ' pt speaks Kingsley' or Gujarati if niece or wife is not present. Niece \"Christina\" cell number is [phone] and Wife \"Tarla\" is with niece and pt tolerating po well with continued coughing. 1409 percocet given to pt po. And Dr. Hieu Ambrose states 'family can feed pt food from home without telemetry before transferring to PACU' after reviewing 12Lead EKG. Call light in reach and family at bedside. Will monitor    1405 Dr. Hieu Ambrose here to see pt and aware that pt persistant coughing with CP and rating 7 sharp pain and called pharmacy once again and states 'should be in correct omnicell now' and states 'Ok to eat food from home'    1555 EKG obtained and called Nursing supervisor 'Radha\" and report given to Tricia Olivares in PACU to transfer d/t telemetry until 'PCU bed available' and spoke to pharmacy to 'release to SDS omnicell'     1345 Called for STAT EKG and said would be here \"ASAP\"    1335 Spoke to Sherry Cota to clarify persistant pt 'left sided chest pain at 7=sharp' with orders noted per Dr. Yudith Ruiz    1315 Received report from Sherry Cota with pt received from CT and chest tube to sealed container to Left chest wall drsg Dry and intact to -20mmHg suction with scant amount of serous to pink drainage noted in tubing. Noted pt with dry cough at times with VS stable and call light in reach with wife now at bedside and pt tolerating sips of water and juice. With pt c/o CP of 7 on pain scale with niece at bedside and Jacob Borges supervisor states 'orders are noted for admission and will call when a room is available for admission'. Will monitor. 1310 Received report from Loly Pt remains in [address] with niece here in room 7. Supervisor aware of pt being admitted with family aware and informed of 'visiting hours of 9a-7pm and not spending the night' per nursing supervisor with 'no room available at this time'.

## 2021-05-21 NOTE — H&P
I have seen the patient independently from the resident . I discussed the care with the resident. I personally reviewed the HPI, PH, FH, SH, ROS and medications. I repeated pertinent portions of the examination and reviewed the relevant imaging and laboratory data. I agree with the findings, assessment and plan as documented, with the following addendum:    78 yo male w/ lung nodule s/p ct guided biopsy complicated by pneumothorax requiring ct placement w/ underlying h/o pulmonary fibrosis. Cont to complain of cough but no sob or cp at this time. CT placed per IR/ management in house will be per pulm/ consulted. Repeat cxr. Cont o2 to keep sats> 92%. Order antitussives.       Claudette Bold, M.D.

## 2021-05-21 NOTE — PROGRESS NOTES
Patient admitted to room 4458 from PACU. Oriented to room, call light, and floor policies. Plan of care reviewed with patient/family. Pt is resting in bed and 5/10 pain at this time; no s/s of distress noted. Assessment completed; VSS. Tele in place reading NSR. Pt rates a Low fall risk; bed alarm deferred. Safety precautions in place; call light and bedside table within reach. Pt encouraged to call for needs or ambulation. Pt VU. Will continue to monitor. 4 Eyes Admission Assessment     I agree as the admission nurse that 2 RN's have performed a thorough Head to Toe Skin Assessment on the patient. ALL assessment sites listed below have been assessed on admission. Areas assessed by both nurses:   [x]   Head, Face, and Ears   [x]   Shoulders, Back, and Chest  [x]   Arms, Elbows, and Hands   [x]   Coccyx, Sacrum, and Ischum  [x]   Legs, Feet, and Heels        Does the Patient have Skin Breakdown?   No         Aly Prevention initiated:  No   Wound Care Orders initiated:  YONY Villalobos consulted for Pressure Injury (Stage 3,4, Unstageable, DTI, NWPT, and Complex wounds):  NA      Nurse 1 eSignature: Electronically signed by Anna Coe on 5/21/21 at 7:29 PM EDT    **SHARE this note so that the co-signing nurse is able to place an eSignature**    Nurse 2 eSignature: Electronically signed by Edel Heck RN on 5/21/21 at 8:49 PM EDT

## 2021-05-21 NOTE — H&P
Internal Medicine  PGY 1  History & Physical      CC pneumothorax     History Obtained From:  Patient, niece     HISTORY OF PRESENT ILLNESS:    78 y/o M with PMH of IPF, RLL osteomyelitis s/p amputation who presented to [facility] for RUY nodule biopsy with IR. Post procedure pt was doing well, and was hemodynamically stable. Shortly after his sats fell to 95% on room air but pt was asymptomatic other than his chronic cough. Post procedure CXR showed a 58% pneumothorax with repeat CXR about 30 mins later showing slight increase with tracheal deviation. He had a chest tube placed by IR Dr Adela Gimenez. On my examination he was having intermittent dry cough, complaining of pleuritic L sided chest pain. He did not appear in acute distress and VS were stable. He follows with Dr Linda Bianchi for his IPF. He has been treated previously in the past with steroids but was started on Esbriet but has not started taking it because of cost. Denies smoking, does note some recent weight loss. Denies night sweats, fever, chills. Past Medical History:        Diagnosis Date    Amputation of leg below knee, right, traumatic, complicated, subsequent encounter        Past Surgical History:        Procedure Laterality Date    CT GUIDED CHEST TUBE  5/21/2021    CT GUIDED CHEST TUBE 5/21/2021 [facility] CT SCAN    CT NEEDLE BIOPSY LUNG PERCUTANEOUS  5/21/2021    CT NEEDLE BIOPSY LUNG PERCUTANEOUS 5/21/2021 [facility] CT SCAN       Medications Priorto Admission:    Not in a hospital admission. Allergies:  Patient has no known allergies. Social History:   · TOBACCO:   reports that he has never smoked. He has never used smokeless tobacco.  · ETOH:   reports current alcohol use of about 1.0 standard drinks of alcohol per week. · DRUGS : denies   · Patient currently lives with family at home   ·   Family History:   No family history on file. Review of Systems   Constitutional: Negative for fatigue and fever.    Respiratory: Positive for cough and shortness of breath. Cardiovascular: Positive for chest pain (pleurtic left sided ). Negative for palpitations and leg swelling. Gastrointestinal: Negative for abdominal pain. Musculoskeletal: Positive for back pain (L sided pleurtic ). ROS: A 10 point review of systems was conducted, significant findings as noted in HPI. Physical Exam  Constitutional:       General: He is not in acute distress. Cardiovascular:      Rate and Rhythm: Normal rate and regular rhythm. Heart sounds: No murmur heard. Pulmonary:      Effort: Pulmonary effort is normal.      Comments: Crackles bilaterally   Lung sounds bilaterally   Abdominal:      Palpations: Abdomen is soft. Tenderness: There is no abdominal tenderness. Musculoskeletal:      Right lower leg: No edema. Left lower leg: No edema. Neurological:      Mental Status: He is alert. Physical exam:     There were no vitals filed for this visit. DATA:    Labs:  CBC:   Recent Labs     05/21/21  0810          BMP: No results for input(s): NA, K, CL, CO2, BUN, CREATININE, GLUCOSE, PHOS in the last 72 hours. Invalid input(s):  CA  LFT's: No results for input(s): AST, ALT, ALB, BILITOT, ALKPHOS in the last 72 hours. Troponin: No results for input(s): TROPONINI in the last 72 hours. BNP:No results for input(s): BNP in the last 72 hours. ABGs: No results for input(s): PHART, JEC8XBW, PO2ART in the last 72 hours. INR:   Recent Labs     05/21/21  0810   INR 1.11       U/A:No results for input(s): NITRITE, COLORU, PHUR, LABCAST, WBCUA, RBCUA, MUCUS, TRICHOMONAS, YEAST, BACTERIA, CLARITYU, SPECGRAV, LEUKOCYTESUR, UROBILINOGEN, BILIRUBINUR, BLOODU, GLUCOSEU, AMORPHOUS in the last 72 hours.     Invalid input(s): Zaynab Presto    CT GUIDED FNA    (Results Pending)           ASSESSMENT AND PLAN:    Pneumothorax - iatrogenic after lung nodule biopsy   - s/p chest tube placement by IR, no air leak noted, attached to suction   - currently hemodynamically stable, satting 100% on 2L NC  - will place on tele   - perocet 5 prn for pain   - tessalon peales prn for cough   - repeat cxr   - pulmonology consult to follow for chest tube   - EKG w/o acute ischemic changes or R heart strain     RUY nodule s/p biopsy   - 3 biopsies obtained, pending path (5/21)    UIP  - follows with Dr Santiago Gaines. Reports hx of chronic dry cough for about 4 yrs. CXR and CT chest findings consistent with UIP. Currently not on any medications.  Was supposed to be started on esbriet but pt has not 2/2 finances    Will discuss with attending physician Dr. Hilario Ramirez Status: Full code  FEN: Gen diet   PPX: lovenox   DISPO: PCU Mortimer Pall, MD  5/21/2021,  3:14 PM

## 2021-05-21 NOTE — PLAN OF CARE
Brief Postoperative Note    Praveen Haro  YOB: 1955  [id number]    Pre-operative Diagnosis: RUY mass increased size from prior    Post-operative Diagnosis: Same    Procedure: CT guided perc core biopsy    Anesthesia: Local  Conscious sedation    Surgeons/Assistants: Oli    Estimated Blood Loss: Minimal  <5.2DM  Complications: none    Specimens: were obtained  3 core biopsies    No pneumothorax with post CT imaging    Lizeth Alonzo MD MD  5/21/2021

## 2021-05-22 ENCOUNTER — APPOINTMENT (OUTPATIENT)
Dept: GENERAL RADIOLOGY | Age: 66
DRG: 200 | End: 2021-05-22
Attending: INTERNAL MEDICINE
Payer: MEDICARE

## 2021-05-22 VITALS
DIASTOLIC BLOOD PRESSURE: 85 MMHG | RESPIRATION RATE: 18 BRPM | HEIGHT: 68 IN | TEMPERATURE: 97.8 F | SYSTOLIC BLOOD PRESSURE: 110 MMHG | HEART RATE: 98 BPM | WEIGHT: 147.49 LBS | OXYGEN SATURATION: 100 % | BODY MASS INDEX: 22.35 KG/M2

## 2021-05-22 PROBLEM — E44.0 MODERATE MALNUTRITION (HCC): Chronic | Status: ACTIVE | Noted: 2021-05-22

## 2021-05-22 LAB
EKG ATRIAL RATE: 84 BPM
EKG DIAGNOSIS: NORMAL
EKG P AXIS: 22 DEGREES
EKG P-R INTERVAL: 180 MS
EKG Q-T INTERVAL: 372 MS
EKG QRS DURATION: 82 MS
EKG QTC CALCULATION (BAZETT): 439 MS
EKG R AXIS: 28 DEGREES
EKG T AXIS: 19 DEGREES
EKG VENTRICULAR RATE: 84 BPM
HCT VFR BLD CALC: 31.7 % (ref 40.5–52.5)
HEMOGLOBIN: 10.8 G/DL (ref 13.5–17.5)
MCH RBC QN AUTO: 33.1 PG (ref 26–34)
MCHC RBC AUTO-ENTMCNC: 34 G/DL (ref 31–36)
MCV RBC AUTO: 97.3 FL (ref 80–100)
PDW BLD-RTO: 16.6 % (ref 12.4–15.4)
PLATELET # BLD: 301 K/UL (ref 135–450)
PMV BLD AUTO: 7.7 FL (ref 5–10.5)
RBC # BLD: 3.26 M/UL (ref 4.2–5.9)
WBC # BLD: 6.3 K/UL (ref 4–11)

## 2021-05-22 PROCEDURE — 99233 SBSQ HOSP IP/OBS HIGH 50: CPT | Performed by: INTERNAL MEDICINE

## 2021-05-22 PROCEDURE — 93010 ELECTROCARDIOGRAM REPORT: CPT | Performed by: INTERNAL MEDICINE

## 2021-05-22 PROCEDURE — 6370000000 HC RX 637 (ALT 250 FOR IP): Performed by: STUDENT IN AN ORGANIZED HEALTH CARE EDUCATION/TRAINING PROGRAM

## 2021-05-22 PROCEDURE — 71045 X-RAY EXAM CHEST 1 VIEW: CPT

## 2021-05-22 PROCEDURE — 2580000003 HC RX 258: Performed by: STUDENT IN AN ORGANIZED HEALTH CARE EDUCATION/TRAINING PROGRAM

## 2021-05-22 PROCEDURE — 6360000002 HC RX W HCPCS: Performed by: STUDENT IN AN ORGANIZED HEALTH CARE EDUCATION/TRAINING PROGRAM

## 2021-05-22 PROCEDURE — 36415 COLL VENOUS BLD VENIPUNCTURE: CPT

## 2021-05-22 PROCEDURE — 85027 COMPLETE CBC AUTOMATED: CPT

## 2021-05-22 RX ADMIN — ENOXAPARIN SODIUM 40 MG: 40 INJECTION SUBCUTANEOUS at 08:07

## 2021-05-22 RX ADMIN — PANTOPRAZOLE SODIUM 40 MG: 40 TABLET, DELAYED RELEASE ORAL at 06:14

## 2021-05-22 RX ADMIN — Medication 10 ML: at 08:07

## 2021-05-22 RX ADMIN — ACETAMINOPHEN 650 MG: 325 TABLET ORAL at 12:29

## 2021-05-22 ASSESSMENT — PAIN SCALES - GENERAL
PAINLEVEL_OUTOF10: 4
PAINLEVEL_OUTOF10: 3
PAINLEVEL_OUTOF10: 0

## 2021-05-22 ASSESSMENT — PAIN DESCRIPTION - ORIENTATION: ORIENTATION: LEFT

## 2021-05-22 ASSESSMENT — PAIN DESCRIPTION - LOCATION: LOCATION: CHEST

## 2021-05-22 ASSESSMENT — ENCOUNTER SYMPTOMS
COUGH: 0
WHEEZING: 0
CHEST TIGHTNESS: 0

## 2021-05-22 NOTE — PROGRESS NOTES
After clamping the chest tube CXR obtained. There is no pneumothorax. Chest tube was removed without difficulty. Discussed with Dr Tal Alvarez, will discharge home. I will call him the the biopsy results. Need for pneumocystis prophylaxis

## 2021-05-22 NOTE — PROGRESS NOTES
Patient called c/o left chest pain and difficulty breathing. Left chest tube was recently clamped by Dr. John Lutz. No bubbling seen. Unclamped tube and notified Dr. John Lutz. Pain resolved and tube was re clamped to see if patient tolerates.

## 2021-05-22 NOTE — PROGRESS NOTES
NUTRITION ASSESSMENT  Admission Date: 5/21/2021     Type and Reason for Visit: Positive Nutrition Screen, Initial    NUTRITION RECOMMENDATIONS:   1. PO Diet: Continue general diet    2. ONS: start Ensure BID     NUTRITION ASSESSMENT:  Nutritional summary & status: + screen for wt loss and decreased po intakes. Pt reports that his appetite has been low x2 months. Wt loss per EMR of 3% over the past month. Discussed addition of ONS to aid in meeting nutritional needs, pt favorable. Will monitor nutritional status this admission. Patient admitted d/t pneumothorax     PMH significant for: IPF, RLL osteomyelitis s/p amputation    MALNUTRITION ASSESSMENT  Context of Malnutrition: Acute Illness   Malnutrition Status: Moderate malnutrition  Findings of the 6 clinical characteristics of malnutrition (Minimum of 2 out of 6 clinical characteristics is required to make the diagnosis of moderate or severe Protein Calorie Malnutrition based on AND/ASPEN Guidelines):  Energy Intake: Less than/equal to 75% of estimated energy requirements  -per pt   Energy Intake Time: Greater than or equal to 1 month    Weight Loss %: No significant loss   Weight loss Time: Greater than or equal to 1 month    Body Fat Loss: Mild Loss    Body Fat Location: Triceps   Body Muscle Loss: Mild Loss    Body Muscle Loss Location: Temples    Fluid Accumulation: No significant    Fluid Accumulation Location: No significant     Strength: Not Performed;  Not Measured     NUTRITION DIAGNOSIS   Problem: Problem #1: Inadequate oral intake   Etiology: Insufficient energy/nutrient consumption  Signs & Symptoms: pt report of poor po intakes     NUTRITION INTERVENTION  Food and/or Nutrient Delivery: Continue Current Diet, Start Oral Nutrition Supplement   Nutrition Education/Counseling: No recommendation at this time   Coordination of Nutrition Care: Continue to monitor while inpatient     NUTRITION MONITORING & EVALUATION:  Evaluation:Goals set Goals:Goals: Pt will consume >50% of meals/ONS this admission  Monitoring: Meal Intake , Supplement Intake  or Weight      OBJECTIVE DATA: Significant to nutrition assessment  · Nutrition-Focused Physical Findings: R BKA, no edema, BM 5/22   · Labs: Reviewed;   · Meds: Reviewed;   · Wounds None      ANTHROPOMETRICS  Current Height: 5' 8\" (172.7 cm)  Current Weight: 147 lb 7.8 oz (66.9 kg)    Admission weight: 147 lb 7.8 oz (66.9 kg)  Ideal Bodyweight 154 lb   Weight Adjustment For: Amputation  % Weight Adjustment : 5.9 - BKA  Adjusted Body Weight (Calculated): 155.7  Adjusted BMI (Calculated): 23.7  Usual Bodyweight ~158 per pt   Weight Changes -3% x 1 month        BMI BMI (Calculated): 22.5    Wt Readings from Last 50 Encounters:   05/21/21 147 lb 7.8 oz (66.9 kg)   05/17/21 147 lb 6.4 oz (66.9 kg)   05/13/21 148 lb (67.1 kg)   05/04/21 152 lb (68.9 kg)   04/13/21 155 lb (70.3 kg)   04/07/21 152 lb (68.9 kg)   03/28/20 158 lb 14.4 oz (72.1 kg)     COMPARATIVE STANDARDS  Estimated Total Kcals/Day : 25-30  Adjusted Bodyweight  (70 kg) 0401-2543 kcal/day    Estimated Total Protein (g/day) : 1.2-1.4 Adjusted Bodyweight  (70 kg) 84-98 g/day  Estimated Daily Total Fluid (ml/day): 5615-4888 mL per day     Food / Nutrition-Related History  Pre-Admission / Home Diet:  Pre-Admission/Home Diet: Vegetarian   Home Supplements / Herbals:    none noted  Food Restrictions / Cultural Requests:    none noted    Current Nutrition Therapies   DIET GENERAL;     PO Intake: 51-75%  PO Supplement: None   PO Supplement Intake: None   IVF: none     NUTRITION RISK LEVEL: Risk Level:  Moderate     Zander Gimenez LD  Huy:  [phone]  Office:  [phone]

## 2021-05-22 NOTE — PLAN OF CARE
Nutrition Problem #1: Inadequate oral intake  Intervention: Food and/or Nutrient Delivery: Continue Current Diet, Start Oral Nutrition Supplement  Nutritional Goals: Pt will consume >50% of meals/ONS this admission    Problem: Nutrition  Goal: Optimal nutrition therapy  Outcome: Ongoing

## 2021-05-22 NOTE — PROGRESS NOTES
Pulmonary & Critical Care Medicine    Admit Date: 2021  PCP: Liv Méndez,     CC:  pneumothorax  Events of Last 24 hours:   Overall he is doing well over the past 24 hours. Pain is better. There is no SOB at rest      There is no bubbling with breathing or cough from the chest tube. Vitals:  Tmax:  VITALS:  /80   Pulse 91   Temp 99 °F (37.2 °C) (Oral)   Resp 22   Ht 5' 8\" (1.727 m)   Wt 147 lb 7.8 oz (66.9 kg)   SpO2 100%   BMI 22.43 kg/m²   24HR INTAKE/OUTPUT:      Intake/Output Summary (Last 24 hours) at 2021 0940  Last data filed at 2021 0811  Gross per 24 hour   Intake 1400 ml   Output 1942 ml   Net -542 ml     CURRENT PULSE OXIMETRY:  SpO2: 100 %  24HR PULSE OXIMETRY RANGE:  SpO2  Av.5 %  Min: 95 %  Max: 100 %    EXAM:  General: No distress. Alert. Eyes: PERRL. No sclera icterus. No conjunctival injection. ENT: No discharge. Moist oral mucosa   Neck: Trachea midline. Neck is supple   Resp: bilateral rales. Normal respiratory effort. CV: Regular rate. Regular rhythm. No mumur or rub. No edema. GI: Non-tender. Non-distended. Normal bowel sounds. Skin: Warm and dry. No nodule on exposed extremities. No rash on exposed extremities. Neuro: Awake. Speech is clear. Psych: No anxiety or agitation. Medications:    IV:   sodium chloride           Scheduled Meds:   pantoprazole  40 mg Oral QAM AC    sodium chloride flush  5-40 mL Intravenous 2 times per day    enoxaparin  40 mg Subcutaneous Daily         Diet: DIET GENERAL;     Results:  CBC:   Recent Labs     21  0810 21  0422   WBC  --  7.7 6.3   HGB  --  10.4* 10.8*   HCT  --  30.4* 31.7*   MCV  --  97.2 97.3    295 301     BMP:   Recent Labs     21   *   K 4.5   CL 99   CO2 27   BUN 9   CREATININE 0.9     LIVER PROFILE: No results for input(s): AST, ALT, LIPASE, BILIDIR, BILITOT, ALKPHOS in the last 72 hours. Invalid input(s):   AMYLASE, ALB  PT/INR:   Recent Labs     05/21/21  0810   PROTIME 12.9   INR 1.11     APTT:   Recent Labs     05/21/21  0810   APTT 35.5     UA:No results for input(s): NITRITE, COLORU, PHUR, LABCAST, WBCUA, RBCUA, MUCUS, TRICHOMONAS, YEAST, BACTERIA, CLARITYU, SPECGRAV, LEUKOCYTESUR, UROBILINOGEN, BILIRUBINUR, BLOODU, GLUCOSEU, AMORPHOUS in the last 72 hours. Invalid input(s): [employer]      Assessment/Plan:  77 y.o. male with     Pneumothorax following CT guided biopsy of left upper lobe nodular density. IPF - newly diagnosed. FEV1 58%, DLCO 33%  Hypoxia with exertion.       This morning there was no bubbling with breathing or cough. CXR reviewed, there is no pneumothorax. Plan   Place tube to water seal for the next two hours. If it doesn't bubble will clamp and get CXR in 4 hours to reassess.    Hopefully we can remove the tube today.    Salina Thompson MD, MD

## 2021-05-22 NOTE — PROGRESS NOTES
No c/o shortness of breath or chest pain after chest tube pulled. Site drsg is clean dry intact. Patient states he already has oxygen set up at home. Discharge instructions given. Denies any further needs. Discharged to home with family.  Celestine Hernandez RN

## 2021-05-22 NOTE — PLAN OF CARE
Problem: Respiratory:  Goal: Ability to achieve and maintain a regular respiratory rate will improve  Description: Ability to achieve and maintain a regular respiratory rate will improve  5/22/2021 1630 by Gita Aly RN  Outcome: Ongoing  O2 sats >92%, weaning o2 as tolerated. Currenlty on 1L o2.  Gita Aly RN

## 2021-05-22 NOTE — PLAN OF CARE
Problem: Falls - Risk of:  Goal: Will remain free from falls  Description: Will remain free from falls  Outcome: Ongoing   Pt's bed is in lowest position, brake and bed exit alarm are on, call light and bed side table are within reach. Pt is a SBA and has prosthetic for R BKA. Problem: Pain:  Goal: Pain level will decrease  Description: Pain level will decrease  Outcome: Ongoing   Pt reported pain at beginning of shift. RN messaged MD and received PRN order for pain med. Pt denied needing pain medication throughout the shift. Problem: Physical Regulation:  Goal: Postoperative complications will be avoided or minimized  Description: Postoperative complications will be avoided or minimized  Outcome: Ongoing   Pt's chest tube was documented every two hours and is still to -20 suction. See flowsheets for further documentation. Problem: Respiratory:  Goal: Ability to achieve and maintain a regular respiratory rate will improve  Description: Ability to achieve and maintain a regular respiratory rate will improve  Outcome: Ongoing   Pt is on 3L O2 NC per pt request. Pt's SpO2 has been WNL. Problem: Skin Integrity:  Goal: Demonstration of wound healing without infection will improve  Description: Demonstration of wound healing without infection will improve  Outcome: Ongoing   Pt showed no s/s of infection.

## 2021-05-22 NOTE — PROGRESS NOTES
Internal Medicine PGY- 3 Resident Progress Note    PCP: Petra Mota DO    Date of Admission: 5/21/2021    Chief Complaint: pneumothorax    Subjective: reported that pain is well controlled. Denied sob, n/v, f/c, HA    Medications:  Reviewed    Infusion Medications    sodium chloride       Scheduled Medications    pantoprazole  40 mg Oral QAM AC    sodium chloride flush  5-40 mL Intravenous 2 times per day    enoxaparin  40 mg Subcutaneous Daily     PRN Meds: sodium chloride flush, sodium chloride, promethazine **OR** ondansetron, polyethylene glycol, acetaminophen **OR** acetaminophen, benzonatate, albuterol, oxyCODONE-acetaminophen      Intake/Output Summary (Last 24 hours) at 5/22/2021 1336  Last data filed at 5/22/2021 2732  Gross per 24 hour   Intake 1400 ml   Output 1942 ml   Net -542 ml       Physical Exam Performed:    /79   Pulse 104   Temp 99 °F (37.2 °C) (Oral)   Resp 20   Ht 5' 8\" (1.727 m)   Wt 147 lb 7.8 oz (66.9 kg)   SpO2 100%   BMI 22.43 kg/m²     General appearance: No apparent distress, appears stated age and cooperative. HEENT: Pupils equal, round, and reactive to light. Respiratory:  Chest tube in place, no bubbling w/ respiration and cough. Normal respiratory effort. Clear to auscultation, bilaterally without Rales/Wheezes/Rhonchi. Cardiovascular: Regular rate and rhythm with normal S1/S2 without murmurs, rubs or gallops. Abdomen: Soft, non-tender, non-distended with normal bowel sounds. Musculoskeletal: No clubbing, cyanosis or edema bilaterally. Neurologic:  Neurovascularly intact without any focal sensory/motor deficits.  Cranial nerves: II-XII intact, grossly non-focal.  Psychiatric: Alert and oriented, thought content appropriate, normal insight  Peripheral Pulses: +2 palpable, equal bilaterally     Labs:   Recent Labs     05/21/21  0810 05/21/21 1955 05/22/21  0422   WBC  --  7.7 6.3   HGB  --  10.4* 10.8*   HCT  --  30.4* 31.7*    295 301     Recent Labs 05/21/21 1955   *   K 4.5   CL 99   CO2 27   BUN 9   CREATININE 0.9   CALCIUM 8.7     No results for input(s): AST, ALT, BILIDIR, BILITOT, ALKPHOS in the last 72 hours. Recent Labs     05/21/21  0810   INR 1.11     No results for input(s): Lion Ang in the last 72 hours. Urinalysis:      Lab Results   Component Value Date    NITRU Negative 04/07/2021    WBCUA 0-2 04/07/2021    BACTERIA Rare 04/07/2021    RBCUA 3-4 04/07/2021    BLOODU TRACE-INTACT 04/07/2021    SPECGRAV 1.020 04/07/2021    GLUCOSEU 100 04/07/2021       Radiology:  XR CHEST PORTABLE   Final Result      Left-sided chest tube in place, with no evidence of pneumothorax. Diffuse underlying chronic pulmonary interstitial fibrosis. No superimposed acute findings. No other significant change in appearance of the heart or lungs from the prior study. XR CHEST PORTABLE   Final Result   1. Resolution of the left pneumothorax status post pigtail catheter placement. 2. Chronic interstitial lung disease with evidence of pulmonary fibrosis. 3. Left upper lobe airspace nodular opacity. 4. Blunting of the right costophrenic angle suggestive of tiny right pleural effusion. CT GUIDED FNA    (Results Pending)   XR CHEST PORTABLE    (Results Pending)         Assessment/Plan:    Pneumothorax after Biopsy:  - Chest tube in place, no more bubbling, clamped; CXR shows resolution of pneumothoax.  Will repeat in 4 hours  - Pulmonology following  - Pain mgmt, percocet    DVT Prophylaxis: lovenox  Diet: DIET GENERAL;  Dietary Nutrition Supplements: Standard High Calorie Oral Supplement  Code Status: Full Code    Discussed the patient with MD Sherrie Lamb MD, MD  Internal Medicine Resident PGY-3  Contact via Neema

## 2021-05-22 NOTE — CONSULTS
 albuterol sulfate HFA (PROVENTIL HFA) 108 (90 Base) MCG/ACT inhaler Inhale 2 puffs into the lungs every 6 hours as needed for Wheezing With spacer 1 Inhaler 3       Allergies:  Patient has no known allergies. Social History:   TOBACCO:   reports that he has never smoked. He has never used smokeless tobacco.     ETOH:   reports current alcohol use of about 1.0 standard drinks of alcohol per week. Family History:   No family history on file. Review of Systems   Constitutional: Negative for chills, fatigue, fever and unexpected weight change. HENT: Negative for congestion. Respiratory: Negative for cough, chest tightness and wheezing. Cardiovascular: Negative for palpitations and leg swelling. Neurological: Negative for weakness. Psychiatric/Behavioral: The patient is not nervous/anxious. All other systems reviewed and are negative. PHYSICAL EXAM:  /80   Pulse 92   Temp 97.6 °F (36.4 °C) (Oral)   Resp 16   Ht 5' 8\" (1.727 m)   Wt 147 lb 7.8 oz (66.9 kg)   SpO2 99%   BMI 22.43 kg/m²     Physical Exam  Vitals reviewed. Constitutional:       Appearance: He is well-developed. HENT:      Head: Normocephalic and atraumatic. Eyes:      Pupils: Pupils are equal, round, and reactive to light. Neck:      Vascular: No JVD. Cardiovascular:      Rate and Rhythm: Normal rate and regular rhythm. Heart sounds: No murmur heard. Pulmonary:      Effort: Pulmonary effort is normal. No respiratory distress. Breath sounds: Normal breath sounds. No stridor. No wheezing or rales. Abdominal:      General: Bowel sounds are normal.      Palpations: Abdomen is soft. Musculoskeletal:         General: No deformity. Cervical back: Neck supple. Right lower leg: No edema. Left lower leg: No edema. Skin:     General: Skin is warm and dry. Neurological:      Mental Status: He is alert and oriented to person, place, and time.    Psychiatric:         Behavior: Behavior normal.         LABS:  Recent Labs     05/21/21 0810 05/21/21 1955   WBC  --  7.7   HGB  --  10.4*   HCT  --  30.4*    295                                                                  Recent Labs     05/21/21 1955   *   K 4.5   CL 99   CO2 27   BUN 9   CREATININE 0.9   GLUCOSE 133*     No results for input(s): AST, ALT, ALB, BILITOT, ALKPHOS in the last 72 hours. No results for input(s): TROPONINI in the last 72 hours. No results for input(s): BNP in the last 72 hours. No results found for: PHART, QKY5KCE, PO2ART  Recent Labs     05/21/21 0810   INR 1.11     @LABRCNT(NITRITE,COLORU,PHUR,LABCAST,WBCUA,RBCUA,MUCUS,TRICHOMONAS,YEAST,BACTERIA,CLARITYU,SPECGRAV,LEUKOCYTESUR,UROBILINOGEN,BILIRUBINUR,BLOODU,GLUCOSEU,KETONESU,AMORPHOUS)@     DATA:  Impression   1. Satisfactory CT-guided placement of left chest tube, 14 [place] Sky tube   2. Satisfactory decompression of left pneumothorax connected to Pleur-evac, 20 cm water seal with low wall suction   3. The patient is admitted overnight with observation, the hospitalist       Assessment &Plan:    Patient Active Problem List:     IPF (idiopathic pulmonary fibrosis) (HCC)     Chronic bilateral low back pain without sciatica     Chronic respiratory failure with hypoxia ([address]     Pneumothorax after biopsy     Pneumothorax      Pneumothorax following CT guided biopsy of left upper lobe nodular density. IPF - newly diagnosed. FEV1 58%, DLCO 33%  Hypoxia with exertion. Keep chest tube to suction at -20  Get new CXR am.  If lung is fully inflated will place chest tube to water seal.      The patient and / or the family were informed of the results of any tests, a time was given to answer questions, a plan was proposed and they agreed with plan. Thank you Dr. Arlean Bence, DO for the opportunity to be involved in this patients care.  If you have any questions or concerns please feel free to contact me  Full Code

## 2021-05-23 NOTE — DISCHARGE SUMMARY
INTERNAL MEDICINE DEPARTMENT AT [address] [place] [address]  DISCHARGE SUMMARY    Patient ID: Tom Alaniz                                             Discharge Date: 5/23/2021   Patient's PCP: Willis Ramon DO                                          Discharge Physician: Mortimer Pall, MD MD  Admit Date: 5/21/2021   Admitting Physician: Kathya Dorman MD    PROBLEMS DURING HOSPITALIZATION:  Present on Admission:   Pneumothorax   Moderate malnutrition ([address]      DISCHARGE DIAGNOSES:  Pneumothorax following CT-guided biopsy of the left upper lobe nodular density  Interstitial pulmonary fibrosis    HPI:  78 y/o M with PMH of IPF, RLL osteomyelitis s/p amputation who presented to [address] [place] [address] for RUY nodule biopsy with IR. Post procedure pt was doing well, and was hemodynamically stable. Shortly after his sats fell to 95% on room air but pt was asymptomatic other than his chronic cough. Post procedure CXR showed a 58% pneumothorax with repeat CXR about 30 mins later showing slight increase with tracheal deviation. He had a chest tube placed by IR Dr Jean Gee. On my examination he was having intermittent dry cough, complaining of pleuritic L sided chest pain. He did not appear in acute distress and VS were stable. He follows with Dr Santiago Gaines for his IPF. He has been treated previously in the past with steroids but was started on Esbriet but has not started taking it because of cost.  He was admitted for further evaluation/management. The following issues were addressed during hospitalization:    Pneumothorax - iatrogenic after CT-guided lung biopsy of the left upper lobe nodular lesion  Patient had a chest tube placed by IR after pneumothorax was noted on chest x-ray post biopsy procedure. Chest x-ray after chest tube placement showed no pneumothorax. He had no leak noted in the chest tube. Pulmonology was consulted for further recommendations and chest tube management.  Received Percocet as needed for pain and Tessalon MCG/ACT inhaler  Commonly known as: Proventil HFA        ASK your doctor about these medications    Esbriet 267 MG Tabs  Generic drug: Pirfenidone  Days 1 to 7: 267 mg 3 times daily (total dose: 801 mg/day) Days 8 to 14: 534 mg 3 times daily (total dose: 1,602 mg/day) Day 15 and thereafter: 801 mg 3 times daily (total dose: 2,403 mg/day)     omeprazole 40 MG delayed release capsule  Commonly known as: PRILOSEC     VITAMIN D3 PO          Activity: activity as tolerated  Diet: regular diet  Wound Care: none needed    Time Spent on discharge is more than 30 minutes    Signed:  Ab Shea MD,  MD, PGY-1  5/23/2021

## 2021-06-01 ENCOUNTER — HOSPITAL ENCOUNTER (OUTPATIENT)
Dept: CARDIAC REHAB | Age: 66
Setting detail: THERAPIES SERIES
Discharge: HOME | End: 2021-06-01
Payer: MEDICARE

## 2021-06-01 ENCOUNTER — TELEPHONE (OUTPATIENT)
Dept: PULMONOLOGY | Age: 66
End: 2021-06-01

## 2021-06-01 PROCEDURE — G0239 OTH RESP PROC, GROUP: HCPCS

## 2021-06-04 ENCOUNTER — HOSPITAL ENCOUNTER (OUTPATIENT)
Dept: CARDIAC REHAB | Age: 66
Setting detail: THERAPIES SERIES
Discharge: HOME | End: 2021-06-04
Payer: MEDICARE

## 2021-06-04 PROCEDURE — G0239 OTH RESP PROC, GROUP: HCPCS

## 2021-06-07 ENCOUNTER — HOSPITAL ENCOUNTER (OUTPATIENT)
Dept: CARDIAC REHAB | Age: 66
Setting detail: THERAPIES SERIES
Discharge: HOME | End: 2021-06-07
Payer: MEDICARE

## 2021-06-07 PROCEDURE — G0239 OTH RESP PROC, GROUP: HCPCS

## 2021-06-09 ENCOUNTER — OFFICE VISIT (OUTPATIENT)
Dept: PULMONOLOGY | Age: 66
End: 2021-06-09
Payer: MEDICARE

## 2021-06-09 ENCOUNTER — TELEPHONE (OUTPATIENT)
Dept: PULMONOLOGY | Age: 66
End: 2021-06-09

## 2021-06-09 ENCOUNTER — HOSPITAL ENCOUNTER (OUTPATIENT)
Dept: CARDIAC REHAB | Age: 66
Setting detail: THERAPIES SERIES
Discharge: HOME | End: 2021-06-09
Payer: MEDICARE

## 2021-06-09 ENCOUNTER — OFFICE VISIT (OUTPATIENT)
Dept: PRIMARY CARE CLINIC | Age: 66
End: 2021-06-09
Payer: MEDICARE

## 2021-06-09 VITALS
BODY MASS INDEX: 22.13 KG/M2 | DIASTOLIC BLOOD PRESSURE: 75 MMHG | HEIGHT: 68 IN | SYSTOLIC BLOOD PRESSURE: 116 MMHG | HEART RATE: 116 BPM | WEIGHT: 146 LBS

## 2021-06-09 VITALS
OXYGEN SATURATION: 100 % | DIASTOLIC BLOOD PRESSURE: 72 MMHG | WEIGHT: 146 LBS | HEART RATE: 101 BPM | TEMPERATURE: 97 F | BODY MASS INDEX: 22.91 KG/M2 | SYSTOLIC BLOOD PRESSURE: 120 MMHG | HEIGHT: 67 IN

## 2021-06-09 DIAGNOSIS — F43.21 GRIEF REACTION: Primary | ICD-10-CM

## 2021-06-09 DIAGNOSIS — J84.112 IPF (IDIOPATHIC PULMONARY FIBROSIS) (HCC): Primary | ICD-10-CM

## 2021-06-09 DIAGNOSIS — J96.11 CHRONIC RESPIRATORY FAILURE WITH HYPOXIA (HCC): ICD-10-CM

## 2021-06-09 DIAGNOSIS — J84.112 IPF (IDIOPATHIC PULMONARY FIBROSIS) (HCC): ICD-10-CM

## 2021-06-09 PROBLEM — F32.1 CURRENT MODERATE EPISODE OF MAJOR DEPRESSIVE DISORDER WITHOUT PRIOR EPISODE (HCC): Status: RESOLVED | Noted: 2021-06-09 | Resolved: 2021-06-09

## 2021-06-09 PROBLEM — J93.9 PNEUMOTHORAX: Status: RESOLVED | Noted: 2021-05-21 | Resolved: 2021-06-09

## 2021-06-09 PROBLEM — F32.1 CURRENT MODERATE EPISODE OF MAJOR DEPRESSIVE DISORDER WITHOUT PRIOR EPISODE (HCC): Status: ACTIVE | Noted: 2021-06-09

## 2021-06-09 PROCEDURE — G8420 CALC BMI NORM PARAMETERS: HCPCS | Performed by: INTERNAL MEDICINE

## 2021-06-09 PROCEDURE — 4040F PNEUMOC VAC/ADMIN/RCVD: CPT | Performed by: STUDENT IN AN ORGANIZED HEALTH CARE EDUCATION/TRAINING PROGRAM

## 2021-06-09 PROCEDURE — 99214 OFFICE O/P EST MOD 30 MIN: CPT | Performed by: INTERNAL MEDICINE

## 2021-06-09 PROCEDURE — 1123F ACP DISCUSS/DSCN MKR DOCD: CPT | Performed by: INTERNAL MEDICINE

## 2021-06-09 PROCEDURE — G8427 DOCREV CUR MEDS BY ELIG CLIN: HCPCS | Performed by: INTERNAL MEDICINE

## 2021-06-09 PROCEDURE — 1111F DSCHRG MED/CURRENT MED MERGE: CPT | Performed by: STUDENT IN AN ORGANIZED HEALTH CARE EDUCATION/TRAINING PROGRAM

## 2021-06-09 PROCEDURE — 3017F COLORECTAL CA SCREEN DOC REV: CPT | Performed by: STUDENT IN AN ORGANIZED HEALTH CARE EDUCATION/TRAINING PROGRAM

## 2021-06-09 PROCEDURE — 3017F COLORECTAL CA SCREEN DOC REV: CPT | Performed by: INTERNAL MEDICINE

## 2021-06-09 PROCEDURE — G8420 CALC BMI NORM PARAMETERS: HCPCS | Performed by: STUDENT IN AN ORGANIZED HEALTH CARE EDUCATION/TRAINING PROGRAM

## 2021-06-09 PROCEDURE — G8427 DOCREV CUR MEDS BY ELIG CLIN: HCPCS | Performed by: STUDENT IN AN ORGANIZED HEALTH CARE EDUCATION/TRAINING PROGRAM

## 2021-06-09 PROCEDURE — 1036F TOBACCO NON-USER: CPT | Performed by: STUDENT IN AN ORGANIZED HEALTH CARE EDUCATION/TRAINING PROGRAM

## 2021-06-09 PROCEDURE — 1111F DSCHRG MED/CURRENT MED MERGE: CPT | Performed by: INTERNAL MEDICINE

## 2021-06-09 PROCEDURE — 1036F TOBACCO NON-USER: CPT | Performed by: INTERNAL MEDICINE

## 2021-06-09 PROCEDURE — 1123F ACP DISCUSS/DSCN MKR DOCD: CPT | Performed by: STUDENT IN AN ORGANIZED HEALTH CARE EDUCATION/TRAINING PROGRAM

## 2021-06-09 PROCEDURE — 99214 OFFICE O/P EST MOD 30 MIN: CPT | Performed by: STUDENT IN AN ORGANIZED HEALTH CARE EDUCATION/TRAINING PROGRAM

## 2021-06-09 PROCEDURE — G0239 OTH RESP PROC, GROUP: HCPCS

## 2021-06-09 PROCEDURE — 4040F PNEUMOC VAC/ADMIN/RCVD: CPT | Performed by: INTERNAL MEDICINE

## 2021-06-09 ASSESSMENT — ENCOUNTER SYMPTOMS
SHORTNESS OF BREATH: 1
WHEEZING: 1
CHEST TIGHTNESS: 1

## 2021-06-09 ASSESSMENT — PATIENT HEALTH QUESTIONNAIRE - PHQ9
SUM OF ALL RESPONSES TO PHQ9 QUESTIONS 1 & 2: 0
2. FEELING DOWN, DEPRESSED OR HOPELESS: 0
1. LITTLE INTEREST OR PLEASURE IN DOING THINGS: 0
SUM OF ALL RESPONSES TO PHQ QUESTIONS 1-9: 0

## 2021-06-09 NOTE — PATIENT INSTRUCTIONS
Patient Education        Recovering From Depression: Care Instructions  Your Care Instructions     Taking good care of yourself is important as you recover from depression. In time, your symptoms will fade as your treatment takes hold. Do not give up. Instead, focus your energy on getting better. Your mood will improve. It just takes some time. Focus on things that can help you feel better, such as being with friends and family, eating well, and getting enough rest. But take things slowly. Do not do too much too soon. You will begin to feel better gradually. Follow-up care is a key part of your treatment and safety. Be sure to make and go to all appointments, and call your doctor if you are having problems. It's also a good idea to know your test results and keep a list of the medicines you take. How can you care for yourself at home? Be realistic  · If you have a large task to do, break it up into smaller steps you can handle, and just do what you can. · You may want to put off important decisions until your depression has lifted. If you have plans that will have a major impact on your life, such as marriage, divorce, or a job change, try to wait a bit. Talk it over with friends and loved ones who can help you look at the overall picture first.  · Reaching out to people for help is important. Do not isolate yourself. Let your family and friends help you. Find someone you can trust and confide in, and talk to that person. · Be patient, and be kind to yourself. Remember that depression is not your fault and is not something you can overcome with willpower alone. Treatment is important for depression, just like for any other illness. Feeling better takes time, and your mood will improve little by little. Stay active  · Stay busy and get outside. Take a walk, or try some other light exercise. · Talk with your doctor about an exercise program. Exercise can help with mild depression. · Go to a movie or concert. Take part in a [employer] activity or other social gathering. Go to a [employer] game. · Ask a friend to have dinner with you. Take care of yourself  · Eat a balanced diet with plenty of fresh fruits and vegetables, whole grains, and lean protein. If you have lost your appetite, eat small snacks rather than large meals. · Avoid using illegal drugs or marijuana and drinking alcohol. Do not take medicines that have not been prescribed for you. They may interfere with medicines you may be taking for depression, or they may make your depression worse. · Take your medicines exactly as they are prescribed. You may start to feel better within 1 to 3 weeks of taking antidepressant medicine. But it can take as many as 6 to 8 weeks to see more improvement. If you have questions or concerns about your medicines, or if you do not notice any improvement by 3 weeks, talk to your doctor. · Continue to take your medicine after your symptoms improve. Taking your medicine for at least 6 months after you feel better can help keep you from getting depressed again. If this isn't the first time you have been depressed, your doctor may recommend you to take medicine even longer. · If you have any side effects from your medicine, tell your doctor. Many side effects are mild and will go away on their own after you have been taking the medicine for a few weeks. Some may last longer. Talk to your doctor if side effects are bothering you too much. You might be able to try a different medicine. · Continue counseling. It may help prevent depression from returning, especially if you've had multiple episodes of depression. Talk with your counselor if you are having a hard time attending your sessions or you think the sessions aren't working. Don't just stop going. · Get enough sleep. Talk to your doctor if you are having problems sleeping. · Avoid sleeping pills unless they are prescribed by the doctor treating your depression.  Sleeping pills may make you groggy during the day, and they may interact with other medicine you are taking. · If you have any other illnesses, such as diabetes, heart disease, or high blood pressure, make sure to continue with your treatment. Tell your doctor about all of the medicines you take, including those with or without a prescription. · If you or someone you know talks about suicide, self-harm, or feeling hopeless, get help right away. Call the [facility] at 2-869-710-ASUV ([phone]) or text HOME to 780412 to access the Crisis Text Line. Consider saving these numbers in your phone. When should you call for help? Call 911 anytime you think you may need emergency care. For example, call if:    · You feel like hurting yourself or someone else.     · Someone you know has depression and is about to attempt or is attempting suicide. Call your doctor now or seek immediate medical care if:    · You hear voices.     · Someone you know has depression and:  ? Starts to give away his or her possessions. ? Uses illegal drugs or drinks alcohol heavily. ? Talks or writes about death, including writing suicide notes or talking about guns, knives, or pills. ? Starts to spend a lot of time alone. ? Acts very aggressively or suddenly appears calm. Watch closely for changes in your health, and be sure to contact your doctor if:    · You do not get better as expected. Where can you learn more? Go to https://[employer]. org and sign in to your [employer] account. Enter I435 in the [facility] box to learn more about \"Recovering From Depression: Care Instructions. \"     If you do not have an account, please click on the \"Sign Up Now\" link. Current as of: September 23, 2020               Content Version: 12.8  © 1934-1142 Healthwise, Incorporated. Care instructions adapted under license by [facility] [employer] [facility] ([facility]).  If you have questions about a medical condition or this instruction,

## 2021-06-09 NOTE — PROGRESS NOTES
2021     Regino Vila (:  1955) is a 77 y.o. male, here for evaluation of the following medical concerns:    HPI  Mood disorder: Kristi Gibbons presents today for evaluation of depressed mood. He was seen at one of the outside doctors offices and had a depression screen which fell in a moderate to high category. He presents today for follow-up of this mood disorder. Most of his sadness is rooted in the fact that he has a terminal diagnosis of idiopathic pulmonary fibrosis. He was previously very independent individual and now requires a portable oxygen tank and is very dependent on his children for care. Today, we do long conversation about how his emotions are appropriate in the setting of his severe disease; however, if you have trouble coping with these emotions there are options for treatment. Today, he is pretty dismissive of these emotions. He denies suicidal homicidal ideation, depressed mood, anxiousness, constant worry or insomnia. Review of Systems   Constitutional: Positive for activity change and unexpected weight change. Negative for fatigue. Respiratory: Positive for chest tightness, shortness of breath and wheezing. Cardiovascular: Positive for palpitations. Negative for chest pain. Neurological: Negative for dizziness and light-headedness. Psychiatric/Behavioral: The patient is nervous/anxious. Prior to Visit Medications    Medication Sig Taking?  Authorizing Provider   Cholecalciferol (VITAMIN D3 PO) Take by mouth Yes Historical Provider, MD   omeprazole (PRILOSEC) 40 MG delayed release capsule Take 40 mg by mouth  Yes Historical Provider, MD   Pirfenidone (ESBRIET) 267 MG TABS Days 1 to 7: 267 mg 3 times daily (total dose: 801 mg/day) Days 8 to 14: 534 mg 3 times daily (total dose: 1,602 mg/day) Day 15 and thereafter: 801 mg 3 times daily (total dose: 2,403 mg/day)  Patient not taking: Reported on 2021  Kirsten Fermin MD        Social History Tobacco Use    Smoking status: Never Smoker    Smokeless tobacco: Never Used   Substance Use Topics    Alcohol use: Yes     Alcohol/week: 1.0 standard drinks     Types: 1 Cans of beer per week        Vitals:    06/09/21 1053   BP: 116/75   Site: Right Upper Arm   Position: Sitting   Cuff Size: Medium Adult   Pulse: 116   Weight: 146 lb (66.2 kg)   Height: 5' 8\" (1.727 m)     Estimated body mass index is 22.2 kg/m² as calculated from the following:    Height as of this encounter: 5' 8\" (1.727 m). Weight as of this encounter: 146 lb (66.2 kg). Physical Exam  Constitutional:       General: He is not in acute distress. Appearance: Normal appearance. He is normal weight. He is not ill-appearing. HENT:      Head: Normocephalic and atraumatic. Right Ear: Tympanic membrane normal.      Left Ear: Tympanic membrane normal.      Nose: Nose normal.      Mouth/Throat:      Mouth: Mucous membranes are moist.      Pharynx: Oropharynx is clear. Eyes:      Conjunctiva/sclera: Conjunctivae normal.   Cardiovascular:      Rate and Rhythm: Regular rhythm. Tachycardia present. Pulmonary:      Effort: Pulmonary effort is normal. No respiratory distress. Breath sounds: No wheezing or rales. Comments: Diminished breath sounds throughout  Abdominal:      General: Abdomen is flat. Bowel sounds are normal.      Palpations: Abdomen is soft. Musculoskeletal:         General: Deformity (Right lower extremity amputation) present. Comments: Lumbar spine is extremely rigid, range of motion is reduced in all directions, but most notably in flexion   Lymphadenopathy:      Cervical: No cervical adenopathy. Skin:     General: Skin is warm. Neurological:      Mental Status: He is alert. Mental status is at baseline.    Psychiatric:         Mood and Affect: Mood normal.         ASSESSMENT/PLAN:  1. Grief reaction: Long conversation with patient and son today about patient's emotions, how they are

## 2021-06-10 NOTE — TELEPHONE ENCOUNTER
I left message on Rubina's vm to call office back for information regarding pt's Bronch date/time and what he needs to do prior to procedure.

## 2021-06-11 ENCOUNTER — HOSPITAL ENCOUNTER (OUTPATIENT)
Dept: CARDIAC REHAB | Age: 66
Setting detail: THERAPIES SERIES
Discharge: HOME | End: 2021-06-11
Payer: MEDICARE

## 2021-06-11 ENCOUNTER — TELEPHONE (OUTPATIENT)
Dept: PULMONOLOGY | Age: 66
End: 2021-06-11

## 2021-06-11 PROCEDURE — G0239 OTH RESP PROC, GROUP: HCPCS

## 2021-06-11 NOTE — TELEPHONE ENCOUNTER
[facility] called to let us know that they can not offer a smaller tank for pt due to the liter flow he uses ( 4 lpm) the tank would not last long enough for him to travel. Spoke to Humble Alba & he is aware of above.

## 2021-06-14 ENCOUNTER — HOSPITAL ENCOUNTER (OUTPATIENT)
Dept: CARDIAC REHAB | Age: 66
Setting detail: THERAPIES SERIES
Discharge: HOME | End: 2021-06-14
Payer: MEDICARE

## 2021-06-14 PROCEDURE — G0239 OTH RESP PROC, GROUP: HCPCS

## 2021-06-14 ASSESSMENT — ENCOUNTER SYMPTOMS
CHEST TIGHTNESS: 0
SHORTNESS OF BREATH: 1
WHEEZING: 0
COUGH: 1

## 2021-06-14 NOTE — PROGRESS NOTES
[facility] Pulmonary and Critical Care    Outpatient Note    Subjective:   CHIEF COMPLAINT:   No chief complaint on file. Interval history on 6/9/21  Overall he is doing better since he was started on O2. Has not received Esbriet yet, apparently there was discrepancy in the last name on one form he filled on line. There is no increase in cough or sputum production. Interval history on 5/13/21  Continue to have SOLORZANO, with recurrent upper abdominal spasms. Appetite is poor. HPI:  5/4/21   The patient is 77 y.o. male who presents today for a new patient visit for shortness of breath and cough precipitated by minimal exertion or talking. He is known to have pulmonary fibrosis and is here today to establish care with [address] [facility]. His condition started 2-3 years ago after a visit to [facility]. It is progressively getting worse. He was treated with prednisone for over three months. Now he is also complaining of back pain. He is unable to sleep flat as this precipitate cough and mucus production. He is unable to drive due to muscle spasms. This is going on over the past 6-8 months. He was on ciprofloxacin for years for osteomyelitis after an accident. In 2000 he had amputation of RLL and since he is off ABX    He used to work in a [employer] years ago. He came to [address] [place] [address] 15 years ago and since he is working in [employer]. Does not recall exposure to chemicals or asbestos. There is no hx of inflammatory arthritis or rash, however he is slowly developing finger clubbing. He was seen by rheumatology and it was felt his ILD is not due to a rheumatologic condition.     Never smoker    Past Medical History:    Past Medical History:   Diagnosis Date    Amputation of leg below knee, right, traumatic, complicated, subsequent encounter        Social History:    Social History     Tobacco Use   Smoking Status Never Smoker   Smokeless Tobacco Never Used       Family History:  No family history on file.    Current Medications:  Current Outpatient Medications on File Prior to Visit   Medication Sig Dispense Refill    Cholecalciferol (VITAMIN D3 PO) Take by mouth      omeprazole (PRILOSEC) 40 MG delayed release capsule Take 40 mg by mouth       Pirfenidone (ESBRIET) 267 MG TABS Days 1 to 7: 267 mg 3 times daily (total dose: 801 mg/day) Days 8 to 14: 534 mg 3 times daily (total dose: 1,602 mg/day) Day 15 and thereafter: 801 mg 3 times daily (total dose: 2,403 mg/day) (Patient not taking: Reported on 5/17/2021) 207 tablet 3     Current Facility-Administered Medications on File Prior to Visit   Medication Dose Route Frequency Provider Last Rate Last Admin    oxyCODONE-acetaminophen (PERCOCET) 5-325 MG per tablet 1 tablet  1 tablet Oral Q8H PRN Pippa Martinez MD   1 tablet at 05/21/21 1409    benzonatate (TESSALON) capsule 100 mg  100 mg Oral TID PRN Armida Apley, MD   100 mg at 05/21/21 1618    guaiFENesin-codeine (GUAIFENESIN AC) 100-10 MG/5ML liquid 5 mL  5 mL Oral Q4H PRN Armida Apley, MD           REVIEW OF SYSTEMS:    Review of Systems   Constitutional: Negative for chills, fatigue and fever. HENT: Negative for congestion. Respiratory: Positive for cough and shortness of breath (on exertion ). Negative for chest tightness and wheezing. Cardiovascular: Negative for chest pain, palpitations and leg swelling. Neurological: Negative for weakness. Psychiatric/Behavioral: The patient is not nervous/anxious. All other systems reviewed and are negative. Objective:   PHYSICAL EXAM:        VITALS:  /72 (Site: Left Upper Arm, Position: Sitting, Cuff Size: Medium Adult)   Pulse 101   Temp 97 °F (36.1 °C) (Infrared)   Ht 5' 7\" (1.702 m)   Wt 146 lb (66.2 kg)   SpO2 100%   BMI 22.87 kg/m²     Physical Exam  Vitals reviewed. Constitutional:       Appearance: He is well-developed. HENT:      Head: Normocephalic and atraumatic.    Eyes:      Extraocular Movements: Extraocular movements intact. Pupils: Pupils are equal, round, and reactive to light. Neck:      Vascular: No JVD. Cardiovascular:      Rate and Rhythm: Normal rate and regular rhythm. Heart sounds: No murmur heard. Pulmonary:      Effort: Pulmonary effort is normal. No respiratory distress. Breath sounds: No stridor. Rales (bibasilar rales) present. No wheezing. Abdominal:      General: Bowel sounds are normal.      Palpations: Abdomen is soft. Musculoskeletal:         General: No deformity. Cervical back: Normal range of motion and neck supple. Left lower leg: No edema. Comments: Right leg amputation   Skin:     General: Skin is warm and dry. Neurological:      Mental Status: He is alert and oriented to person, place, and time. Psychiatric:         Behavior: Behavior normal.         DATA:    CXR on 4/7/21  PORTABLE AP CHEST AT 2206 HOURS:         HISTORY: Shortness of breath.       COMPARISON: None.          FINDINGS: The heart is normal in size. Inspiration is somewhat limited. There is a diffuse accentuation of pulmonary interstitial markings seen throughout both lungs. No focal alveolar infiltrates or effusions are seen.       Bony structures are unremarkable.            Impression       Diffuse accentuation of pulmonary interstitial markings throughout both lungs, which may be either acute or chronic. Interstitial pneumonitis, or possible underlying fibrosis can give this appearance. Limited inspiration.       Correlate clinically. CT chest on 9/8/20 (comparison with CT chest on 12/4/18  CT pattern indeterminate for UIP. Not significantly changed diffuse peripheral fibrosis with traction bronchiectasis, slightly more prominent in the mid and upper lung zones. fe ti would include chronic hypersensitivity pneumonitis, amongst other etiologies. Path on 5/21/21  ADDENDUM:  ... GMS stain shows no evidence of pneumocystis or fungal   forms. .............. [facility] Sally Wheat M.D.   (Electronic Signature)   06/09/2021       ADDENDUM:  ... PAS stain for fungus shows no definite fungal forms. Although organisms are not identified on special stains performed, the   possibility of an underlying infectious etiology cannot be   ruled-out. ............... Sally Wheat M.D.   (Electronic Signature)   06/07/2021       FINAL DIAGNOSIS:     Core biopsy, left lung lesion:      - Small foci of noncaseating granulomatous inflammation focally with        features of foreign body giant cell reaction with focal necrosis in        adjacent parenchyma.      - Remaining specimen with lymphoid infiltrate, favor chronic        nonspecific inflammation.      - No evidence of malignancy. Assessment:      Diagnosis Orders   1. IPF (idiopathic pulmonary fibrosis) (HCC)         Plan:   77year old male with ILD. Prior CT scan is indeterminate for UIP. No definite rheumatologic condition. CRP was low in 2018. He was on cipro for years for apparently chronic osteomyelitis, however he stopped it in 2000 after amputation of RLE. Now he walks with prosthesis and has reasonable functional capacity. CT scan on 4/7/21 compared to prior CT scan on 9/8/2020. It is consistent with progressive UIP   New RUY mass like density biopsied. It is negative for malignancy. There is non caseating granulomas with giant cells and negative for fungal elements. (Had PFT show severe restriction with TLC of 41. FEV1 is 58% predicted. DLCO is 33% predicted. Sed rate and CRP are mildly elevated at 31 and 8.2  Hypersensitivity panel is negative   NICKY with reflex is negative   NICKY is < 1:20  RF is < 10)      Plan   Awaiting approval for [facility]  Refer to [facility] for evaluation for lung transplant   Plan for bronch to r/o infectious etiology of this mass like density.     O2 supplement - prescribe portable concentrator

## 2021-06-16 ENCOUNTER — OFFICE VISIT (OUTPATIENT)
Dept: PRIMARY CARE CLINIC | Age: 66
End: 2021-06-16
Payer: MEDICARE

## 2021-06-16 ENCOUNTER — TELEPHONE (OUTPATIENT)
Dept: PULMONOLOGY | Age: 66
End: 2021-06-16

## 2021-06-16 ENCOUNTER — HOSPITAL ENCOUNTER (OUTPATIENT)
Dept: CARDIAC REHAB | Age: 66
Setting detail: THERAPIES SERIES
Discharge: HOME | End: 2021-06-16
Payer: MEDICARE

## 2021-06-16 VITALS
HEIGHT: 68 IN | WEIGHT: 145.6 LBS | BODY MASS INDEX: 22.07 KG/M2 | SYSTOLIC BLOOD PRESSURE: 117 MMHG | HEART RATE: 110 BPM | DIASTOLIC BLOOD PRESSURE: 76 MMHG

## 2021-06-16 DIAGNOSIS — F32.9 REACTIVE DEPRESSION: Primary | ICD-10-CM

## 2021-06-16 PROCEDURE — 1036F TOBACCO NON-USER: CPT | Performed by: STUDENT IN AN ORGANIZED HEALTH CARE EDUCATION/TRAINING PROGRAM

## 2021-06-16 PROCEDURE — 3017F COLORECTAL CA SCREEN DOC REV: CPT | Performed by: STUDENT IN AN ORGANIZED HEALTH CARE EDUCATION/TRAINING PROGRAM

## 2021-06-16 PROCEDURE — G0239 OTH RESP PROC, GROUP: HCPCS

## 2021-06-16 PROCEDURE — 99214 OFFICE O/P EST MOD 30 MIN: CPT | Performed by: STUDENT IN AN ORGANIZED HEALTH CARE EDUCATION/TRAINING PROGRAM

## 2021-06-16 PROCEDURE — G8427 DOCREV CUR MEDS BY ELIG CLIN: HCPCS | Performed by: STUDENT IN AN ORGANIZED HEALTH CARE EDUCATION/TRAINING PROGRAM

## 2021-06-16 PROCEDURE — 1111F DSCHRG MED/CURRENT MED MERGE: CPT | Performed by: STUDENT IN AN ORGANIZED HEALTH CARE EDUCATION/TRAINING PROGRAM

## 2021-06-16 PROCEDURE — G8420 CALC BMI NORM PARAMETERS: HCPCS | Performed by: STUDENT IN AN ORGANIZED HEALTH CARE EDUCATION/TRAINING PROGRAM

## 2021-06-16 PROCEDURE — 1123F ACP DISCUSS/DSCN MKR DOCD: CPT | Performed by: STUDENT IN AN ORGANIZED HEALTH CARE EDUCATION/TRAINING PROGRAM

## 2021-06-16 PROCEDURE — 4040F PNEUMOC VAC/ADMIN/RCVD: CPT | Performed by: STUDENT IN AN ORGANIZED HEALTH CARE EDUCATION/TRAINING PROGRAM

## 2021-06-16 RX ORDER — SERTRALINE HYDROCHLORIDE 25 MG/1
25 TABLET, FILM COATED ORAL DAILY
Qty: 30 TABLET | Refills: 5 | Status: SHIPPED | OUTPATIENT
Start: 2021-06-16 | End: 2021-09-10

## 2021-06-16 ASSESSMENT — ENCOUNTER SYMPTOMS
CHEST TIGHTNESS: 1
WHEEZING: 1
SHORTNESS OF BREATH: 1

## 2021-06-16 NOTE — PATIENT INSTRUCTIONS
Take part in a Jain activity or other social gathering. Go to a [employer] game. · Ask a friend to have dinner with you. Take care of yourself  · Eat a balanced diet with plenty of fresh fruits and vegetables, whole grains, and lean protein. If you have lost your appetite, eat small snacks rather than large meals. · Avoid using illegal drugs or marijuana and drinking alcohol. Do not take medicines that have not been prescribed for you. They may interfere with medicines you may be taking for depression, or they may make your depression worse. · Take your medicines exactly as they are prescribed. You may start to feel better within 1 to 3 weeks of taking antidepressant medicine. But it can take as many as 6 to 8 weeks to see more improvement. If you have questions or concerns about your medicines, or if you do not notice any improvement by 3 weeks, talk to your doctor. · Continue to take your medicine after your symptoms improve. Taking your medicine for at least 6 months after you feel better can help keep you from getting depressed again. If this isn't the first time you have been depressed, your doctor may recommend you to take medicine even longer. · If you have any side effects from your medicine, tell your doctor. Many side effects are mild and will go away on their own after you have been taking the medicine for a few weeks. Some may last longer. Talk to your doctor if side effects are bothering you too much. You might be able to try a different medicine. · Continue counseling. It may help prevent depression from returning, especially if you've had multiple episodes of depression. Talk with your counselor if you are having a hard time attending your sessions or you think the sessions aren't working. Don't just stop going. · Get enough sleep. Talk to your doctor if you are having problems sleeping. · Avoid sleeping pills unless they are prescribed by the doctor treating your depression.  Sleeping pills may always ask your healthcare professional. [place] [address] any warranty or liability for your use of this information.

## 2021-06-16 NOTE — TELEPHONE ENCOUNTER
Received call from [address] [place] [address] that they have the referral but have unsuccessfully been able to reach the pt. #'s were confirmed. They will try again.

## 2021-06-16 NOTE — PROGRESS NOTES
2021     Praveen Haro (:  1955) is a 77 y.o. male, here for evaluation of the following medical concerns:    HPI  Depression: Patient presents today for evaluation of depressed mood. He was seen last week for follow-up after a positive depression screen at the pulmonologist office. Long discussion with him and his son and ultimately he left without an antidepressant. He had a long conversation after a visit with his wife and son at home. Today he returns because after reflection he is decided he would like to go on an antidepressant. His main symptoms are depressed mood, constant worry, anxiousness, sadness. He denies suicidal ideation or intent. Most of this is related to his idiopathic pulmonary fibrosis in his worsening shortness of breath. Review of Systems   Constitutional: Positive for activity change and unexpected weight change. Negative for fatigue. Respiratory: Positive for chest tightness, shortness of breath and wheezing. Cardiovascular: Positive for palpitations. Negative for chest pain. Neurological: Negative for dizziness and light-headedness. Psychiatric/Behavioral: The patient is nervous/anxious. Prior to Visit Medications    Medication Sig Taking?  Authorizing Provider   sertraline (ZOLOFT) 25 MG tablet Take 1 tablet by mouth daily Yes Aj Ibarra DO   Cholecalciferol (VITAMIN D3 PO) Take by mouth Yes Historical Provider, MD   omeprazole (PRILOSEC) 40 MG delayed release capsule Take 40 mg by mouth  Yes Historical Provider, MD   Pirfenidone (ESBRIET) 267 MG TABS Days 1 to 7: 267 mg 3 times daily (total dose: 801 mg/day) Days 8 to 14: 534 mg 3 times daily (total dose: 1,602 mg/day) Day 15 and thereafter: 801 mg 3 times daily (total dose: 2,403 mg/day)  Patient not taking: Reported on 2021  Kirsten Fermin MD        Social History     Tobacco Use    Smoking status: Never Smoker    Smokeless tobacco: Never Used   Substance Use Topics    Alcohol use: Yes     Alcohol/week: 1.0 standard drinks     Types: 1 Cans of beer per week        Vitals:    06/16/21 1105   BP: 117/76   Site: Right Upper Arm   Position: Sitting   Cuff Size: Medium Adult   Pulse: 110   Weight: 145 lb 9.6 oz (66 kg)   Height: 5' 8\" (1.727 m)     Estimated body mass index is 22.14 kg/m² as calculated from the following:    Height as of this encounter: 5' 8\" (1.727 m). Weight as of this encounter: 145 lb 9.6 oz (66 kg). Physical Exam  Constitutional:       General: He is not in acute distress. Appearance: Normal appearance. He is normal weight. He is not ill-appearing. HENT:      Head: Normocephalic and atraumatic. Right Ear: Tympanic membrane normal.      Left Ear: Tympanic membrane normal.      Nose: Nose normal.      Mouth/Throat:      Mouth: Mucous membranes are moist.      Pharynx: Oropharynx is clear. Eyes:      Conjunctiva/sclera: Conjunctivae normal.   Cardiovascular:      Rate and Rhythm: Regular rhythm. Tachycardia present. Pulmonary:      Effort: Pulmonary effort is normal. No respiratory distress. Breath sounds: No wheezing or rales. Comments: Diminished breath sounds throughout  Abdominal:      General: Abdomen is flat. Bowel sounds are normal.      Palpations: Abdomen is soft. Musculoskeletal:         General: Deformity (Right lower extremity amputation) present. Comments: Lumbar spine is extremely rigid, range of motion is reduced in all directions, but most notably in flexion   Lymphadenopathy:      Cervical: No cervical adenopathy. Skin:     General: Skin is warm. Neurological:      Mental Status: He is alert. Mental status is at baseline. Psychiatric:         Mood and Affect: Mood normal.       ASSESSMENT/PLAN:  1. Reactive depression PHQ-9 elevated. Long discussion with patient at his most recent visit. See note. Will start Zoloft today and follow-up in 4 to 6 weeks. - sertraline (ZOLOFT) 25 MG tablet;  Take 1 tablet by mouth daily  Dispense: 30 tablet; Refill: 5      Return in about 6 weeks (around 7/28/2021). An electronic signature was used to authenticate this note.     --Janine Pool DO on 6/16/2021 at 12:19 PM

## 2021-06-18 ENCOUNTER — HOSPITAL ENCOUNTER (OUTPATIENT)
Dept: CARDIAC REHAB | Age: 66
Setting detail: THERAPIES SERIES
Discharge: HOME | End: 2021-06-18
Payer: MEDICARE

## 2021-06-18 PROCEDURE — G0239 OTH RESP PROC, GROUP: HCPCS

## 2021-06-21 ENCOUNTER — HOSPITAL ENCOUNTER (OUTPATIENT)
Dept: CARDIAC REHAB | Age: 66
Setting detail: THERAPIES SERIES
Discharge: HOME | End: 2021-06-21
Payer: MEDICARE

## 2021-06-21 PROCEDURE — G0239 OTH RESP PROC, GROUP: HCPCS

## 2021-06-22 DIAGNOSIS — R68.2 DRY MOUTH: Primary | ICD-10-CM

## 2021-06-22 RX ORDER — SALIVA SUBSTITUTE COMB NO.11 351 MG
1 POWDER IN PACKET (EA) MUCOUS MEMBRANE 3 TIMES DAILY
Qty: 120 EACH | Refills: 1 | Status: SHIPPED | OUTPATIENT
Start: 2021-06-22 | End: 2021-07-19 | Stop reason: SDUPTHER

## 2021-06-23 ENCOUNTER — HOSPITAL ENCOUNTER (OUTPATIENT)
Age: 66
Setting detail: OUTPATIENT SURGERY
Discharge: HOME | End: 2021-06-23
Attending: INTERNAL MEDICINE | Admitting: INTERNAL MEDICINE
Payer: MEDICARE

## 2021-06-23 ENCOUNTER — APPOINTMENT (OUTPATIENT)
Dept: GENERAL RADIOLOGY | Age: 66
End: 2021-06-23
Attending: INTERNAL MEDICINE
Payer: MEDICARE

## 2021-06-23 VITALS
HEART RATE: 94 BPM | HEIGHT: 67 IN | WEIGHT: 145 LBS | DIASTOLIC BLOOD PRESSURE: 64 MMHG | SYSTOLIC BLOOD PRESSURE: 113 MMHG | BODY MASS INDEX: 22.76 KG/M2 | TEMPERATURE: 97.3 F | OXYGEN SATURATION: 100 % | RESPIRATION RATE: 18 BRPM

## 2021-06-23 LAB
APPEARANCE BAL (LAVAGE): ABNORMAL
CLOT EVALUATION BAL: ABNORMAL
COLOR LAVAGE: COLORLESS
LYMPHOCYTES, BAL: 20 % (ref 5–10)
MACROPHAGES, BAL: 63 % (ref 90–95)
NUMBER OF CELLS COUNTED BAL (LAVAGE): 100
RBC, BAL: <1000 /CUMM
SEGMENTED NEUTROPHILS, BAL: 17 % (ref 5–10)
WBC/EPI CELLS BAL: 754 /CUMM

## 2021-06-23 PROCEDURE — 87081 CULTURE SCREEN ONLY: CPT

## 2021-06-23 PROCEDURE — 71045 X-RAY EXAM CHEST 1 VIEW: CPT

## 2021-06-23 PROCEDURE — 87633 RESP VIRUS 12-25 TARGETS: CPT

## 2021-06-23 PROCEDURE — 87015 SPECIMEN INFECT AGNT CONCNTJ: CPT

## 2021-06-23 PROCEDURE — 7100000000 HC PACU RECOVERY - FIRST 15 MIN: Performed by: INTERNAL MEDICINE

## 2021-06-23 PROCEDURE — 89051 BODY FLUID CELL COUNT: CPT

## 2021-06-23 PROCEDURE — 99152 MOD SED SAME PHYS/QHP 5/>YRS: CPT | Performed by: INTERNAL MEDICINE

## 2021-06-23 PROCEDURE — 87102 FUNGUS ISOLATION CULTURE: CPT

## 2021-06-23 PROCEDURE — 87070 CULTURE OTHR SPECIMN AEROBIC: CPT

## 2021-06-23 PROCEDURE — 88305 TISSUE EXAM BY PATHOLOGIST: CPT

## 2021-06-23 PROCEDURE — 7100000011 HC PHASE II RECOVERY - ADDTL 15 MIN: Performed by: INTERNAL MEDICINE

## 2021-06-23 PROCEDURE — 87116 MYCOBACTERIA CULTURE: CPT

## 2021-06-23 PROCEDURE — 6360000002 HC RX W HCPCS: Performed by: INTERNAL MEDICINE

## 2021-06-23 PROCEDURE — 31624 DX BRONCHOSCOPE/LAVAGE: CPT | Performed by: INTERNAL MEDICINE

## 2021-06-23 PROCEDURE — 2580000003 HC RX 258: Performed by: INTERNAL MEDICINE

## 2021-06-23 PROCEDURE — 2709999900 HC NON-CHARGEABLE SUPPLY: Performed by: INTERNAL MEDICINE

## 2021-06-23 PROCEDURE — 87205 SMEAR GRAM STAIN: CPT

## 2021-06-23 PROCEDURE — 87305 ASPERGILLUS AG IA: CPT

## 2021-06-23 PROCEDURE — 3609011100 HC BRONCHOSCOPY BRUSHINGS: Performed by: INTERNAL MEDICINE

## 2021-06-23 PROCEDURE — 88112 CYTOPATH CELL ENHANCE TECH: CPT

## 2021-06-23 PROCEDURE — 3609010800 HC BRONCHOSCOPY ALVEOLAR LAVAGE: Performed by: INTERNAL MEDICINE

## 2021-06-23 PROCEDURE — 7100000001 HC PACU RECOVERY - ADDTL 15 MIN: Performed by: INTERNAL MEDICINE

## 2021-06-23 PROCEDURE — 2500000003 HC RX 250 WO HCPCS: Performed by: INTERNAL MEDICINE

## 2021-06-23 PROCEDURE — 87206 SMEAR FLUORESCENT/ACID STAI: CPT

## 2021-06-23 PROCEDURE — 7100000010 HC PHASE II RECOVERY - FIRST 15 MIN: Performed by: INTERNAL MEDICINE

## 2021-06-23 PROCEDURE — 31623 DX BRONCHOSCOPE/BRUSH: CPT | Performed by: INTERNAL MEDICINE

## 2021-06-23 RX ORDER — LIDOCAINE HYDROCHLORIDE 20 MG/ML
5 INJECTION, SOLUTION EPIDURAL; INFILTRATION; INTRACAUDAL; PERINEURAL ONCE
Status: COMPLETED | OUTPATIENT
Start: 2021-06-23 | End: 2021-06-23

## 2021-06-23 RX ORDER — FENTANYL CITRATE 50 UG/ML
INJECTION, SOLUTION INTRAMUSCULAR; INTRAVENOUS PRN
Status: DISCONTINUED | OUTPATIENT
Start: 2021-06-23 | End: 2021-06-23 | Stop reason: ALTCHOICE

## 2021-06-23 RX ORDER — LIDOCAINE HYDROCHLORIDE 20 MG/ML
INJECTION, SOLUTION EPIDURAL; INFILTRATION; INTRACAUDAL; PERINEURAL PRN
Status: DISCONTINUED | OUTPATIENT
Start: 2021-06-23 | End: 2021-06-23 | Stop reason: ALTCHOICE

## 2021-06-23 RX ORDER — MIDAZOLAM HYDROCHLORIDE 1 MG/ML
INJECTION INTRAMUSCULAR; INTRAVENOUS PRN
Status: DISCONTINUED | OUTPATIENT
Start: 2021-06-23 | End: 2021-06-23 | Stop reason: ALTCHOICE

## 2021-06-23 RX ORDER — SODIUM CHLORIDE 9 MG/ML
INJECTION, SOLUTION INTRAVENOUS CONTINUOUS
Status: DISCONTINUED | OUTPATIENT
Start: 2021-06-23 | End: 2021-06-23 | Stop reason: HOSPADM

## 2021-06-23 RX ADMIN — LIDOCAINE HYDROCHLORIDE 5 ML: 20 INJECTION, SOLUTION EPIDURAL; INFILTRATION; INTRACAUDAL; PERINEURAL at 08:25

## 2021-06-23 RX ADMIN — SODIUM CHLORIDE: 9 INJECTION, SOLUTION INTRAVENOUS at 08:19

## 2021-06-23 ASSESSMENT — ENCOUNTER SYMPTOMS
WHEEZING: 0
SHORTNESS OF BREATH: 1

## 2021-06-23 ASSESSMENT — PAIN - FUNCTIONAL ASSESSMENT
PAIN_FUNCTIONAL_ASSESSMENT: 0-10
PAIN_FUNCTIONAL_ASSESSMENT: FACES
PAIN_FUNCTIONAL_ASSESSMENT: 0-10
PAIN_FUNCTIONAL_ASSESSMENT: FACES

## 2021-06-23 ASSESSMENT — PAIN SCALES - GENERAL
PAINLEVEL_OUTOF10: 0

## 2021-06-23 NOTE — PROGRESS NOTES
PACU Transfer to [facility]    Vitals:    06/23/21 1030   BP: 96/67   Pulse: 90   Resp: 27   Temp: 97.8   SpO2: 99%         Intake/Output Summary (Last 24 hours) at 6/23/2021 1048  Last data filed at 6/23/2021 1030  Gross per 24 hour   Intake 185 ml   Output 0 ml   Net 185 ml       Pain assessment:  none  Pain Level: 0    Patient transferred to care of [facility] RN. Transferred by stretcher per pacu transport.     6/23/2021 10:48 AM

## 2021-06-23 NOTE — H&P
H&P    Patient's PCP: Tyree Ceja DO    HISTORY OF PRESENT ILLNESS:    This is a  77 y.o. male with PMH of IPF and RUY density. He is here for bronchoscopy  Please refer to my office note for details. Past Medical / Surgical History:    Past Medical History:   Diagnosis Date    Amputation of leg below knee, right, traumatic, complicated, subsequent encounter     Pulmonary fibrosis ([address]      Past Surgical History:   Procedure Laterality Date    CT GUIDED CHEST TUBE  5/21/2021    CT GUIDED CHEST TUBE 5/21/2021 [facility] CT SCAN    CT NEEDLE BIOPSY LUNG PERCUTANEOUS  5/21/2021    CT NEEDLE BIOPSY LUNG PERCUTANEOUS 5/21/2021 [facility] CT SCAN       Medications Prior to Admission:    Current Facility-Administered Medications on File Prior to Encounter   Medication Dose Route Frequency Provider Last Rate Last Admin    oxyCODONE-acetaminophen (PERCOCET) 5-325 MG per tablet 1 tablet  1 tablet Oral Q8H PRN Reed Thomason MD   1 tablet at 05/21/21 1409    benzonatate (TESSALON) capsule 100 mg  100 mg Oral TID PRN Vanessa Horowitz MD   100 mg at 05/21/21 1618    guaiFENesin-codeine (GUAIFENESIN AC) 100-10 MG/5ML liquid 5 mL  5 mL Oral Q4H PRN Vanessa Horowitz MD         Current Outpatient Medications on File Prior to Encounter   Medication Sig Dispense Refill    BIOTIN PO Take by mouth daily      Cholecalciferol (VITAMIN D3 PO) Take by mouth      omeprazole (PRILOSEC) 40 MG delayed release capsule Take 40 mg by mouth       Pirfenidone (ESBRIET) 267 MG TABS Days 1 to 7: 267 mg 3 times daily (total dose: 801 mg/day) Days 8 to 14: 534 mg 3 times daily (total dose: 1,602 mg/day) Day 15 and thereafter: 801 mg 3 times daily (total dose: 2,403 mg/day) (Patient not taking: Reported on 5/17/2021) 207 tablet 3       Allergies:  Patient has no known allergies. Social History:   TOBACCO:   reports that he has never smoked.  He has never used smokeless tobacco.     ETOH:   reports current alcohol use of about 1.0 standard drinks of alcohol per week. Family History:   History reviewed. No pertinent family history. Review of Systems   Respiratory: Positive for shortness of breath (on exertion). Negative for wheezing. Cardiovascular: Negative for chest pain and leg swelling. PHYSICAL EXAM:  /73   Pulse 95   Temp 96.7 °F (35.9 °C) (Temporal)   Resp 18   Ht 5' 7\" (1.702 m)   Wt 145 lb (65.8 kg)   SpO2 100%   BMI 22.71 kg/m²     Physical Exam  Cardiovascular:      Rate and Rhythm: Normal rate and regular rhythm. Pulmonary:      Effort: Pulmonary effort is normal.      Breath sounds: Rales present. Abdominal:      General: Abdomen is flat. Palpations: Abdomen is soft. LABS:  No results for input(s): WBC, HGB, HCT, PLT in the last 72 hours. No results for input(s): NA, K, CL, CO2, BUN, CREATININE, GLUCOSE in the last 72 hours. Invalid input(s):  CA,  PHOS  No results for input(s): AST, ALT, ALB, BILITOT, ALKPHOS in the last 72 hours. No results for input(s): TROPONINI in the last 72 hours. No results for input(s): BNP in the last 72 hours. No results found for: PHART, ZHR1XRQ, PO2ART  No results for input(s): INR in the last 72 hours. @LABRCNT(NITRITE,COLORU,PHUR,LABCAST,WBCUA,RBCUA,MUCUS,TRICHOMONAS,YEAST,BACTERIA,CLARITYU,SPECGRAV,LEUKOCYTESUR,UROBILINOGEN,BILIRUBINUR,BLOODU,GLUCOSEU,KETONESU,AMORPHOUS)@       Assessment &Plan:    Patient Active Problem List:     IPF (idiopathic pulmonary fibrosis) (HCC)     Chronic bilateral low back pain without sciatica     Chronic respiratory failure with hypoxia (HCC)     Pneumothorax after biopsy     Moderate malnutrition ([facility]      Plan for bronch today to the RUY apical segment with BAL and brushing     The patient and / or the family were informed of the results of any tests, a time was given to answer questions, a plan was proposed and they agreed with plan.     Thank you

## 2021-06-23 NOTE — PROGRESS NOTES
Patient received from the Endoscopy Dept to PACU #9 post Sublette Haydee of Dr. Rabia Bourgeois. Placed on PACU monitoring equipment. Report given per endo RN, Pearl Covington. Per report, patient was stable during the procedure. BP ran in the 39'T systolic. On arrival, patient has frequent NPC. Denies pain.

## 2021-06-23 NOTE — PROCEDURES
PROCEDURE: Fiberoptic bronchoscopy with BAL and brush    Preprocedure diagnosis: RUY density with IPF  Post procedure diagnosis: same      DESCRIPTION OF PROCEDURE: Informed consent was obtained from the patient after explaining the risks and benefits. A time out was taken. Mallampati II  ASA III  Anesthesia:       Type of sedation used: Moderate Sedation  Medications: Fentanyl 75 mcg, Versed 4 mg  Physician/patient face-to-face sedation start time: 0857  Physician/patient face-to-face sedation stop time: 0911  Total moderate sedation time in minutes: 14 minutes  Patient was monitored continuously 1:1 throughout the entire procedure while sedation was administered    Bronchoscope was inserted into the main airway via the mouth. Vocal cords were normal looking and mobile. Lidocaine was used topically to anesthetize the vocal cords and main airways. The bronchial trees were examined to the subsegmental level. There was no masses or bleeding. There was mild amount of thick secretions in the RUY bronchus that was suctioned with ease. BAL was obtained from RUY, followed by brush to RUY, apical segment. Sent for appropriate testing. The patient tolerated the procedure well.   No immediate complication    EBL: minimal       CXR ordered

## 2021-06-24 ENCOUNTER — TELEPHONE (OUTPATIENT)
Dept: PULMONOLOGY | Age: 66
End: 2021-06-24

## 2021-06-24 DIAGNOSIS — R89.9 ACID-FAST BACTERIA PRESENT: Primary | ICD-10-CM

## 2021-06-24 NOTE — TELEPHONE ENCOUNTER
Stephen rich from microbiology called to give positive result     + Acid Fast Daccilli - direct smear BAL
No

## 2021-06-24 NOTE — TELEPHONE ENCOUNTER
----- Message from Magda Amaya sent at 6/23/2021  4:36 PM EDT -----  Subject: Message to Provider    QUESTIONS  Information for Provider? [facility] at [address],   [address] [phone]. They cannot fill the prescription for   patient-Saliviamax 120 MG. It is unavailable from the supplier. So a new   medication will need to be sent to pharmacy.  ---------------------------------------------------------------------------  --------------  [address]  What is the best way for the office to contact you? Do not leave any   message, patient will call back for answer  Preferred Call Back Phone Number? [phone]  ---------------------------------------------------------------------------  --------------  SCRIPT ANSWERS  Relationship to Patient? Third Party  Representative Name?  [id number]

## 2021-06-24 NOTE — TELEPHONE ENCOUNTER
Can we call her pharmacy and see what similar medicine they have? He already tried biotene with no response.

## 2021-06-25 ENCOUNTER — TELEPHONE (OUTPATIENT)
Dept: INFECTIOUS DISEASES | Age: 66
End: 2021-06-25

## 2021-06-25 DIAGNOSIS — R89.9 ACID-FAST BACTERIA PRESENT: ICD-10-CM

## 2021-06-25 LAB
ASPERGILLUS GALACTO AG: NEGATIVE
ASPERGILLUS GALACTO INDEX: 0.07
CULTURE, RESPIRATORY: NORMAL
GRAM STAIN RESULT: NORMAL

## 2021-06-25 NOTE — TELEPHONE ENCOUNTER
clerk Edson at [facility] called to let Dr. Josh Mo know that spacemen for AFB leaked and they will collect and send again on coming Monday

## 2021-06-25 NOTE — TELEPHONE ENCOUNTER
Pt with SOB, cough, worsening over several months    5/7 Chest CT - 'severe bilateral UIP pattern pulmonary fibrosis increased in severity since September 2020'. .. 'focal area of nodular airspace consolidation with air bronchogram an mild surrounding groundglass opacity'    5/21 IR perc bx RUY, post procedure PTX, had chest catheter placed / removed, discharged 5/23  Path - Small foci of noncaseating granulomatous inflammation focally with        features of foreign body giant cell reaction with focal necrosis in        adjacent parenchyma. AFB / GMS stain neg. No cult sent    6/23 Bronch / BAL - AFB 1+, AFB cult in process  resp PCR, BAL galactomannan, fungal cult in process. Resp cult - normal yumiko    IMP/  LRT sample (from BAL)  1+ AFB, r/o TB    Plan/  Home isolation   Resp specimen for direct PCR for [facility]TB - [facility]  Notified [facility] regarding case Diana Ano Ringer [phone]  No rx at this time  Quantiferon TB ordered / sent     Discussed with pt's nephew - Shelia Toussaint    - pt has cough, SOB, wt loss. No fever / noc sweat / hemoptysis   - lives with him, his niece - Richysheryl Hurst, wife.   No one ill.   - understands situation (Dr Larry Graves had called earlier today)  Discussed with Vimal Albrecht, [facility]

## 2021-06-29 LAB
QUANTI TB GOLD PLUS: NEGATIVE
QUANTI TB1 MINUS NIL: 0.06 IU/ML (ref 0–0.34)
QUANTI TB2 MINUS NIL: 0.06 IU/ML (ref 0–0.34)
QUANTIFERON MITOGEN: 2.58 IU/ML
QUANTIFERON NIL: 0.08 IU/ML

## 2021-06-30 ENCOUNTER — TELEPHONE (OUTPATIENT)
Dept: INFECTIOUS DISEASES | Age: 66
End: 2021-06-30

## 2021-06-30 NOTE — TELEPHONE ENCOUNTER
Called by Claudeen Saddler DPH -   BAL direct mTB probe positive     Pt will be contacted by TB DPH  I have ask [facility] to eval and treat    [facility] infection control and left message, will try further to contact   [facility] - will get result and put into Epic  Will forward message to Dr Preethi Mccarthy

## 2021-07-02 LAB
FINAL REPORT: NORMAL
PRELIMINARY: NORMAL

## 2021-07-07 ENCOUNTER — TELEPHONE (OUTPATIENT)
Dept: PRIMARY CARE CLINIC | Age: 66
End: 2021-07-07

## 2021-07-07 ENCOUNTER — TELEPHONE (OUTPATIENT)
Dept: PULMONOLOGY | Age: 66
End: 2021-07-07

## 2021-07-07 DIAGNOSIS — R11.0 NAUSEA: Primary | ICD-10-CM

## 2021-07-07 RX ORDER — FAMOTIDINE 20 MG/1
20 TABLET, FILM COATED ORAL 2 TIMES DAILY
Qty: 60 TABLET | Refills: 3 | Status: SHIPPED | OUTPATIENT
Start: 2021-07-07 | End: 2021-09-10

## 2021-07-07 RX ORDER — ONDANSETRON 4 MG/1
4 TABLET, FILM COATED ORAL DAILY PRN
Qty: 30 TABLET | Refills: 0 | Status: SHIPPED | OUTPATIENT
Start: 2021-07-07 | End: 2021-09-10

## 2021-07-07 NOTE — TELEPHONE ENCOUNTER
I am sending a medicine for nausea as well as acid reflux into his pharmacy. Zofran is a nausea medicine. He can take this up to every 4 hours as needed for nausea. He should take the famotidine every evening.

## 2021-07-07 NOTE — TELEPHONE ENCOUNTER
I spoke wit pt son. Pt has Tuberculosis. Pt has been given the following medications from [facility]. Byrazinamide 500 mg Sig-1 tablet 3 times daily  Isoniazid 300 mg- Sig 1qd  Rifampin 600 mg- sig 1qd  Vitamin B6 50 mg- Sig 1qd     Pt has no appetite, He cant keep anything down when he tries to eat he throws it right back up, Acid Reflux and lots of Nauseous. He was diagnosed 1 week ago.

## 2021-07-07 NOTE — TELEPHONE ENCOUNTER
----- Message from Edilia Nam sent at 7/7/2021  1:53 PM EDT -----  Subject: Message to Provider    QUESTIONS  Information for Provider? Patients throat hurts when he eats acid reflux   feels nauseous when he eats due his medication that he just started   taking. Please call patient as soon as possible. Please call [phone]   [facility].  ---------------------------------------------------------------------------  --------------  CALL BACK INFO  What is the best way for the office to contact you? OK to leave message on   voicemail  Preferred Call Back Phone Number? [phone]  ---------------------------------------------------------------------------  --------------  SCRIPT ANSWERS  Relationship to Patient? Other  Representative Name? Boyd ace  Is the Representative on the appropriate HIPAA document in Epic?  Yes

## 2021-07-07 NOTE — TELEPHONE ENCOUNTER
Baldev Betancourt left voice message on office phone asking that you call him in regards to TB medication.   He can be reached at [phone]

## 2021-07-08 LAB
Lab: NORMAL
REPORT: NORMAL
THIS TEST SENT TO: NORMAL

## 2021-07-11 ENCOUNTER — APPOINTMENT (OUTPATIENT)
Dept: CT IMAGING | Age: 66
DRG: 442 | End: 2021-07-11
Payer: MEDICARE

## 2021-07-11 ENCOUNTER — HOSPITAL ENCOUNTER (EMERGENCY)
Age: 66
Discharge: HOME | DRG: 442 | End: 2021-07-11
Attending: EMERGENCY MEDICINE
Payer: MEDICARE

## 2021-07-11 VITALS
RESPIRATION RATE: 16 BRPM | WEIGHT: 135 LBS | TEMPERATURE: 97.7 F | BODY MASS INDEX: 21.19 KG/M2 | DIASTOLIC BLOOD PRESSURE: 75 MMHG | HEART RATE: 94 BPM | HEIGHT: 67 IN | SYSTOLIC BLOOD PRESSURE: 129 MMHG | OXYGEN SATURATION: 100 %

## 2021-07-11 DIAGNOSIS — R63.0 ANOREXIA: ICD-10-CM

## 2021-07-11 DIAGNOSIS — R10.13 ABDOMINAL PAIN, EPIGASTRIC: ICD-10-CM

## 2021-07-11 DIAGNOSIS — A15.0 TB (PULMONARY TUBERCULOSIS): Primary | ICD-10-CM

## 2021-07-11 DIAGNOSIS — R79.89 ABNORMAL LFTS: ICD-10-CM

## 2021-07-11 LAB
A/G RATIO: 0.4 (ref 1.1–2.2)
ALBUMIN SERPL-MCNC: 2.6 G/DL (ref 3.4–5)
ALP BLD-CCNC: 288 U/L (ref 40–129)
ALT SERPL-CCNC: 136 U/L (ref 10–40)
ANION GAP SERPL CALCULATED.3IONS-SCNC: 10 MMOL/L (ref 3–16)
AST SERPL-CCNC: 190 U/L (ref 15–37)
BASOPHILS ABSOLUTE: 0 K/UL (ref 0–0.2)
BASOPHILS RELATIVE PERCENT: 0 %
BILIRUB SERPL-MCNC: 4.6 MG/DL (ref 0–1)
BUN BLDV-MCNC: 12 MG/DL (ref 7–20)
CALCIUM SERPL-MCNC: 8.2 MG/DL (ref 8.3–10.6)
CHLORIDE BLD-SCNC: 88 MMOL/L (ref 99–110)
CO2: 25 MMOL/L (ref 21–32)
CREAT SERPL-MCNC: 0.6 MG/DL (ref 0.8–1.3)
EOSINOPHILS ABSOLUTE: 0 K/UL (ref 0–0.6)
EOSINOPHILS RELATIVE PERCENT: 0 %
GFR AFRICAN AMERICAN: >60
GFR NON-AFRICAN AMERICAN: >60
GLOBULIN: 6.1 G/DL
GLUCOSE BLD-MCNC: 101 MG/DL (ref 70–99)
HCT VFR BLD CALC: 32.8 % (ref 40.5–52.5)
HEMATOLOGY PATH CONSULT: YES
HEMOGLOBIN: 10.8 G/DL (ref 13.5–17.5)
LACTIC ACID: 2.1 MMOL/L (ref 0.4–2)
LIPASE: 27 U/L (ref 13–60)
LYMPHOCYTES ABSOLUTE: 0.6 K/UL (ref 1–5.1)
LYMPHOCYTES RELATIVE PERCENT: 5 %
MCH RBC QN AUTO: 31.3 PG (ref 26–34)
MCHC RBC AUTO-ENTMCNC: 32.9 G/DL (ref 31–36)
MCV RBC AUTO: 95.1 FL (ref 80–100)
MONOCYTES ABSOLUTE: 1.7 K/UL (ref 0–1.3)
MONOCYTES RELATIVE PERCENT: 14 %
NEUTROPHILS ABSOLUTE: 9.6 K/UL (ref 1.7–7.7)
NEUTROPHILS RELATIVE PERCENT: 81 %
PDW BLD-RTO: 16.3 % (ref 12.4–15.4)
PLATELET # BLD: 520 K/UL (ref 135–450)
PLATELET SLIDE REVIEW: ABNORMAL
PMV BLD AUTO: 6.9 FL (ref 5–10.5)
POTASSIUM REFLEX MAGNESIUM: 4.3 MMOL/L (ref 3.5–5.1)
RBC # BLD: 3.45 M/UL (ref 4.2–5.9)
RBC # BLD: NORMAL 10*6/UL
SLIDE REVIEW: ABNORMAL
SODIUM BLD-SCNC: 123 MMOL/L (ref 136–145)
TOTAL PROTEIN: 8.7 G/DL (ref 6.4–8.2)
WBC # BLD: 11.8 K/UL (ref 4–11)

## 2021-07-11 PROCEDURE — 74176 CT ABD & PELVIS W/O CONTRAST: CPT

## 2021-07-11 PROCEDURE — 80053 COMPREHEN METABOLIC PANEL: CPT

## 2021-07-11 PROCEDURE — 99283 EMERGENCY DEPT VISIT LOW MDM: CPT

## 2021-07-11 PROCEDURE — 85025 COMPLETE CBC W/AUTO DIFF WBC: CPT

## 2021-07-11 PROCEDURE — 83605 ASSAY OF LACTIC ACID: CPT

## 2021-07-11 PROCEDURE — 83690 ASSAY OF LIPASE: CPT

## 2021-07-11 PROCEDURE — 2580000003 HC RX 258: Performed by: EMERGENCY MEDICINE

## 2021-07-11 RX ORDER — 0.9 % SODIUM CHLORIDE 0.9 %
1000 INTRAVENOUS SOLUTION INTRAVENOUS ONCE
Status: COMPLETED | OUTPATIENT
Start: 2021-07-11 | End: 2021-07-11

## 2021-07-11 RX ADMIN — SODIUM CHLORIDE 1000 ML: 9 INJECTION, SOLUTION INTRAVENOUS at 19:37

## 2021-07-12 LAB — HEMATOLOGY PATH CONSULT: NORMAL

## 2021-07-12 ASSESSMENT — ENCOUNTER SYMPTOMS
NAUSEA: 1
CONSTIPATION: 1
ABDOMINAL PAIN: 1
RHINORRHEA: 0
EYE ITCHING: 0
COUGH: 1
SHORTNESS OF BREATH: 0

## 2021-07-12 NOTE — ED PROVIDER NOTES
[address] [address] ENCOUNTER      Pt Name: Annie Rhoades  MRN: [id number]  [place] 1955  Date of evaluation: 7/11/2021  Provider: Shyla Torres MD    CHIEF COMPLAINT       Chief Complaint   Patient presents with    Abdominal Pain     LUQ  NV     Anorexia    Insomnia     TB positive all s/s started after starting medication          HISTORY OF PRESENT ILLNESS   (Location/Symptom, Timing/Onset,Context/Setting, Quality, Duration, Modifying Factors, Severity)  Note limiting factors. Annie Rhoades is a 77 y.o. male who presents to the emergency department for malaise, abdominal pain, anorexia, nausea and vomiting. The patient was recently diagnosed with tuberculosis. He was started on several medications approximately 2 weeks ago. His nephew who helps give the medical history states that prior to the medications the patient had been doing well. The testing for TB had been done because of an abnormal chest x-ray. He has a history of pulmonary fibrosis. Since on the antibiotics the patient has had poor appetite. He has frequent vomiting. He has constipation left upper quadrant abdominal pain associated with coughing. The patient has been sustained mostly on a liquid diet including Ensure. Nursing notes were reviewed. REVIEW OF SYSTEMS    (2-9 systems for level 4, 10 or more for level 5)     Review of Systems   Constitutional: Positive for fatigue. Negative for fever. HENT: Negative for rhinorrhea. Eyes: Negative for itching. Respiratory: Positive for cough. Negative for shortness of breath. On oxygen at baseline via nasal cannula. He has not recently needed to increase his oxygen. Cardiovascular: Negative for chest pain. Gastrointestinal: Positive for abdominal pain, constipation and nausea. Endocrine: Negative for polyuria. Genitourinary: Negative for dysuria. Skin: Negative for rash.    Neurological: Negative for headaches. Hematological: Does not bruise/bleed easily. Psychiatric/Behavioral: Positive for sleep disturbance.          PAST MEDICAL HISTORY     Past Medical History:   Diagnosis Date    Amputation of leg below knee, right, traumatic, complicated, subsequent encounter     MTB (Mycobacterium tuberculosis infection) 06/23/2021    Pulmonary fibrosis ([facility] [address]          SURGICALHISTORY       Past Surgical History:   Procedure Laterality Date    BRONCHOSCOPY N/A 6/23/2021    BRONCHOSCOPY BRONCHOALVEOLAR LAVAGE performed by Sara Leos MD at [address]  6/23/2021    BRONCHOSCOPY BRUSHINGS performed by Sara Leos MD at [address]  5/21/2021    CT GUIDED CHEST TUBE 5/21/2021 [address] SCAN    CT NEEDLE BIOPSY LUNG PERCUTANEOUS  5/21/2021    CT NEEDLE BIOPSY LUNG PERCUTANEOUS 5/21/2021 [address] SCAN         CURRENT MEDICATIONS       Discharge Medication List as of 7/11/2021  9:27 PM      CONTINUE these medications which have NOT CHANGED    Details   ondansetron (ZOFRAN) 4 MG tablet Take 1 tablet by mouth daily as needed for Nausea or Vomiting, Disp-30 tablet, R-0Normal      famotidine (PEPCID) 20 MG tablet Take 1 tablet by mouth 2 times daily, Disp-60 tablet, R-3Normal      BIOTIN PO Take by mouth dailyHistorical Med      Artificial Saliva (SALIVAMAX) PACK Take 1 Package by mouth 3 times daily, Disp-120 each, R-1Normal      sertraline (ZOLOFT) 25 MG tablet Take 1 tablet by mouth daily, Disp-30 tablet, R-5Normal      Pirfenidone (ESBRIET) 267 MG TABS Days 1 to 7: 267 mg 3 times daily (total dose: 801 mg/day) Days 8 to 14: 534 mg 3 times daily (total dose: 1,602 mg/day) Day 15 and thereafter: 801 mg 3 times daily (total dose: 2,403 mg/day), Disp-207 tablet, R-3Print      Cholecalciferol (VITAMIN D3 PO) Take by mouthHistorical Med      omeprazole (PRILOSEC) 40 MG delayed release capsule Take 40 mg by mouth Historical Med             ALLERGIES     Patient has no known allergies. FAMILY HISTORY     No family history on file. SOCIAL HISTORY       Social History     Socioeconomic History    Marital status:      Spouse name: Not on file    Number of children: Not on file    Years of education: Not on file    Highest education level: Not on file   Occupational History    Occupation: motel owner    Tobacco Use    Smoking status: Never Smoker    Smokeless tobacco: Never Used   Substance and Sexual Activity    Alcohol use: Yes     Alcohol/week: 1.0 standard drinks     Types: 1 Cans of beer per week    Drug use: No    Sexual activity: Yes     Partners: Female   Other Topics Concern    Not on file   Social History Narrative    Not on file     Social Determinants of Health     Financial Resource Strain: Low Risk     Difficulty of Paying Living Expenses: Not hard at all   Food Insecurity: No Food Insecurity    Worried About [address] [employer] in the Last Year: Never true    [address] [employer] in the Last Year: Never true   Transportation Needs:     Lack of Transportation (Medical):      Lack of Transportation (Non-Medical):    Physical Activity:     Days of Exercise per Week:     Minutes of Exercise per Session:    Stress:     Feeling of Stress :    Social Connections:     Frequency of Communication with Friends and Family:     Frequency of Social Gatherings with Friends and Family:     Attends [employer] Services:     Active Member of Clubs or Organizations:     Attends Club or Organization Meetings:     Marital Status:    Intimate Partner Violence:     Fear of Current or Ex-Partner:     Emotionally Abused:     Physically Abused:     Sexually Abused:        SCREENINGS             PHYSICAL EXAM    (up to 7 for level 4, 8 or more for level 5)     ED Triage Vitals [07/11/21 1839]   BP Temp Temp Source Pulse Resp SpO2 Height Weight   127/86 97.7 °F (36.5 °C) Oral 106 20 98 % 5' 7\" (1.702 m) 135 lb (61.2 kg)       Physical Exam  Vitals and nursing note reviewed. Constitutional:       Appearance: Normal appearance. He is well-developed. He is not ill-appearing. HENT:      Head: Normocephalic and atraumatic. Right Ear: External ear normal.      Left Ear: External ear normal.      Nose: Nose normal.   Eyes:      General: No scleral icterus. Right eye: No discharge. Left eye: No discharge. Cardiovascular:      Rate and Rhythm: Normal rate and regular rhythm. Heart sounds: Normal heart sounds. Pulmonary:      Effort: Pulmonary effort is normal. No respiratory distress. Breath sounds: Rhonchi present. No wheezing or rales. Abdominal:      General: Bowel sounds are normal. There is no distension. Palpations: Abdomen is soft. Tenderness: There is no abdominal tenderness. There is no guarding or rebound. Musculoskeletal:      Cervical back: Neck supple. Comments: Right below-knee amputation no edema on the left. Normal dorsalis pedis pulse on the left. Skin:     Coloration: Skin is not pale. Neurological:      Mental Status: He is alert. Psychiatric:         Mood and Affect: Mood normal.         Behavior: Behavior normal.           DIAGNOSTIC RESULTS     EKG: All EKG's are interpreted by the Emergency Department Physician who either signs or Co-signs this chart in the absence of a cardiologist.    12 lead EKG shows     RADIOLOGY:   Non-plain film images such as CT, Ultrasound and MRI are read by the radiologist. Plain radiographic images are visualized and preliminarily interpreted by the emergency physician with the below findings:        Interpretation per the Radiologist below, if available at the time of this note:    CT CHEST ABDOMEN PELVIS WO CONTRAST   Final Result      CHEST:      Evidence of diffuse pulmonary interstitial fibrosis. Consolidation with bronchiectasis and volume loss in the anteromedial left upper lobe.  Poorly defined density with associated cavitation in the left upper lobe and apex. Findings are consistent with patient's diagnosis of TB. Mild AP window, left hilar or mediastinal adenopathy. This may be reactive in this setting. No other acute findings in the chest.            ABDOMEN/PELVIS:      Nondistended gallbladder. No acute findings in the abdomen or pelvis.             ED BEDSIDE ULTRASOUND:   Performed by ED Physician - none    LABS:  Labs Reviewed   CBC WITH AUTO DIFFERENTIAL - Abnormal; Notable for the following components:       Result Value    WBC 11.8 (*)     RBC 3.45 (*)     Hemoglobin 10.8 (*)     Hematocrit 32.8 (*)     RDW 16.3 (*)     Platelets 976 (*)     Neutrophils Absolute 9.6 (*)     Lymphocytes Absolute 0.6 (*)     Monocytes Absolute 1.7 (*)     All other components within normal limits    Narrative:     Performed at:  [facility]   Phone [phone]   COMPREHENSIVE METABOLIC PANEL W/ REFLEX TO MG FOR LOW K - Abnormal; Notable for the following components:    Sodium 123 (*)     Chloride 88 (*)     Glucose 101 (*)     CREATININE 0.6 (*)     Calcium 8.2 (*)     Total Protein 8.7 (*)     Albumin 2.6 (*)     Albumin/Globulin Ratio 0.4 (*)     Total Bilirubin 4.6 (*)     Alkaline Phosphatase 288 (*)      (*)      (*)     All other components within normal limits    Narrative:     Performed at:  [facility]  [employer] [facility]  [place]   Phone [phone]   LACTIC ACID, PLASMA - Abnormal; Notable for the following components:    Lactic Acid 2.1 (*)     All other components within normal limits    Narrative:     Performed at:  [facility]  [employer]  [place]   Phone [phone]   LIPASE    Narrative:     Performed at:  [facility]  [employer]   Phone [phone] All other labs were within normal range or not returned as of this dictation. EMERGENCY DEPARTMENT COURSE and DIFFERENTIAL DIAGNOSIS/MDM:   Vitals:    Vitals:    07/11/21 1839 07/11/21 1936   BP: 127/86 129/75   Pulse: 106 94   Resp: 20 16   Temp: 97.7 °F (36.5 °C)    TempSrc: Oral    SpO2: 98% 100%   Weight: 135 lb (61.2 kg)    Height: 5' 7\" (1.702 m)        Adult male who comes in for multiple complaints which started after he started therapy for tuberculosis. The patient is on rifampin, isoniazid, pyrazinamide and ethambutol he is also on vitamin B6. He is currently being managed by the health department of [facility]. Laboratory studies are obtained. The patient is given a liter bolus of normal saline. Laboratory studies reveal hyponatremia hypochloremia the patient also has elevated LFTs. A CT scan is therefore ordered. The nephew states that the health department does blood work in a few days ago he was told that his liver function tests were abnormal however the patient does not have any of the lab values which I can compared to today's values. CT scan here today does not show any acute process specifically of the liver. I initially plan to admit the patient because of his abnormal labs however upon reevaluation the patient feels much better after the IV fluids and he prefers to go home. I encouraged him to contact his primary care provider and his pulmonologist in the morning and also the health department. If they have any concerns of the patient worsens they are welcome to come back to the emergency room. They expressed understanding and they are agreeable. CRITICAL CARE TIME   None      CONSULTS:  None    PROCEDURES:       Procedures    FINAL IMPRESSION      1. TB (pulmonary tuberculosis)    2. Abnormal LFTs    3. Anorexia    4.  Abdominal pain, epigastric          DISPOSITION/PLAN   DISPOSITION Decision To Discharge 07/11/2021 09:23:43 PM      PATIENT REFERREDTO:  Darshana Cifuentes DO  [address] [place] [address] [employer]   [address]  [phone]    In 1 day        Please contact your pulmonologist in 1 day for reevaluation and discussion of complications of TB meds          DISCHARGEMEDICATIONS:  Discharge Medication List as of 7/11/2021  9:27 PM             (Please note that portions of this note were completed with a voice recognition program.  Efforts were made to edit the dictations but occasionally words are mis-transcribed.)    Abhishek Ramos MD (electronically signed)  Attending Emergency Physician        Abhishek Ramos MD  07/12/21 2024

## 2021-07-13 ENCOUNTER — TELEPHONE (OUTPATIENT)
Dept: PULMONOLOGY | Age: 66
End: 2021-07-13

## 2021-07-13 ENCOUNTER — HOSPITAL ENCOUNTER (INPATIENT)
Age: 66
LOS: 3 days | Discharge: HOME | DRG: 442 | End: 2021-07-16
Attending: EMERGENCY MEDICINE | Admitting: INTERNAL MEDICINE
Payer: MEDICARE

## 2021-07-13 DIAGNOSIS — E87.1 HYPONATREMIA: Primary | ICD-10-CM

## 2021-07-13 DIAGNOSIS — R74.01 TRANSAMINITIS: ICD-10-CM

## 2021-07-13 PROBLEM — A15.0 PULMONARY TUBERCULOSIS: Status: ACTIVE | Noted: 2021-07-13

## 2021-07-13 LAB
ACANTHOCYTES: ABNORMAL
ALBUMIN SERPL-MCNC: 2.4 G/DL (ref 3.4–5)
ALP BLD-CCNC: 242 U/L (ref 40–129)
ALT SERPL-CCNC: 101 U/L (ref 10–40)
ANION GAP SERPL CALCULATED.3IONS-SCNC: 11 MMOL/L (ref 3–16)
AST SERPL-CCNC: 102 U/L (ref 15–37)
BASE EXCESS VENOUS: 3 MMOL/L (ref -2–3)
BASOPHILS ABSOLUTE: 0 K/UL (ref 0–0.2)
BASOPHILS RELATIVE PERCENT: 0 %
BILIRUB SERPL-MCNC: 6 MG/DL (ref 0–1)
BILIRUBIN DIRECT: 4.6 MG/DL (ref 0–0.3)
BILIRUBIN URINE: NEGATIVE
BILIRUBIN, INDIRECT: 1.4 MG/DL (ref 0–1)
BLOOD, URINE: NEGATIVE
BUN BLDV-MCNC: 11 MG/DL (ref 7–20)
CALCIUM SERPL-MCNC: 7.9 MG/DL (ref 8.3–10.6)
CARBOXYHEMOGLOBIN: 1.5 % (ref 0–1.5)
CHLORIDE BLD-SCNC: 88 MMOL/L (ref 99–110)
CLARITY: CLEAR
CO2: 23 MMOL/L (ref 21–32)
COLOR: YELLOW
CREAT SERPL-MCNC: 0.7 MG/DL (ref 0.8–1.3)
EKG ATRIAL RATE: 91 BPM
EKG DIAGNOSIS: NORMAL
EKG P AXIS: 14 DEGREES
EKG P-R INTERVAL: 174 MS
EKG Q-T INTERVAL: 380 MS
EKG QRS DURATION: 74 MS
EKG QTC CALCULATION (BAZETT): 467 MS
EKG R AXIS: 40 DEGREES
EKG T AXIS: 38 DEGREES
EKG VENTRICULAR RATE: 91 BPM
EOSINOPHILS ABSOLUTE: 0 K/UL (ref 0–0.6)
EOSINOPHILS RELATIVE PERCENT: 0 %
GFR AFRICAN AMERICAN: >60
GFR NON-AFRICAN AMERICAN: >60
GLUCOSE BLD-MCNC: 88 MG/DL (ref 70–99)
GLUCOSE URINE: NEGATIVE MG/DL
HCO3 VENOUS: 26.7 MMOL/L (ref 24–28)
HCT VFR BLD CALC: 31.7 % (ref 40.5–52.5)
HEMOGLOBIN, VEN, REDUCED: 3.8 %
HEMOGLOBIN: 10.7 G/DL (ref 13.5–17.5)
KETONES, URINE: NEGATIVE MG/DL
LACTIC ACID: 1.8 MMOL/L (ref 0.4–2)
LEUKOCYTE ESTERASE, URINE: NEGATIVE
LYMPHOCYTES ABSOLUTE: 0.9 K/UL (ref 1–5.1)
LYMPHOCYTES RELATIVE PERCENT: 9 %
MCH RBC QN AUTO: 32.4 PG (ref 26–34)
MCHC RBC AUTO-ENTMCNC: 33.6 G/DL (ref 31–36)
MCV RBC AUTO: 96.3 FL (ref 80–100)
METHEMOGLOBIN VENOUS: 0.5 % (ref 0–1.5)
MICROSCOPIC EXAMINATION: NORMAL
MONOCYTES ABSOLUTE: 1.6 K/UL (ref 0–1.3)
MONOCYTES RELATIVE PERCENT: 17 %
MYELOCYTE PERCENT: 1 %
NEUTROPHILS ABSOLUTE: 7.2 K/UL (ref 1.7–7.7)
NEUTROPHILS RELATIVE PERCENT: 73 %
NITRITE, URINE: NEGATIVE
O2 SAT, VEN: 96 %
PCO2, VEN: 36.6 MMHG (ref 41–51)
PDW BLD-RTO: 16.8 % (ref 12.4–15.4)
PH UA: 6.5 (ref 5–8)
PH VENOUS: 7.47 (ref 7.35–7.45)
PLATELET # BLD: 407 K/UL (ref 135–450)
PLATELET SLIDE REVIEW: ADEQUATE
PMV BLD AUTO: 7.1 FL (ref 5–10.5)
PO2, VEN: 80.4 MMHG (ref 25–40)
POLYCHROMASIA: ABNORMAL
POTASSIUM SERPL-SCNC: 3.6 MMOL/L (ref 3.5–5.1)
PRO-BNP: 144 PG/ML (ref 0–124)
PROTEIN UA: NEGATIVE MG/DL
RBC # BLD: 3.3 M/UL (ref 4.2–5.9)
SODIUM BLD-SCNC: 122 MMOL/L (ref 136–145)
SPECIFIC GRAVITY UA: <=1.005 (ref 1–1.03)
TCO2 CALC VENOUS: 28 MMOL/L
TEAR DROP CELLS: ABNORMAL
TOTAL PROTEIN: 8.5 G/DL (ref 6.4–8.2)
TROPONIN: <0.01 NG/ML
URINE TYPE: NORMAL
UROBILINOGEN, URINE: 0.2 E.U./DL
WBC # BLD: 9.7 K/UL (ref 4–11)

## 2021-07-13 PROCEDURE — 81003 URINALYSIS AUTO W/O SCOPE: CPT

## 2021-07-13 PROCEDURE — 84484 ASSAY OF TROPONIN QUANT: CPT

## 2021-07-13 PROCEDURE — 93005 ELECTROCARDIOGRAM TRACING: CPT | Performed by: EMERGENCY MEDICINE

## 2021-07-13 PROCEDURE — 85025 COMPLETE CBC W/AUTO DIFF WBC: CPT

## 2021-07-13 PROCEDURE — 99285 EMERGENCY DEPT VISIT HI MDM: CPT

## 2021-07-13 PROCEDURE — 80076 HEPATIC FUNCTION PANEL: CPT

## 2021-07-13 PROCEDURE — 87040 BLOOD CULTURE FOR BACTERIA: CPT

## 2021-07-13 PROCEDURE — 2580000003 HC RX 258: Performed by: EMERGENCY MEDICINE

## 2021-07-13 PROCEDURE — 1200000000 HC SEMI PRIVATE

## 2021-07-13 PROCEDURE — 36415 COLL VENOUS BLD VENIPUNCTURE: CPT

## 2021-07-13 PROCEDURE — 83605 ASSAY OF LACTIC ACID: CPT

## 2021-07-13 PROCEDURE — 83880 ASSAY OF NATRIURETIC PEPTIDE: CPT

## 2021-07-13 PROCEDURE — 82803 BLOOD GASES ANY COMBINATION: CPT

## 2021-07-13 PROCEDURE — 80048 BASIC METABOLIC PNL TOTAL CA: CPT

## 2021-07-13 RX ORDER — SODIUM CHLORIDE, SODIUM LACTATE, POTASSIUM CHLORIDE, CALCIUM CHLORIDE 600; 310; 30; 20 MG/100ML; MG/100ML; MG/100ML; MG/100ML
1000 INJECTION, SOLUTION INTRAVENOUS ONCE
Status: COMPLETED | OUTPATIENT
Start: 2021-07-13 | End: 2021-07-13

## 2021-07-13 RX ADMIN — SODIUM CHLORIDE, SODIUM LACTATE, POTASSIUM CHLORIDE, AND CALCIUM CHLORIDE 1000 ML: .6; .31; .03; .02 INJECTION, SOLUTION INTRAVENOUS at 19:30

## 2021-07-13 ASSESSMENT — PAIN SCALES - GENERAL: PAINLEVEL_OUTOF10: 0

## 2021-07-13 ASSESSMENT — PAIN DESCRIPTION - FREQUENCY: FREQUENCY: INTERMITTENT

## 2021-07-13 ASSESSMENT — PAIN DESCRIPTION - ORIENTATION: ORIENTATION: MID;LEFT;UPPER

## 2021-07-13 ASSESSMENT — PAIN DESCRIPTION - PAIN TYPE: TYPE: ACUTE PAIN

## 2021-07-13 ASSESSMENT — PAIN DESCRIPTION - LOCATION: LOCATION: ABDOMEN

## 2021-07-13 NOTE — ED NOTES
Bed: A11-11  Expected date:   Expected time:   Means of arrival:   Comments:  TB patient     Tab Pedroza RN  07/13/21 5838

## 2021-07-13 NOTE — TELEPHONE ENCOUNTER
I called him, called [facility] and [address] ED  Plan to get him to the ED to recheck his labs as he had significant hyponatremia and elevated liver enz and bilirubin. If labs are worse will get him admitted. Meanwhile will hold all anti TB medications and Esbriet.       Thanks
Nausea, abdominal pain, abdominal cramps, no appetite, vomiting if he eats, weight loss, weakness, lack of sleep. Can keep fluids down, but no solid food. Are these side effects from TB medication? Being treated by [facility] physicians. Rectal burning, possibly from constipation - last BM was a just a bit 2 days ago. Has had trouble with bowels for 5 days. [facility] and [facility] saline. Had testing there, including CT abd & pelvis. Slight nose bleed today. No fever. Per nephew, he feels miserable. He is not taking Esbriet now, b/c it is to be taken after meals and he is not eating. PCP has prescribed Zofran and Fanotidine, which is not well. Nephew is requesting that Dr. Shereen Tucker speak with Tahir Carlisle to see if his dose can be lowered. Are there any tests which can be ordered that show staus of disease, or if anything else is going on? Please call Jose Luis Franks at [phone].
sq lovenox

## 2021-07-14 ENCOUNTER — APPOINTMENT (OUTPATIENT)
Dept: ULTRASOUND IMAGING | Age: 66
DRG: 442 | End: 2021-07-14
Payer: MEDICARE

## 2021-07-14 PROBLEM — E87.1 HYPONATREMIA: Status: ACTIVE | Noted: 2021-07-14

## 2021-07-14 LAB
ALBUMIN SERPL-MCNC: 2.2 G/DL (ref 3.4–5)
ALP BLD-CCNC: 190 U/L (ref 40–129)
ALT SERPL-CCNC: 89 U/L (ref 10–40)
ANION GAP SERPL CALCULATED.3IONS-SCNC: 9 MMOL/L (ref 3–16)
ANISOCYTOSIS: ABNORMAL
AST SERPL-CCNC: 91 U/L (ref 15–37)
BACTERIA: ABNORMAL /HPF
BASOPHILS ABSOLUTE: 0 K/UL (ref 0–0.2)
BASOPHILS RELATIVE PERCENT: 0 %
BILIRUB SERPL-MCNC: 6.6 MG/DL (ref 0–1)
BILIRUBIN DIRECT: 5.1 MG/DL (ref 0–0.3)
BILIRUBIN URINE: ABNORMAL
BILIRUBIN, INDIRECT: 1.5 MG/DL (ref 0–1)
BLOOD, URINE: NEGATIVE
BUN BLDV-MCNC: 12 MG/DL (ref 7–20)
CALCIUM SERPL-MCNC: 8.1 MG/DL (ref 8.3–10.6)
CHLORIDE BLD-SCNC: 93 MMOL/L (ref 99–110)
CLARITY: CLEAR
CO2: 25 MMOL/L (ref 21–32)
COLOR: ABNORMAL
CREAT SERPL-MCNC: 0.7 MG/DL (ref 0.8–1.3)
EOSINOPHILS ABSOLUTE: 0 K/UL (ref 0–0.6)
EOSINOPHILS RELATIVE PERCENT: 0 %
EPITHELIAL CELLS, UA: ABNORMAL /HPF (ref 0–5)
FERRITIN: 1762 NG/ML (ref 30–400)
GFR AFRICAN AMERICAN: >60
GFR NON-AFRICAN AMERICAN: >60
GLUCOSE BLD-MCNC: 101 MG/DL (ref 70–99)
GLUCOSE URINE: NEGATIVE MG/DL
HAV IGM SER IA-ACNC: NORMAL
HCT VFR BLD CALC: 29.9 % (ref 40.5–52.5)
HEMOGLOBIN: 10.2 G/DL (ref 13.5–17.5)
HEPATITIS B CORE IGM ANTIBODY: NORMAL
HEPATITIS B SURFACE ANTIGEN INTERPRETATION: NORMAL
HEPATITIS C ANTIBODY INTERPRETATION: NORMAL
IRON SATURATION: 100 % (ref 20–50)
IRON: 131 UG/DL (ref 59–158)
KETONES, URINE: ABNORMAL MG/DL
LEUKOCYTE ESTERASE, URINE: NEGATIVE
LYMPHOCYTES ABSOLUTE: 0.8 K/UL (ref 1–5.1)
LYMPHOCYTES RELATIVE PERCENT: 10 %
MACROCYTES: ABNORMAL
MCH RBC QN AUTO: 32 PG (ref 26–34)
MCHC RBC AUTO-ENTMCNC: 33.9 G/DL (ref 31–36)
MCV RBC AUTO: 94.2 FL (ref 80–100)
MICROSCOPIC EXAMINATION: ABNORMAL
MONOCYTES ABSOLUTE: 1.3 K/UL (ref 0–1.3)
MONOCYTES RELATIVE PERCENT: 15 %
NEUTROPHILS ABSOLUTE: 6.3 K/UL (ref 1.7–7.7)
NEUTROPHILS RELATIVE PERCENT: 75 %
NITRITE, URINE: NEGATIVE
OSMOLALITY URINE: 295 MOSM/KG (ref 390–1070)
OSMOLALITY: 275 MOSM/KG (ref 278–305)
PDW BLD-RTO: 16.8 % (ref 12.4–15.4)
PH UA: 7 (ref 5–8)
PLATELET # BLD: 353 K/UL (ref 135–450)
PMV BLD AUTO: 6.9 FL (ref 5–10.5)
POLYCHROMASIA: ABNORMAL
POTASSIUM REFLEX MAGNESIUM: 3.9 MMOL/L (ref 3.5–5.1)
PROTEIN UA: NEGATIVE MG/DL
RBC # BLD: 3.17 M/UL (ref 4.2–5.9)
RBC UA: ABNORMAL /HPF (ref 0–4)
SODIUM BLD-SCNC: 127 MMOL/L (ref 136–145)
SODIUM URINE: 71 MMOL/L
SPECIFIC GRAVITY UA: 1.01 (ref 1–1.03)
TOTAL IRON BINDING CAPACITY: 131 UG/DL (ref 260–445)
TOTAL PROTEIN: 7.8 G/DL (ref 6.4–8.2)
URINE REFLEX TO CULTURE: ABNORMAL
URINE TYPE: ABNORMAL
UROBILINOGEN, URINE: 0.2 E.U./DL
WBC # BLD: 8.4 K/UL (ref 4–11)
WBC UA: ABNORMAL /HPF (ref 0–5)

## 2021-07-14 PROCEDURE — 84295 ASSAY OF SERUM SODIUM: CPT

## 2021-07-14 PROCEDURE — 83930 ASSAY OF BLOOD OSMOLALITY: CPT

## 2021-07-14 PROCEDURE — 82728 ASSAY OF FERRITIN: CPT

## 2021-07-14 PROCEDURE — 1200000000 HC SEMI PRIVATE

## 2021-07-14 PROCEDURE — 6360000002 HC RX W HCPCS: Performed by: INTERNAL MEDICINE

## 2021-07-14 PROCEDURE — 76705 ECHO EXAM OF ABDOMEN: CPT

## 2021-07-14 PROCEDURE — 85025 COMPLETE CBC W/AUTO DIFF WBC: CPT

## 2021-07-14 PROCEDURE — 80076 HEPATIC FUNCTION PANEL: CPT

## 2021-07-14 PROCEDURE — 83540 ASSAY OF IRON: CPT

## 2021-07-14 PROCEDURE — 83550 IRON BINDING TEST: CPT

## 2021-07-14 PROCEDURE — 80074 ACUTE HEPATITIS PANEL: CPT

## 2021-07-14 PROCEDURE — 80048 BASIC METABOLIC PNL TOTAL CA: CPT

## 2021-07-14 PROCEDURE — 81001 URINALYSIS AUTO W/SCOPE: CPT

## 2021-07-14 PROCEDURE — 2580000003 HC RX 258: Performed by: INTERNAL MEDICINE

## 2021-07-14 PROCEDURE — 84300 ASSAY OF URINE SODIUM: CPT

## 2021-07-14 PROCEDURE — 6370000000 HC RX 637 (ALT 250 FOR IP): Performed by: STUDENT IN AN ORGANIZED HEALTH CARE EDUCATION/TRAINING PROGRAM

## 2021-07-14 PROCEDURE — 99223 1ST HOSP IP/OBS HIGH 75: CPT | Performed by: INTERNAL MEDICINE

## 2021-07-14 PROCEDURE — 83935 ASSAY OF URINE OSMOLALITY: CPT

## 2021-07-14 PROCEDURE — 36415 COLL VENOUS BLD VENIPUNCTURE: CPT

## 2021-07-14 RX ORDER — ACETAMINOPHEN 325 MG/1
650 TABLET ORAL EVERY 6 HOURS PRN
Status: DISCONTINUED | OUTPATIENT
Start: 2021-07-14 | End: 2021-07-16 | Stop reason: HOSPADM

## 2021-07-14 RX ORDER — SODIUM CHLORIDE 0.9 % (FLUSH) 0.9 %
5-40 SYRINGE (ML) INJECTION EVERY 12 HOURS SCHEDULED
Status: DISCONTINUED | OUTPATIENT
Start: 2021-07-14 | End: 2021-07-16 | Stop reason: HOSPADM

## 2021-07-14 RX ORDER — SODIUM CHLORIDE 0.9 % (FLUSH) 0.9 %
5-40 SYRINGE (ML) INJECTION PRN
Status: DISCONTINUED | OUTPATIENT
Start: 2021-07-14 | End: 2021-07-16 | Stop reason: HOSPADM

## 2021-07-14 RX ORDER — METOCLOPRAMIDE HYDROCHLORIDE 5 MG/ML
10 INJECTION INTRAMUSCULAR; INTRAVENOUS EVERY 6 HOURS
Status: DISCONTINUED | OUTPATIENT
Start: 2021-07-14 | End: 2021-07-15

## 2021-07-14 RX ORDER — PYRAZINAMIDE TABLET 500 MG/1
1500 TABLET ORAL DAILY
Status: ON HOLD | COMMUNITY
End: 2021-07-15 | Stop reason: HOSPADM

## 2021-07-14 RX ORDER — ETHAMBUTOL HYDROCHLORIDE 400 MG/1
1200 TABLET, FILM COATED ORAL DAILY
Status: ON HOLD | COMMUNITY
End: 2021-07-15 | Stop reason: HOSPADM

## 2021-07-14 RX ORDER — ONDANSETRON 4 MG/1
4 TABLET, ORALLY DISINTEGRATING ORAL EVERY 8 HOURS PRN
Status: DISCONTINUED | OUTPATIENT
Start: 2021-07-14 | End: 2021-07-16 | Stop reason: HOSPADM

## 2021-07-14 RX ORDER — LANOLIN ALCOHOL/MO/W.PET/CERES
50 CREAM (GRAM) TOPICAL DAILY
COMMUNITY

## 2021-07-14 RX ORDER — ONDANSETRON 2 MG/ML
4 INJECTION INTRAMUSCULAR; INTRAVENOUS EVERY 6 HOURS PRN
Status: DISCONTINUED | OUTPATIENT
Start: 2021-07-14 | End: 2021-07-16 | Stop reason: HOSPADM

## 2021-07-14 RX ORDER — POLYETHYLENE GLYCOL 3350 17 G/17G
17 POWDER, FOR SOLUTION ORAL DAILY PRN
Status: DISCONTINUED | OUTPATIENT
Start: 2021-07-14 | End: 2021-07-16 | Stop reason: HOSPADM

## 2021-07-14 RX ORDER — FAMOTIDINE 20 MG/1
20 TABLET, FILM COATED ORAL 2 TIMES DAILY
Status: DISCONTINUED | OUTPATIENT
Start: 2021-07-14 | End: 2021-07-16 | Stop reason: HOSPADM

## 2021-07-14 RX ORDER — ISONIAZID 300 MG/1
300 TABLET ORAL DAILY
Status: ON HOLD | COMMUNITY
End: 2021-07-15 | Stop reason: HOSPADM

## 2021-07-14 RX ORDER — SODIUM CHLORIDE 9 MG/ML
INJECTION, SOLUTION INTRAVENOUS CONTINUOUS
Status: DISCONTINUED | OUTPATIENT
Start: 2021-07-14 | End: 2021-07-16 | Stop reason: HOSPADM

## 2021-07-14 RX ORDER — ACETAMINOPHEN 650 MG/1
650 SUPPOSITORY RECTAL EVERY 6 HOURS PRN
Status: DISCONTINUED | OUTPATIENT
Start: 2021-07-14 | End: 2021-07-16 | Stop reason: HOSPADM

## 2021-07-14 RX ORDER — SODIUM CHLORIDE 9 MG/ML
25 INJECTION, SOLUTION INTRAVENOUS PRN
Status: DISCONTINUED | OUTPATIENT
Start: 2021-07-14 | End: 2021-07-16 | Stop reason: HOSPADM

## 2021-07-14 RX ADMIN — SODIUM CHLORIDE: 9 INJECTION, SOLUTION INTRAVENOUS at 17:17

## 2021-07-14 RX ADMIN — METOCLOPRAMIDE HYDROCHLORIDE 10 MG: 5 INJECTION INTRAMUSCULAR; INTRAVENOUS at 14:14

## 2021-07-14 RX ADMIN — FAMOTIDINE 20 MG: 20 TABLET ORAL at 20:37

## 2021-07-14 RX ADMIN — METOCLOPRAMIDE HYDROCHLORIDE 10 MG: 5 INJECTION INTRAMUSCULAR; INTRAVENOUS at 18:50

## 2021-07-14 ASSESSMENT — PAIN SCALES - GENERAL
PAINLEVEL_OUTOF10: 0
PAINLEVEL_OUTOF10: 2
PAINLEVEL_OUTOF10: 0

## 2021-07-14 ASSESSMENT — ENCOUNTER SYMPTOMS
CONSTIPATION: 0
COLOR CHANGE: 0
CHEST TIGHTNESS: 0
CONSTIPATION: 1
NAUSEA: 1
SHORTNESS OF BREATH: 0
DIARRHEA: 0
ABDOMINAL PAIN: 0
VOMITING: 0
COUGH: 1
ABDOMINAL PAIN: 1
ABDOMINAL DISTENTION: 0
BACK PAIN: 0
VOMITING: 1

## 2021-07-14 ASSESSMENT — PAIN DESCRIPTION - FREQUENCY: FREQUENCY: INTERMITTENT

## 2021-07-14 ASSESSMENT — PAIN DESCRIPTION - LOCATION: LOCATION: ABDOMEN

## 2021-07-14 ASSESSMENT — PAIN DESCRIPTION - PAIN TYPE: TYPE: ACUTE PAIN

## 2021-07-14 ASSESSMENT — PAIN DESCRIPTION - DESCRIPTORS: DESCRIPTORS: CRAMPING

## 2021-07-14 NOTE — PROGRESS NOTES
Patient admitted to room 4459 via stretcher from ED at 0029. Patient alert and oriented x3. VSS. SpO2 100% on 2L/NC  No complaints. Airborne precautions in place. In Negative airflow room. Call light in reach. Patient to call with needs.

## 2021-07-14 NOTE — PROGRESS NOTES
4 Eyes Admission Assessment     I agree as the admission nurse that 2 RN's have performed a thorough Head to Toe Skin Assessment on the patient. ALL assessment sites listed below have been assessed on admission. Areas assessed by both nurses:   [x]   Head, Face, and Ears   [x]   Shoulders, Back, and Chest  [x]   Arms, Elbows, and Hands   [x]   Coccyx, Sacrum, and Ischium  [x]   Legs, Feet, and Heels        Does the Patient have Skin Breakdown?   No         Aly Prevention initiated:  No   Wound Care Orders initiated:  No      [facility] nurse consulted for Pressure Injury (Stage 3,4, Unstageable, DTI, NWPT, and Complex wounds) or Aly score 18 or lower:  No      Nurse 1 eSignature: Electronically signed by Jo Ann Foley RN on 7/14/21 at 1:07 AM EDT    Nurse 2 eSignature: Electronically signed by Rl Edward RN on 7/14/21 at 12:35 AM EDT

## 2021-07-14 NOTE — PLAN OF CARE
Problem: Respiratory  Goal: No pulmonary complications  0/81/8280 1042 by Jackie Ivy RN  Outcome: Ongoing  7/14/2021 0627 by Margie Starr RN  Outcome: Ongoing     Problem: Respiratory  Goal: No pulmonary complications  0/39/0527 1042 by Jackie Ivy RN  Outcome: Ongoing  7/14/2021 0627 by Margie Starr RN  Outcome: Ongoing     Problem: Respiratory:  Goal: Ability to maintain normal respiratory secretions will improve  Description: Ability to maintain normal respiratory secretions will improve  Outcome: Ongoing     Problem: Fluid Volume:  Goal: Ability to achieve a balanced intake and output will improve  Description: Ability to achieve a balanced intake and output will improve  7/14/2021 1042 by Jackie Ivy RN  Outcome: Ongoing  7/14/2021 0627 by Margie Starr RN  Outcome: Ongoing  Intervention: Assess signs and symptoms of electrolyte imbalance  7/14/2021 0627 by Margie Starr RN  Note: Monitoring Sodium levels Q6 hours. Last draw was 127. Patient educated on fluid restriction. Will monitor.

## 2021-07-14 NOTE — ED NOTES
Report attempted to PCU at this time. U states they cannot take report at this time.      Sanchez Armstrong RN  07/13/21 1456

## 2021-07-14 NOTE — PROGRESS NOTES
Progress Note    Admit Date: 7/13/2021  Day: 0  Diet: ADULT DIET; Regular; 1000 ml; Vegetarian (Lacto-Ovo)    CC: Labs, hypokalemia and transaminitis    Interval history: RANDAL. Patient was seen and examined this am. He was laying in bed comfortably, AOx3, NAD. Reported a non-productive cough, mild, RUQ abdominal pain and nausea that started prior to admission but denied any fevers, chills, SOB, HA, dizziness, palpitations, abdominal pain, leg swelling or urinary symptoms. Medications:     Scheduled Meds:   famotidine  20 mg Oral BID    sodium chloride flush  5-40 mL Intravenous 2 times per day    enoxaparin  40 mg Subcutaneous Daily     Continuous Infusions:   sodium chloride       PRN Meds:sodium chloride flush, sodium chloride, ondansetron **OR** ondansetron, polyethylene glycol, acetaminophen **OR** acetaminophen    Objective:   Vitals:   T-max:  Patient Vitals for the past 8 hrs:   BP Temp Temp src Pulse Resp SpO2   07/14/21 0808 122/79 97.6 °F (36.4 °C) Oral 86 18 97 %   07/14/21 0620 -- -- -- 80 -- --   07/14/21 0354 122/79 97.6 °F (36.4 °C) Oral 79 21 99 %   07/14/21 0159 -- -- -- 90 21 --   07/14/21 0154 -- -- -- 79 -- --       Intake/Output Summary (Last 24 hours) at 7/14/2021 0927  Last data filed at 7/14/2021 0600  Gross per 24 hour   Intake 1500 ml   Output 525 ml   Net 975 ml       Review of Systems   Constitutional: Negative for chills and fever. Respiratory: Positive for cough (non-productive). Negative for chest tightness and shortness of breath. Cardiovascular: Negative for chest pain, palpitations and leg swelling. Gastrointestinal: Positive for abdominal pain (mild) and nausea. Negative for constipation, diarrhea and vomiting. Genitourinary: Negative for difficulty urinating, dysuria, frequency and urgency. Musculoskeletal: Negative for back pain. Skin: Negative for rash. Neurological: Negative for dizziness and headaches.        Physical Exam  Constitutional: General: He is not in acute distress. Appearance: Normal appearance. HENT:      Head: Normocephalic and atraumatic. Nose: No congestion or rhinorrhea. Mouth/Throat:      Mouth: Mucous membranes are moist.      Pharynx: No oropharyngeal exudate or posterior oropharyngeal erythema. Eyes:      General: No scleral icterus. Conjunctiva/sclera: Conjunctivae normal.   Cardiovascular:      Rate and Rhythm: Normal rate and regular rhythm. Pulses: Normal pulses. Heart sounds: Normal heart sounds. No murmur heard. No gallop. Pulmonary:      Effort: Pulmonary effort is normal. No respiratory distress. Breath sounds: Wheezing and rhonchi present. Abdominal:      General: There is no distension. Palpations: Abdomen is soft. Tenderness: There is no abdominal tenderness. There is no guarding. Musculoskeletal:         General: No swelling or tenderness. Cervical back: Neck supple. No tenderness. Comments: S/p BKA   Skin:     Capillary Refill: Capillary refill takes less than 2 seconds. Coloration: Skin is not jaundiced or pale. Findings: No erythema or rash. Neurological:      General: No focal deficit present. Mental Status: He is alert and oriented to person, place, and time. Psychiatric:         Mood and Affect: Mood normal.         LABS:    CBC:   Recent Labs     07/11/21 1909 07/13/21 1915 07/14/21  0515   WBC 11.8* 9.7 8.4   HGB 10.8* 10.7* 10.2*   HCT 32.8* 31.7* 29.9*   * 407 353   MCV 95.1 96.3 94.2     Renal:    Recent Labs     07/11/21 1909 07/11/21 1909 07/13/21 1915 07/14/21  0201 07/14/21  0515   *   < > 122* 127* 127*   K 4.3  --  3.6  --  3.9   CL 88*  --  88*  --  93*   CO2 25  --  23  --  25   BUN 12  --  11  --  12   CREATININE 0.6*  --  0.7*  --  0.7*   GLUCOSE 101*  --  88  --  101*   CALCIUM 8.2*  --  7.9*  --  8.1*   ANIONGAP 10  --  11  --  9    < > = values in this interval not displayed.      Hepatic: Recent Labs     07/11/21 1909 07/13/21 1915 07/14/21  0515   * 102* 91*   * 101* 89*   BILITOT 4.6* 6.0* 6.6*   BILIDIR  --  4.6* 5.1*   PROT 8.7* 8.5* 7.8   LABALBU 2.6* 2.4* 2.2*   ALKPHOS 288* 242* 190*     Troponin:   Recent Labs     07/13/21 1915   TROPONINI <0.01     BNP: No results for input(s): BNP in the last 72 hours. Lipids: No results for input(s): CHOL, HDL in the last 72 hours. Invalid input(s): LDLCALCU, TRIGLYCERIDE  ABGs:  No results for input(s): PHART, DND9JPY, PO2ART, CRL2LMQ, BEART, THGBART, H9XNVURP, QBF6OIK in the last 72 hours. INR: No results for input(s): INR in the last 72 hours. Lactate: No results for input(s): LACTATE in the last 72 hours. Cultures:  -----------------------------------------------------------------  RAD:   US ABDOMEN LIMITED Specify organ? GALLBLADDER, LIVER, PANCREAS    (Results Pending)       Assessment/Plan:     Keena LYNNE Is a 78 y/o male with a PMH of idiopathic pulmonary fibrosis, chronic respiratory failure with hypoxia, pneumonia of left upper lobe due to infectious organism, RLL osteomyelitis s/p amputation, pulmonary tuberculosis who is admitted for hypokalemia and transamintits. Drug Induced Hepatitis  Patient recently started on RIPE therapy 12 days prior to presentation. ALT, AST, Alk phos initially elevated, trending downward. CT chest abdomen showed no acute abdominal pathology or obstruction on 7/11. Follows up with pulm outpt for IPF.  - Discontinue RIPE therapy  - Discontinue pirfenidone (esbriet)  - RUQ ultrasound pending    Hyponatremia, likely 2/2 hypovolemic hyponatremia  Sodium at 127 on admission. Sodium improved to 127  with IVF. - Trend Na q6hr  - Hold IVF for now     Tuberculosis  - Airborne isolation, contact and droplet isolation  - Consult ID for RIPE tx recs  - Blood cx pending  - Pulm consulted    Chronic hypoxic respiratory failure  On 2 L of O2 at home.  History of IPF with last PFTs done outpatient. Follows up with pulm out patient. - Continue to monitor oxygen requirement  - Titrate O2 as needed      Normocytic anemia likely 2/2 to Anemia of Chronic Disease (stable)  - Continue to monitor daily CBD  - Ferritin 1762     Malnutrition  - Consulted Dietician       Code Status: Full  FEN: regular diet  PPX: lovenox  DISPO: Merlin Kallman, MD, PGY-1  07/14/21  9:27 AM    This patient has been staffed and discussed with Mariana Dunaway MD.   Patient seen and examined, labs and imaging studies reviewed, agree with assessment and plan as outlined above. Continue with current care and plan. Discussed case with patients nurse, discussed case with care team, discussed plan. jaymie coon patient and wife, will reach out to health department, nausea is improved will give reglan and monitor.       Mariana Dunaway MD [address]

## 2021-07-14 NOTE — CONSULTS
Pulmonary Medicine Consult Note      Reason for Consult: TB  Requesting Physician: Dr. Amador Neigh:   [address] [facility]:                The patient is a 77 y.o. 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 Pt is being treated for TB (Bronch 06/23 AFB 1+, mTB probe positive)  By [address] with RIPE for past 12 days. Pt reports decreased appetite and nausea since start of the treatment regimen. Pt was recently at [facility] ED with abdominal pain, anorexia, N/V at which time he had a sodium of 123 mg/dl, chloride of 88, and elevated transaminases; alkaline phosphatase 288, , . Pt was asked to present to [facility] ED by his pulmonologist for transaminitis and hyponatremia. In the ED, pt was found to have Na of 122, LFT remains elevated compared to 07/11, Bili was elevated to 6 from 4.6. Pt currently does not have any acute complaints. Last PO meal was yesterday around 3 pm. Minimal nausea, no vomiting. Denies abdominal pain. Denies CP, SOB, cough, mucous production,  sx.        Past Medical History:      Diagnosis Date    Amputation of leg below knee, right, traumatic, complicated, subsequent encounter     MTB (Mycobacterium tuberculosis infection) 06/23/2021    Pulmonary fibrosis ([address]       Past Surgical History:        Procedure Laterality Date    BRONCHOSCOPY N/A 6/23/2021    BRONCHOSCOPY BRONCHOALVEOLAR LAVAGE performed by Giuseppe Vogel MD at [address]  6/23/2021    BRONCHOSCOPY BRUSHINGS performed by Giuseppe Vogel MD at [address]  5/21/2021    CT GUIDED CHEST TUBE 5/21/2021 [facility] CT SCAN    CT NEEDLE BIOPSY LUNG PERCUTANEOUS  5/21/2021    CT NEEDLE BIOPSY LUNG PERCUTANEOUS 5/21/2021 [facility] CT SCAN     Current Medications:     famotidine  20 mg Oral BID    sodium chloride flush  5-40 mL Intravenous 2 times per day    enoxaparin  40 mg Subcutaneous Daily        Social History:    Lives at home with wife. ADL independently. Lifetime non smoker. Denies alcohol and drug use    Objective:   PHYSICAL EXAM:      VITALS:  /79   Pulse 86   Temp 97.6 °F (36.4 °C) (Oral)   Resp 18   Ht 5' 7\" (1.702 m)   Wt 133 lb 13.1 oz (60.7 kg)   SpO2 97%   BMI 20.96 kg/m²   24HR INTAKE/OUTPUT:      Intake/Output Summary (Last 24 hours) at 2021 1317  Last data filed at 2021 0600  Gross per 24 hour   Intake 1500 ml   Output 525 ml   Net 975 ml     CURRENT PULSE OXIMETRY:  SpO2: 97 %  24HR PULSE OXIMETRY RANGE:  SpO2  Av.8 %  Min: 97 %  Max: 100 %     Physical Exam  Constitutional:       General: He is not in acute distress. Appearance: Normal appearance. He is normal weight. He is not ill-appearing, toxic-appearing or diaphoretic. Cardiovascular:      Rate and Rhythm: Normal rate and regular rhythm. Pulses: Normal pulses. Heart sounds: Normal heart sounds. No murmur heard. Pulmonary:      Effort: Pulmonary effort is normal. No respiratory distress. Breath sounds: Wheezing and rhonchi present. Abdominal:      General: Bowel sounds are normal. There is no distension. Palpations: Abdomen is soft. Tenderness: There is no abdominal tenderness. Musculoskeletal:         General: No swelling. Neurological:      Mental Status: He is alert and oriented to person, place, and time. Mental status is at baseline.        DATA:    Old records have been reviewed  CBC with Differential:    Lab Results   Component Value Date    WBC 8.4 2021    RBC 3.17 2021    HGB 10.2 2021    HCT 29.9 2021     2021    MCV 94.2 2021    MCH 32.0 2021    MCHC 33.9 2021    RDW 16.8 2021    LYMPHOPCT 10.0 2021    MONOPCT 15.0 2021    MYELOPCT 1 scan indicates the left apical consolidation with cavitation has increased significantly over the past 2 months. Treatment for pulmonary fibrosis is on hold at this time.   Hyponatremia responding to water restriction

## 2021-07-14 NOTE — CARE COORDINATION
Case Management Assessment           Initial Evaluation                Date / Time of Evaluation: 7/14/2021 3:50 PM                 Assessment Completed by: Evelyn Lira RN    Patient Name: Neal Palm     YOB: 1955  Diagnosis: Pulmonary tuberculosis [A15.0]     Date / Time: 7/13/2021  6:38 PM    Patient Admission Status: Inpatient    If patient is discharged prior to next notation, then this note serves as note for discharge by case management. Current PCP: Jayme Vazquez DO  Clinic Patient: No    Chart Reviewed: Yes  Patient/ Family Interviewed: Yes    Initial assessment completed at bedside with: patient over the phone due to isolation for TB and nephew over the phone who pt gave me permission to call as he speaks better English per pt. Hospitalization in the last 30 days: No    Emergency Contacts:  Extended Emergency Contact Information  Primary Emergency Contact: Angelitodevi Oswald, [address] [place] [address] Phone: [phone]  Relation: Niece/Nephew  Secondary Emergency Contact: Mega Dacosta of [address] [place] [address] Phone: [phone]  Mobile Phone: [phone]  Relation: Spouse    Advance Directives:   Code Status: Full [address]: No    Financial  Payor: Deana Hopkins / Plan: MEDICARE PART A AND B / Product Type: *No Product type* /     Pre-cert required for SNF: No    Pharmacy    [facility] [id number], [address] [place] [address]  Phone: [phone] Fax: [phone]      Potential assistance Purchasing Medications: Potential Assistance Purchasing Medications: Yes  Does Patient want to participate in local refill/ meds to beds program?: Yes    Meds To Beds General Rules:  1. Can ONLY be done Monday- Friday between 8:30am-5pm  2. Prescription(s) must be in pharmacy by 3pm to be filled same day  3. Copy of patient's insurance/ prescription drug card and patient face sheet must be sent along with the prescription(s)  4. Cost of Rx cannot be added to hospital bill. If financial assistance is needed, please contact unit  or ;  or  CANNOT provide pharmacy voucher for patients co-pays  5. Patients can then  the prescription on their way out of the hospital at discharge, or pharmacy can deliver to the bedside if staff is available. (payment due at time of pick-up or delivery - cash, check, or card accepted)     Able to afford home medications/ co-pay costs: Yes    ADLS  Support Systems: Spouse/Significant Other, Family Members    PT AM-PAC:   /24  OT AM-PAC:   /24    [place]: From home with wife and two adult nephews  Steps: a few to get in     Plans to RETURN to current housing: Yes  Barriers to RETURNING to current housing: medical complications    Clement Sánchez 76  Currently ACTIVE with [address]: No  Home Care Agency: Not Applicable    Currently ACTIVE with [place] on Aging: No      Durable Medical Equipment  DME Provider: n/a  Equipment: cane and walker    Home Oxygen and [address] [place] prior to admission: Yes  [address]: [facility]  Phone: [phone]  Other Respiratory Equipment: no    Informed of need to bring portable home O2 tank on day of DISCHARGE for nursing to connect prior to leaving: Yes  Verbalized agreement/Understanding: Yes  Person to bring portable tank at discharge: malka Larkin Pih:  Disposition: TBD    Transportation PLAN for discharge: family     Factors facilitating achievement of predicted outcomes: Cooperative, Pleasant and Has needed Durable Medical Equipment at home    Barriers to discharge: Medical complications and Medication managment    Additional Case Management Notes: Pt is from home with wife and two adult nephews. Pt has walker and cane but does not use. Pt has prosthetic for RLL.  Pt has nurse from [facility] coming out once a week for blood draws for TB. Nephew to transport at discharge. HHC to be determined. PT gets O2 through [facility] and is currently on 2 L which is his  Baseline. Pt's nephew Jaki Walter speaks  English and is happy to answer any questions and pt has given permission to speak with him. Pt also speaks Jorge Boles but is a little more limited. The Plan for Transition of Care is related to the following treatment goals of Pulmonary tuberculosis [A15.0]    The Patient and/or patient representative Inessa Triana and his family were provided with a choice of provider and agrees with the discharge plan Yes    Freedom of choice list was provided with basic dialogue that supports the patient's individualized plan of care/goals and shares the quality data associated with the providers.  Yes    Care Transition patient: No    Dunia Singletary RN  [facility]  Case Management Department  Ph: [phone]   Fax: [phone]

## 2021-07-14 NOTE — PROGRESS NOTES
ID Chart note  Consult received, chart reviewed     Recent diagnosis pulm MTB infection (+BAL culture 6/23/21, smear +)    Started on standard 4 drug RIPE therapy ~7/2/21  Admitted 7/11/21 with abd pain, anorexia, N/V  Cholestatic hepatitis noted  TB meds held     CT abd 7/11/21 neg  Abd US 7/14/21 no gallstones    AST/ALT trending down, TBili still rising   WBC is wnl     Patient is not acutely ill from pulm standpoint, so it will be ok to hold TB meds at this point (vs more urgently starting a less hepatotoxic regimen)  INH, rif, PZA more likely culprits vs ethambutol     At this point best to continue to hold all meds and trend LFTs hoping to see those normalize and then sequentially re-introduce the meds    Daily LFTs  Airborne isolation    Formal ID consult tomorrow  Kashif Montoya MD

## 2021-07-14 NOTE — ED PROVIDER NOTES
[address] [facility]          ATTENDING PHYSICIAN NOTE       Date of evaluation: 7/13/2021    Chief Complaint     Shortness of Breath (patient TB positive 12 days ago. pt complains of sob and weight loss. )      History of Present Illness     Vicki Turpin is a 77 y.o. male who presents with concern for decreased p.o. intake, elevated transaminases, and low sodium noted several days ago. Approximately 12 days ago the patient was started on rifampin, isoniazid, pyrazinamide and ethambutol as treatment for TB noted on bronchoscopy. He apparently has had some difficulty tolerating it, causing him to have significant nausea, pain with urination, and some right upper quadrant pain which caused him to present to an outside emergency department 2 days ago, and noted there to have elevation of transaminases and a low sodium. He said he was feeling better at the time was subsequently discharged home. In follow-up now, the patient says that he feels a little better today, is not having pain when he goes to the bathroom, and his nausea is better, still has some abdominal pain. Review of Systems     Review of Systems  Pertinent positives and negatives are listed in the HPI; otherwise all systems are reviewed and were negative. Past Medical, Surgical, Family, and Social History     He has a past medical history of Amputation of leg below knee, right, traumatic, complicated, subsequent encounter, MTB (Mycobacterium tuberculosis infection), and Pulmonary fibrosis ([facility] [address] He has a past surgical history that includes CT NEEDLE BIOPSY LUNG PERCUTANEOUS (5/21/2021); CT GUIDED CHEST TUBE (5/21/2021); bronchoscopy (N/A, 6/23/2021); and bronchoscopy (6/23/2021). His family history is not on file. He reports that he has never smoked. He has never used smokeless tobacco. He reports current alcohol use of about 1.0 standard drinks of alcohol per week.  He reports that he does not use drugs.    Medications     Previous Medications    ARTIFICIAL SALIVA (SALIVAMAX) PACK    Take 1 Package by mouth 3 times daily    BIOTIN PO    Take by mouth daily    CHOLECALCIFEROL (VITAMIN D3 PO)    Take by mouth    FAMOTIDINE (PEPCID) 20 MG TABLET    Take 1 tablet by mouth 2 times daily    OMEPRAZOLE (PRILOSEC) 40 MG DELAYED RELEASE CAPSULE    Take 40 mg by mouth     ONDANSETRON (ZOFRAN) 4 MG TABLET    Take 1 tablet by mouth daily as needed for Nausea or Vomiting    PIRFENIDONE (ESBRIET) 267 MG TABS    Days 1 to 7: 267 mg 3 times daily (total dose: 801 mg/day) Days 8 to 14: 534 mg 3 times daily (total dose: 1,602 mg/day) Day 15 and thereafter: 801 mg 3 times daily (total dose: 2,403 mg/day)    SERTRALINE (ZOLOFT) 25 MG TABLET    Take 1 tablet by mouth daily       Allergies     He has No Known Allergies. Physical Exam     INITIAL VITALS: BP: (!) 148/90, Temp: 97.9 °F (36.6 °C), Pulse: 97, Resp: 25, SpO2: 100 %   Physical Exam  Constitutional:       General: He is not in acute distress. HENT:      Head: Normocephalic and atraumatic. Pulmonary:      Effort: Tachypnea present. Chest:      Chest wall: Tenderness (left upper chest) present. No crepitus. Abdominal:      Palpations: Abdomen is soft. Tenderness: There is abdominal tenderness (mild RUQ). Skin:     General: Skin is warm. Neurological:      General: No focal deficit present. Mental Status: He is alert and oriented to person, place, and time.          Diagnostic Results     EKG       RADIOLOGY:  No orders to display       LABS:   Results for orders placed or performed during the hospital encounter of 92/82/30   Basic Metabolic Panel   Result Value Ref Range    Sodium 122 (L) 136 - 145 mmol/L    Potassium 3.6 3.5 - 5.1 mmol/L    Chloride 88 (L) 99 - 110 mmol/L    CO2 23 21 - 32 mmol/L    Anion Gap 11 3 - 16    Glucose 88 70 - 99 mg/dL    BUN 11 7 - 20 mg/dL    CREATININE 0.7 (L) 0.8 - 1.3 mg/dL    GFR Non-African American >60 >60    GFR African American >60 >60    Calcium 7.9 (L) 8.3 - 10.6 mg/dL   Troponin   Result Value Ref Range    Troponin <0.01 <0.01 ng/mL   CBC Auto Differential   Result Value Ref Range    WBC 9.7 4.0 - 11.0 K/uL    RBC 3.30 (L) 4.20 - 5.90 M/uL    Hemoglobin 10.7 (L) 13.5 - 17.5 g/dL    Hematocrit 31.7 (L) 40.5 - 52.5 %    MCV 96.3 80.0 - 100.0 fL    MCH 32.4 26.0 - 34.0 pg    MCHC 33.6 31.0 - 36.0 g/dL    RDW 16.8 (H) 12.4 - 15.4 %    Platelets 081 679 - 279 K/uL    MPV 7.1 5.0 - 10.5 fL   Brain Natriuretic Peptide   Result Value Ref Range    Pro- (H) 0 - 124 pg/mL   Blood gas, venous (Lab)   Result Value Ref Range    pH, Willian 7.472 (H) 7.350 - 7.450    pCO2, Willian 36.6 (L) 41.0 - 51.0 mmHg    pO2, Willian 80.4 (H) 25 - 40 mmHg    HCO3, Venous 26.7 24.0 - 28.0 mmol/L    Base Excess, Willian 3.0 -2.0 - 3.0 mmol/L    O2 Sat, Willian 96 Not established %    Carboxyhemoglobin 1.5 0.0 - 1.5 %    MetHgb, Willian 0.5 0.0 - 1.5 %    TC02 (Calc), Willian 28 mmol/L    Hemoglobin, Willian, Reduced 3.80 %   Lactate, plasma   Result Value Ref Range    Lactic Acid 1.8 0.4 - 2.0 mmol/L   Urinalysis, reflex to microscopic   Result Value Ref Range    Color, UA Yellow Straw/Yellow    Clarity, UA Clear Clear    Glucose, Ur Negative Negative mg/dL    Bilirubin Urine Negative Negative    Ketones, Urine Negative Negative mg/dL    Specific Gravity, UA <=1.005 1.005 - 1.030    Blood, Urine Negative Negative    pH, UA 6.5 5.0 - 8.0    Protein, UA Negative Negative mg/dL    Urobilinogen, Urine 0.2 <2.0 E.U./dL    Nitrite, Urine Negative Negative    Leukocyte Esterase, Urine Negative Negative    Microscopic Examination Not Indicated     Urine Type Voided    Hepatic Function Panel   Result Value Ref Range    Total Protein 8.5 (H) 6.4 - 8.2 g/dL    Albumin 2.4 (L) 3.4 - 5.0 g/dL    Alkaline Phosphatase 242 (H) 40 - 129 U/L     (H) 10 - 40 U/L     (H) 15 - 37 U/L    Total Bilirubin 6.0 (H) 0.0 - 1.0 mg/dL    Bilirubin, Direct 4.6 (H) 0.0 - 0.3 mg/dL Bilirubin, Indirect 1.4 (H) 0.0 - 1.0 mg/dL   EKG 12 Lead   Result Value Ref Range    Ventricular Rate 91 BPM    Atrial Rate 91 BPM    P-R Interval 174 ms    QRS Duration 74 ms    Q-T Interval 380 ms    QTc Calculation (Bazett) 467 ms    P Axis 14 degrees    R Axis 40 degrees    T Axis 38 degrees    Diagnosis       EKG performed in ER and to be interpreted by ER physician. Confirmed by MD, ER ([id number]),  Ariadna Dasilva ([phone]) on 7/13/2021 7:21:11 PM       ED BEDSIDE ULTRASOUND:      RECENT VITALS:  BP: 128/78,Temp: 97.9 °F (36.6 °C), Pulse: 103, Resp: 25, SpO2: 100 %     Procedures         ED Course     Nursing Notes, Past Medical Hx, Past Surgical Hx, Social Hx,Allergies, and Family Hx were reviewed. patient was given the following medications:  Orders Placed This Encounter   Medications    lactated ringers infusion 1,000 mL       CONSULTS:  [facility] DECISIONMAKING / ASSESSMENT / Dannielle Coty is a 77 y.o. male with elevation in bilirubin and transaminases and hyponatremia. Spoke to the patient's pulmonologist who is concerned the patient may be developing hepatitis as a result of treatment of the TB. He also does not appear to have any meaningful improvement in his sodium in the last couple of days, though he says it clinically he is feeling better and his nausea is improved today. He does not appear toxic. He has no obvious EKG changes. Given his lab patient continues to worsen by a degree, we think it is most were for the patient be admitted the hospital for further treatment evaluation. He says he did take the TB medicine today. Clinical Impression     1. Hyponatremia    2. Transaminitis        Disposition     PATIENT REFERRED TO:  No follow-up provider specified.     DISCHARGE MEDICATIONS:  New Prescriptions    No medications on file       DISPOSITION Decision To Admit 07/13/2021 09:02:44 PM          Kamran Carranza MD  07/13/21 8407

## 2021-07-14 NOTE — FLOWSHEET NOTE
07/14/21 1326   Encounter Summary   Services provided to: Patient  (by phone )   Referral/Consult From: Nurse  Lidia Ho, NESHA)   Continue Visiting   (7/14, feliz   )   Complexity of Encounter High   Length of Encounter 30 minutes   Routine   Type Initial   Assessment Calm; Approachable   Intervention Active listening;Explored feelings, thoughts, concerns   Outcome Comfort;Expressed gratitude   Primary Decision Maker (Healthcare Proxy)   [address] is: Legal Next of Kin  (has had HCPOA docs delivered.  Will consider. )   Staff Kirstin Hooper MA

## 2021-07-14 NOTE — H&P
Internal Medicine  PGY 1  History & Physical      CC      History Obtained From:  patient    HISTORY OF PRESENT ILLNESS:  An LYNNE Is a 76 y/o male with a PMH of idiopathic pulmonary fibrosis, chronic respiratory failure with hypoxia, pneumonia of left upper lobe due to infectious organism, RLL osteomyelitis s/p amputation, pulmonary tuberculosis who is admitted for hypokalemia and transamintits. Mr. Thad Brody is a current patient of Janet Ashton MD who is treating him for tuberculosis. Patient was recently hospitalized on (5/21-5/23) for pneumothorax secondary to IR RUY nodule biopsy, lung biopsy found \"small foci of noncaseating granulomatous inflammation focally with features of foreign body giant cell reaction with focal necrosis in adjacent parenchyma. \" The patient had a bronchoscopy on 6/23 with negative Acid Fast Bacillus culture not growing. Other cultures pending and was referred to [facility] for presumed TB due to cough, SOB, wt loss. The patient was then started on  on rifampin, isoniazid, pyrazinamide, and ethambutol. Since starting these medications, the patient reports a nausea with emesis, decreased PO intake,  increased urinary frequency, constipation, and new ruq abdominal pain. On 7/11 patient presented to [facility] Department with these symptoms of abdominal pain, anorexia, nausea and vomiting. During workup, the patient was found to have a sodium of 123 mg/dl, chloride of 88, and elevated transaminases; alkaline phosphatase 288, , . A CT chest abdomen pelvis showed lung findings consistent with patient's diagnosis of TB, and no acute findings in the abdomen and pelvis. The patient improved symptomatically after IV fluids and was discharged home from the ED. Today presented today for decreased oral intake and to evaluate elevated transaminases and low sodium which was noted several days ago.  Patient is on day 12 of rifampin, isoniazid, pyrazinamide, and ethambutol. He has no right upper quadrant abdominal pain. The patient reports that he has increased his intake of water because of his \"dry throat\". He is continuing to have difficulty with PO intake of food. He endorses of 10 lb weight loss in the last 10 days. He also reports constipation. His last bowel movement was today at 1500. He is on 2 L of oxygen at home and denies shortness of breath or increased oxygen requirements. Past Medical History:        Diagnosis Date    Amputation of leg below knee, right, traumatic, complicated, subsequent encounter     MTB (Mycobacterium tuberculosis infection) 06/23/2021    Pulmonary fibrosis ([facility] [address]        Past Surgical History:        Procedure Laterality Date    BRONCHOSCOPY N/A 6/23/2021    BRONCHOSCOPY BRONCHOALVEOLAR LAVAGE performed by Ling Begum MD at [address] [facility]  6/23/2021    BRONCHOSCOPY BRUSHINGS performed by Ling Begum MD at [facility]  5/21/2021    CT GUIDED CHEST TUBE 5/21/2021 [address] SCAN    CT NEEDLE BIOPSY LUNG PERCUTANEOUS  5/21/2021    CT NEEDLE BIOPSY LUNG PERCUTANEOUS 5/21/2021 [address] SCAN       Medications Priorto Admission:    Not in a hospital admission. Allergies:  Patient has no known allergies. Social History:   TOBACCO:   reports that he has never smoked. He has never used smokeless tobacco.  ETOH:   reports current alcohol use of about 1.0 standard drinks of alcohol per week. DRUGS : denies drug use  Patient currently lives with family wife    Family History:   History reviewed. No pertinent family history. Review of Systems   Constitutional: Positive for appetite change and unexpected weight change. Negative for chills, diaphoresis and fever. Respiratory: Positive for cough. Negative for shortness of breath. Cardiovascular: Negative for chest pain, palpitations and leg swelling.    Gastrointestinal: Positive for constipation, nausea and vomiting. Negative for abdominal distention and abdominal pain. Endocrine: Negative for cold intolerance and heat intolerance. Genitourinary: Positive for frequency. Negative for dysuria, flank pain and hematuria. Musculoskeletal: Negative for arthralgias and joint swelling. Skin: Negative for color change, pallor, rash and wound. Neurological: Negative for seizures, syncope, speech difficulty and headaches. Hematological: Negative for adenopathy. Does not bruise/bleed easily. ROS: A 10 point review of systems was conducted, significant findings as noted in HPI. Physical Exam  Constitutional:       Appearance: Normal appearance. HENT:      Head: Normocephalic and atraumatic. Eyes:      Extraocular Movements: Extraocular movements intact. Pupils: Pupils are equal, round, and reactive to light. Cardiovascular:      Rate and Rhythm: Normal rate and regular rhythm. Pulses: Normal pulses. Heart sounds: Normal heart sounds. Pulmonary:      Effort: Pulmonary effort is normal.      Breath sounds: Normal breath sounds. Abdominal:      General: Abdomen is flat. Bowel sounds are normal.      Palpations: Abdomen is soft. Neurological:      General: No focal deficit present. Mental Status: He is alert and oriented to person, place, and time. Psychiatric:         Mood and Affect: Mood normal.         Behavior: Behavior normal.         Thought Content:  Thought content normal.          Vitals:    07/13/21 2300   BP: 128/80   Pulse: 98   Resp: 25   Temp:    SpO2: 100%       DATA:    Labs:  CBC:   Recent Labs     07/11/21 1909 07/13/21 1915   WBC 11.8* 9.7   HGB 10.8* 10.7*   HCT 32.8* 31.7*   * 407       BMP:   Recent Labs     07/11/21 1909 07/13/21 1915   * 122*   K 4.3 3.6   CL 88* 88*   CO2 25 23   BUN 12 11   CREATININE 0.6* 0.7*   GLUCOSE 101* 88     LFT's:   Recent Labs     07/11/21 1909 07/13/21 1915   * 102*   * 101*   BILITOT 4.6* 6.0*   ALKPHOS 288* 242*     Troponin:   Recent Labs     07/13/21 1915   TROPONINI <0.01     BNP:No results for input(s): BNP in the last 72 hours. ABGs: No results for input(s): PHART, BWQ4UGD, PO2ART in the last 72 hours. INR: No results for input(s): INR in the last 72 hours. U/A:  Recent Labs     07/13/21 2036   COLORU Yellow   PHUR 6.5   CLARITYU Clear   SPECGRAV <=1.005   LEUKOCYTESUR Negative   UROBILINOGEN 0.2   BILIRUBINUR Negative   BLOODU Negative   GLUCOSEU Negative       No orders to display       ASSESSMENT AND PLAN:  #hyponatremia   -Na - 127 after 1l LR, hypovolemia because improved with bolus  -Serum Osmol - 275, Urine Osmol - 295, Urine Na - 71  -ADH on, Aldosterone off, and renal losses bc urine Na > 20. #transaminitis  -Discontinue rifampin, isoniazid, pyrazinamide, ethambutol  -Discontinue pirfenidone (esbriet) for pulmonary fibrosis  -ALT, AST, Alk phos trending downward  -CT chest abdomen showed no acute abdominal pathology or obstruction on 7/11  -RUQ ultrasound for cause of hyperbilirubinemia    #tuberculosis  -airborn isolation, contact and droplet isolation  -consult ID because going off TB antibiotic therapy per telephone encounter with Jeny HARDY    #normocytic anemia  -Hgb stable from 2 days ago 10.7 on 7/14, 10.8 on 7/11.   -follow CBC daily  -Ordered iron studies  -Last colonoscopy on 2019 showed no bleed    #malnutrition  -consult Dietician    #chronic respiratory failure with hypoxia  -2 liter nasal canula  -Restrictive lung disease with last PFTs  -High resolution CT performed on 6/07/2021    Will discuss with attending physician Dr. Slade Payne    Code Status:Full code  FEN: Normal diet, fluid restriction  PPX: Lovenox, famotidine  DISPO: MIRNA Malone MD  7/13/2021,  11:28 PM    I saw the patient independently from the resident . I discussed the care with the resident. I personally reviewed the HPI, PH, FH, SH, ROS and medications.  I repeated pertinent portions of the examination and reviewed the relevant imaging and laboratory data. I agree with the findings, assessment and plan as documented. addition to: Patient admitted with acute on chronic hyponatremia etiology unclear given urine studies suspicion of renal wasting in the setting of low oral intake. Transaminase unclear etiology suspicion medication induced. Will await pulm and ID consultation. Much appreciated

## 2021-07-15 LAB
ALBUMIN SERPL-MCNC: 2.2 G/DL (ref 3.4–5)
ALP BLD-CCNC: 177 U/L (ref 40–129)
ALT SERPL-CCNC: 79 U/L (ref 10–40)
ANION GAP SERPL CALCULATED.3IONS-SCNC: 8 MMOL/L (ref 3–16)
AST SERPL-CCNC: 95 U/L (ref 15–37)
BASOPHILS ABSOLUTE: 0 K/UL (ref 0–0.2)
BASOPHILS RELATIVE PERCENT: 0.7 %
BILIRUB SERPL-MCNC: 4 MG/DL (ref 0–1)
BILIRUBIN DIRECT: 2.7 MG/DL (ref 0–0.3)
BILIRUBIN, INDIRECT: 1.3 MG/DL (ref 0–1)
BUN BLDV-MCNC: 12 MG/DL (ref 7–20)
CALCIUM SERPL-MCNC: 7.9 MG/DL (ref 8.3–10.6)
CHLORIDE BLD-SCNC: 96 MMOL/L (ref 99–110)
CO2: 24 MMOL/L (ref 21–32)
CREAT SERPL-MCNC: 0.7 MG/DL (ref 0.8–1.3)
EOSINOPHILS ABSOLUTE: 0 K/UL (ref 0–0.6)
EOSINOPHILS RELATIVE PERCENT: 0.2 %
GFR AFRICAN AMERICAN: >60
GFR NON-AFRICAN AMERICAN: >60
GLUCOSE BLD-MCNC: 76 MG/DL (ref 70–99)
HCT VFR BLD CALC: 30.4 % (ref 40.5–52.5)
HEMOGLOBIN: 10 G/DL (ref 13.5–17.5)
LYMPHOCYTES ABSOLUTE: 1 K/UL (ref 1–5.1)
LYMPHOCYTES RELATIVE PERCENT: 14.2 %
MCH RBC QN AUTO: 31.6 PG (ref 26–34)
MCHC RBC AUTO-ENTMCNC: 32.8 G/DL (ref 31–36)
MCV RBC AUTO: 96.3 FL (ref 80–100)
MONOCYTES ABSOLUTE: 1.2 K/UL (ref 0–1.3)
MONOCYTES RELATIVE PERCENT: 17.5 %
NEUTROPHILS ABSOLUTE: 4.7 K/UL (ref 1.7–7.7)
NEUTROPHILS RELATIVE PERCENT: 67.4 %
PDW BLD-RTO: 17.1 % (ref 12.4–15.4)
PLATELET # BLD: 337 K/UL (ref 135–450)
PMV BLD AUTO: 7.1 FL (ref 5–10.5)
POTASSIUM REFLEX MAGNESIUM: 4 MMOL/L (ref 3.5–5.1)
RBC # BLD: 3.15 M/UL (ref 4.2–5.9)
SODIUM BLD-SCNC: 128 MMOL/L (ref 136–145)
TOTAL PROTEIN: 7.7 G/DL (ref 6.4–8.2)
WBC # BLD: 7 K/UL (ref 4–11)

## 2021-07-15 PROCEDURE — 1200000000 HC SEMI PRIVATE

## 2021-07-15 PROCEDURE — 6370000000 HC RX 637 (ALT 250 FOR IP): Performed by: STUDENT IN AN ORGANIZED HEALTH CARE EDUCATION/TRAINING PROGRAM

## 2021-07-15 PROCEDURE — 36415 COLL VENOUS BLD VENIPUNCTURE: CPT

## 2021-07-15 PROCEDURE — 6370000000 HC RX 637 (ALT 250 FOR IP): Performed by: INTERNAL MEDICINE

## 2021-07-15 PROCEDURE — 80076 HEPATIC FUNCTION PANEL: CPT

## 2021-07-15 PROCEDURE — 6360000002 HC RX W HCPCS: Performed by: INTERNAL MEDICINE

## 2021-07-15 PROCEDURE — 80048 BASIC METABOLIC PNL TOTAL CA: CPT

## 2021-07-15 PROCEDURE — 99233 SBSQ HOSP IP/OBS HIGH 50: CPT | Performed by: INTERNAL MEDICINE

## 2021-07-15 PROCEDURE — 85025 COMPLETE CBC W/AUTO DIFF WBC: CPT

## 2021-07-15 PROCEDURE — 6360000002 HC RX W HCPCS: Performed by: STUDENT IN AN ORGANIZED HEALTH CARE EDUCATION/TRAINING PROGRAM

## 2021-07-15 PROCEDURE — 2580000003 HC RX 258: Performed by: INTERNAL MEDICINE

## 2021-07-15 PROCEDURE — 2580000003 HC RX 258: Performed by: STUDENT IN AN ORGANIZED HEALTH CARE EDUCATION/TRAINING PROGRAM

## 2021-07-15 PROCEDURE — 99222 1ST HOSP IP/OBS MODERATE 55: CPT | Performed by: INTERNAL MEDICINE

## 2021-07-15 RX ORDER — METOCLOPRAMIDE 5 MG/1
10 TABLET ORAL
Status: DISCONTINUED | OUTPATIENT
Start: 2021-07-15 | End: 2021-07-16 | Stop reason: HOSPADM

## 2021-07-15 RX ORDER — ETHAMBUTOL HYDROCHLORIDE 400 MG/1
1200 TABLET, FILM COATED ORAL DAILY
Status: DISCONTINUED | OUTPATIENT
Start: 2021-07-15 | End: 2021-07-16 | Stop reason: HOSPADM

## 2021-07-15 RX ADMIN — Medication 10 ML: at 09:25

## 2021-07-15 RX ADMIN — RIFAMPIN 600 MG: 300 CAPSULE ORAL at 13:42

## 2021-07-15 RX ADMIN — SODIUM CHLORIDE: 9 INJECTION, SOLUTION INTRAVENOUS at 02:37

## 2021-07-15 RX ADMIN — ETHAMBUTOL HYDROCHLORIDE 1200 MG: 400 TABLET, FILM COATED ORAL at 13:42

## 2021-07-15 RX ADMIN — METOCLOPRAMIDE HYDROCHLORIDE 10 MG: 5 INJECTION INTRAMUSCULAR; INTRAVENOUS at 02:12

## 2021-07-15 RX ADMIN — METOCLOPRAMIDE HYDROCHLORIDE 10 MG: 5 TABLET ORAL at 20:57

## 2021-07-15 RX ADMIN — FAMOTIDINE 20 MG: 20 TABLET ORAL at 20:57

## 2021-07-15 RX ADMIN — METOCLOPRAMIDE HYDROCHLORIDE 10 MG: 5 TABLET ORAL at 14:32

## 2021-07-15 RX ADMIN — METOCLOPRAMIDE HYDROCHLORIDE 10 MG: 5 INJECTION INTRAMUSCULAR; INTRAVENOUS at 09:18

## 2021-07-15 RX ADMIN — ENOXAPARIN SODIUM 40 MG: 40 INJECTION SUBCUTANEOUS at 09:18

## 2021-07-15 RX ADMIN — FAMOTIDINE 20 MG: 20 TABLET ORAL at 09:19

## 2021-07-15 ASSESSMENT — PAIN SCALES - GENERAL
PAINLEVEL_OUTOF10: 0

## 2021-07-15 ASSESSMENT — ENCOUNTER SYMPTOMS
NAUSEA: 0
CONSTIPATION: 0
VOMITING: 0
CHEST TIGHTNESS: 0
DIARRHEA: 0
SHORTNESS OF BREATH: 0
COUGH: 0
BACK PAIN: 0
ABDOMINAL PAIN: 0

## 2021-07-15 NOTE — PLAN OF CARE
Problem: Respiratory  Goal: No pulmonary complications  Outcome: Met This Shift   Pulmonary status unchanged from previous shift; bibasilar fine crackles auscultated, otherwise clear. pt has frequent dry non-productive cough, on home-dose of 2L O2 with Sp02 >97% throughout shift. Problem: Pain:  Goal: Pain level will decrease  Description: Pain level will decrease  Outcome: Met This Shift   Pt c/o gas/heartburn; relieved with scheduled pepcid; 10mg reglan IV administered as scheduled; free from further abdominal pain/nausea this shift. Problem: Falls - Risk of:  Goal: Will remain free from falls  Description: Will remain free from falls  Outcome: Met This Shift   Pt ambulated freely with prosthetic following set-up; calls out appropriately; no falls this shift.

## 2021-07-15 NOTE — CONSULTS
NUTRITION NOTE   Admission Date: 7/13/2021     Type and Reason for Visit: Initial, Positive Nutrition Screen    NUTRITION RECOMMENDATIONS:   1. PO Diet: Continue current diet. 2. ONS: Adding plant-based ONS BID    RD did not conduct direct, in-person nutrition evaluation in efforts to reduce exposure and use of PPE for high risk persons, PUI persons, patients who have tested positive for TB. EMR was screened for nutrition risk factors, as defined per nutrition standards of care. Nutrition history and assessment obtained through RD EMR review and RN report. NUTRITION ASSESSMENT:  Nutrition eval triggered for positive nutrition screen with risk of malnutrition. RD MORIS directly d/t current airborne isoliatino precauations. Will add ONS - plant based and monitor pt PO intake through admission and RN reports. Patient admitted d/t hypokalemia and transamintits, as well as poor po intake w/n/v and constipation through recent hospitalizations and ED visits.      PMH significant for: idiopathic pulmonary fibrosis, chronic respiratory failure with hypoxia, pneumonia of left upper lobe due to infectious organism, RLL osteomyelitis s/p amputation, pulmonary tuberculosis     MALNUTRITION ASSESSMENT  Context of Malnutrition: Acute Illness   Malnutrition Status: Insufficient data    NUTRITION DIAGNOSIS  Problem: Problem #1: Predicted inadequate energy intake  Etiology: Acute or chronic injury or trauma  Signs & Symptoms: Diet history of poor intake  and Nausea    NUTRITION MONITORING & EVALUATION:  Evaluation:Goals set   Goals: pt will tolerate PO diet to consume greater than 75% of meals and supplements offered through admission  Monitoring: Diet Tolerance , I/O, Meal Intake , Nausea , Pertinent Labs  or Weight      ANTHROPOMETRICS  Current Height: 5' 7\" (170.2 cm)  Current Weight: 133 lb 13.1 oz (60.7 kg)    Admission weight: 135 lb (61.2 kg)  Ideal Body Weight (lbs) (Calculated): 148 lbs (Ideal Body Weight (Kg)

## 2021-07-15 NOTE — PROGRESS NOTES
Pulmonary Progress Note    Date:7/15/2021       [id number]  Patient Jacinta Morton     YOB: 1955     Age:71 y.o. Subjective   Interval History Status: significantly improved. Shelli César. No acute complaints today AM. Nausea has completely resolved, ate 100% of last three meals. Good BM, voiding as usual. Endorsing appetite. Following fluid restriction. No abdominal pain. Ambulating at baseline. On 2L home O2 without SOB. Wants to go home    Medications   Scheduled Meds:    famotidine  20 mg Oral BID    sodium chloride flush  5-40 mL Intravenous 2 times per day    enoxaparin  40 mg Subcutaneous Daily    metoclopramide  10 mg Intravenous Q6H     Continuous Infusions:    sodium chloride      sodium chloride 100 mL/hr at 07/15/21 0237     PRN Meds: sodium chloride flush, sodium chloride, ondansetron **OR** ondansetron, polyethylene glycol, acetaminophen **OR** acetaminophen    Physical Examination      Vitals:  /64   Pulse 82   Temp 97.7 °F (36.5 °C) (Oral)   Resp 16   Ht 5' 7\" (1.702 m)   Wt 133 lb 13.1 oz (60.7 kg)   SpO2 98%   BMI 20.96 kg/m²   Temp (24hrs), Av.1 °F (36.7 °C), Min:97.7 °F (36.5 °C), Max:98.6 °F (37 °C)      I/O (24Hr): Intake/Output Summary (Last 24 hours) at 7/15/2021 1025  Last data filed at 7/15/2021 0310  Gross per 24 hour   Intake 120 ml   Output 875 ml   Net -755 ml     CONSTITUTIONAL:  awake, alert, cooperative, no apparent distress, and appears stated age  LUNGS:  No increased work of breathing, insp and exp wheezes and ronchi present in upper lung fields, diminished lung sounds  CARDIOVASCULAR:  Regular rate and rhythm, normal S1 and S2, and no murmur noted  ABDOMEN:  normal bowel sounds, soft, non-distended, non-tender, no masses palpated  NEUROLOGIC:  Awake, alert, oriented to name, place and time.     Labs/Imaging/Diagnostics   Labs:  CBC:  Recent Labs     21  1915 21  0515 07/15/21  0502   WBC 9.7 8.4 7.0   RBC 3.30* 3.17* 3.15*   HGB 10.7* 10.2* 10.0*   HCT 31.7* 29.9* 30.4*   MCV 96.3 94.2 96.3   RDW 16.8* 16.8* 17.1*    353 337     CHEMISTRIES:  Recent Labs     07/13/21 1915 07/14/21  0201 07/14/21  0515 07/14/21  1049 07/15/21  0502   *   < > 127* 127* 128*   K 3.6  --  3.9  --  4.0   CL 88*  --  93*  --  96*   CO2 23  --  25  --  24   BUN 11  --  12  --  12   CREATININE 0.7*  --  0.7*  --  0.7*   GLUCOSE 88  --  101*  --  76    < > = values in this interval not displayed. PT/INR:No results for input(s): PROTIME, INR in the last 72 hours. APTT:No results for input(s): APTT in the last 72 hours. LIVER PROFILE:  Recent Labs     07/13/21 1915 07/14/21  0515 07/15/21  0502   * 91* 95*   * 89* 79*   BILIDIR 4.6* 5.1* 2.7*   BILITOT 6.0* 6.6* 4.0*   ALKPHOS 242* 190* 177*       Imaging Last 24 Hours:  US ABDOMEN LIMITED Specify organ? GALLBLADDER, LIVER, PANCREAS    Result Date: 7/14/2021  Right upper quadrant ultrasound. HISTORY: Hyperbilirubinemia. COMPARISON: CT abdomen July 11, 2021. Diffuse fatty infiltration of the liver. No focal lesions Gallbladder demonstrates no gallstones. No wall thickening. Common bile duct is normal and measures 3 mm Visualized pancreas is unremarkable. The pancreatic tail is obscured by bowel gas Right kidney measures 10 cm. No hydronephrosis No free fluid Portal vein patent. Fatty infiltration of the liver No gallstones No free fluid.     Assessment        Hospital Problems         Last Modified POA    IPF (idiopathic pulmonary fibrosis) ([facility] [address] 7/14/2021 Yes    Chronic respiratory failure with hypoxia ([facility] [address] 7/14/2021 Yes    Moderate malnutrition ([facility]CC) (Chronic) 7/14/2021 Yes    Pulmonary tuberculosis 7/13/2021 Yes    Hyponatremia 7/14/2021 Yes    Transaminitis 7/14/2021 Yes        Plan:        Transaminitis with elevated bili and alkphos, 2/2 TB tx, improving  - RUQ US without gallstones  - Continue holding TB meds  - Daily LFT  - ID onboard  - Given the rapid progression of cavitation in CT chest, patient will need to be on TB tx, likely second line agents which are less hepatotoxic. Will discuss with ID to formulate a tx regimen     TB  - Completed 12 days of RIPE, managed by [facility]  - Elevated LFT, will hold TB tx for now  - Daily LFT  - Pt does not have any cough, sputum, abdominal pain. Breathing on 2L     Hyponatremia likely from polydipsia, asymptomatic, resolving  - c/w strict fluid restriction. Encourage PO intake     Idiopathic pulm fibrosis, stable  - pt has prescription for Esbriet. Has not received the medication as there was some issue with the forms   - No new O2 requirement     Will discuss with Dr. Kiko Wolf     Electronically signed by Richy Shahid, PGY2 on 7/15/21 at 10:25 AM EDT    Patient examined, findings as discussed with Dr. Niya Low. Agree with assessment and plan. Treatment of pulmonary fibrosis is on hold, particularly because of the high incidence of GI side effects. His greatest need at this time is to be able to take antituberculous medication. Hepatitis due to medication is improving rapidly. I have discussed with Dr. Liza Fernandez the plan for reintroducing antituberculous medication.

## 2021-07-15 NOTE — DISCHARGE SUMMARY
INTERNAL MEDICINE DEPARTMENT AT [address] [place] [address]  DISCHARGE SUMMARY    Patient ID: Feng Major                                             Discharge Date: 7/16/2021   Patient's PCP: Austen Bryant DO                                          Discharge Physician: Corinne Hopkins, MD   Admit Date: 7/13/2021   Admitting Physician: Lily Roach DO    PROBLEMS DURING HOSPITALIZATION    DISCHARGE DIAGNOSES:  Present on Admission:  - Drug Induced Hepatitis  - Pulmonary tuberculosis  - Chronic respiratory failure with hypoxia  - Hyponatremia    The following issues were addressed during hospitalization:    Dot Whitfield Is a 78 y/o male with a PMH of idiopathic pulmonary fibrosis, chronic respiratory failure with hypoxia, pneumonia of left upper lobe due to infectious organism, RLL osteomyelitis s/p amputation, pulmonary TB who is admitted for hypokalemia, weakness, nausea and transamintits since started his RIPE therapy. Mr. Verna Camarena is a current patient of Dr. Diana Valdez who was being treated for TB. He was being followed by [facility]. Patient diagnosed with drug induced hepatitis and hyponatremia. On presentation, patient was hemodynamically stable. On labs he was hyponatremic, with elevated transaminases, leukocytosis and anemic. Urine studies didn't show signs of infection. Trops were negative and pro-BNP was mildly elevated. RUQ was obtained which showed fatty liver infiltrate but no gallstones. CT scan showed poorly defined density with associated cavitation in the left upper lobe and apex consistent with his diagnosis of TB. Pulm and ID were consulted for this patient and they recommended not continuing his RIPE therapy inpatient to allow for improvement of his LFTs. His LFTs improved with holding of his RIPE therapy and home pirfenidone. His hyponatremia improved with fluids.  Patient remained on home oxygen requirement inpatient and was not having cranial nerve deficit. Sensory: No sensory deficit. Motor: No weakness. Psychiatric:         Mood and Affect: Mood normal.         Behavior: Behavior normal.         Thought Content:  Thought content normal.         Judgment: Judgment normal.          Consults: pulmonary/intensive care and ID  Significant Diagnostic Studies:  ultrasound abdomen, CXR and CT abdomen, pelvis  Treatments: IV hydration  Disposition: home  Discharged Condition: Stable  Follow Up: Primary Care Physician in one week    DISCHARGE MEDICATION:     Medication List      START taking these medications    metoclopramide 10 MG tablet  Commonly known as: REGLAN  Take 1 tablet by mouth 4 times daily as needed (nausea)     ondansetron 4 MG disintegrating tablet  Commonly known as: ZOFRAN-ODT  Take 1 tablet by mouth every 8 hours as needed for Nausea or Vomiting        CONTINUE taking these medications    BIOTIN PO     ethambutol 400 MG tablet  Commonly known as: MYAMBUTOL  Take 3 tablets by mouth daily  Start taking on: July 17, 2021     famotidine 20 MG tablet  Commonly known as: PEPCID  Take 1 tablet by mouth 2 times daily     ondansetron 4 MG tablet  Commonly known as: ZOFRAN  Take 1 tablet by mouth daily as needed for Nausea or Vomiting     rifAMPin 300 MG capsule  Commonly known as: RIFADIN  Take 2 capsules by mouth daily  Start taking on: July 17, 2021     [facility] Pack  Take 1 Package by mouth 3 times daily     sertraline 25 MG tablet  Commonly known as: ZOLOFT  Take 1 tablet by mouth daily     vitamin B-6 50 MG tablet  Commonly known as: PYRIDOXINE     VITAMIN D3 PO        STOP taking these medications    Esbriet 267 MG Tabs  Generic drug: Pirfenidone     isoniazid 300 MG tablet  Commonly known as: NYDRAZID     omeprazole 40 MG delayed release capsule  Commonly known as: PRILOSEC     pyrazinamide 500 MG tablet           Where to Get Your Medications      These medications were sent to [facility] [address] [place] [address], [address] [place] [address]

## 2021-07-15 NOTE — PLAN OF CARE
Problem: Respiratory  Goal: No pulmonary complications  6/74/1196 1002 by Ashley Rivera RN  Outcome: Ongoing  7/15/2021 0608 by Cherie Villarreal RN  Outcome: Met This Shift     Problem: Nutritional:  Goal: Nutritional status will improve  Description: Nutritional status will improve  Outcome: Ongoing

## 2021-07-15 NOTE — CONSULTS
Infectious Diseases   Consult Note      Reason for Consult:  pulm TB   Requesting Physician:  Dr. Enrique Boone      Date of Admission: 7/13/2021  Subjective:   CHIEF COMPLAINT:  None given       HPI:    Mr. 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 Path with non-caseating granulomas. Procedure complicated by ptx. Bronch 6/23/21 was AFB smear + and MTB probe +  Culture not yet final   Through [facility] he was started on standard 4 drug therapy on or around 7/1/21    Admitted 7/13/21 with N/V, anorexia. Symptoms started maybe 1-2d after starting RIPE, and escalated. No fever, rash. On admission, noted to have cholestatic hepatitis. Imaging (US, CT), no biliary obstruction noted. He has been AF  Feeling much better today, tolerating regular diet. LFTs coming down. Current abx:  none       Past Surgical History:       Diagnosis Date    Amputation of leg below knee, right, traumatic, complicated, subsequent encounter     MTB (Mycobacterium tuberculosis infection) 06/23/2021    Pulmonary fibrosis ([address]          Procedure Laterality Date    BRONCHOSCOPY N/A 6/23/2021    BRONCHOSCOPY BRONCHOALVEOLAR LAVAGE performed by Allen Toscano MD at [address]  6/23/2021    BRONCHOSCOPY BRUSHINGS performed by Allen Toscano MD at [address]  5/21/2021    CT GUIDED CHEST TUBE 5/21/2021 [address] SCAN    CT NEEDLE BIOPSY LUNG PERCUTANEOUS  5/21/2021    CT NEEDLE BIOPSY LUNG PERCUTANEOUS 5/21/2021 [address] SCAN       Social History:    TOBACCO:   reports that he has never smoked. He has never used smokeless tobacco.  ETOH:   reports current alcohol use of about 1.0 standard drinks of alcohol per week. Family History:   History reviewed.  No pertinent family history. There is no family history of autoimmune diseases or significant infectious diseases.       Current Medications:    Current Facility-Administered Medications: rifAMPin (RIFADIN) capsule 600 mg, 600 mg, Oral, Daily  ethambutol (MYAMBUTOL) tablet 1,200 mg, 1,200 mg, Oral, Daily  metoclopramide (REGLAN) tablet 10 mg, 10 mg, Oral, 4x Daily AC & HS  famotidine (PEPCID) tablet 20 mg, 20 mg, Oral, BID  sodium chloride flush 0.9 % injection 5-40 mL, 5-40 mL, Intravenous, 2 times per day  sodium chloride flush 0.9 % injection 5-40 mL, 5-40 mL, Intravenous, PRN  0.9 % sodium chloride infusion, 25 mL, Intravenous, PRN  enoxaparin (LOVENOX) injection 40 mg, 40 mg, Subcutaneous, Daily  ondansetron (ZOFRAN-ODT) disintegrating tablet 4 mg, 4 mg, Oral, Q8H PRN **OR** ondansetron (ZOFRAN) injection 4 mg, 4 mg, Intravenous, Q6H PRN  polyethylene glycol (GLYCOLAX) packet 17 g, 17 g, Oral, Daily PRN  acetaminophen (TYLENOL) tablet 650 mg, 650 mg, Oral, Q6H PRN **OR** acetaminophen (TYLENOL) suppository 650 mg, 650 mg, Rectal, Q6H PRN  0.9 % sodium chloride infusion, , Intravenous, Continuous  Facility-Administered Medications Ordered in Other Encounters: oxyCODONE-acetaminophen (PERCOCET) 5-325 MG per tablet 1 tablet, 1 tablet, Oral, Q8H PRN  benzonatate (TESSALON) capsule 100 mg, 100 mg, Oral, TID PRN  guaiFENesin-codeine (GUAIFENESIN AC) 100-10 MG/5ML liquid 5 mL, 5 mL, Oral, Q4H PRN      No Known Allergies       REVIEW OF SYSTEMS:    CONSTITUTIONAL:   Per HPI   EYES:  negative for blurred vision, eye discharge, visual disturbance and icterus  HEENT:  negative for hearing loss, tinnitus, ear drainage, sinus pressure, nasal congestion, epistaxis and snoring  RESPIRATORY:   Cough improved in the last few weeks  He denies hemoptysis  CARDIOVASCULAR:  negative for chest pain, palpitations, exertional chest pressure/discomfort, edema, syncope  GASTROINTESTINAL:   Per HPI   GENITOURINARY:  negative for frequency, dysuria, urinary incontinence, decreased urine volume, and hematuria  HEMATOLOGIC/LYMPHATIC:  negative for easy bruising, bleeding and lymphadenopathy  ALLERGIC/IMMUNOLOGIC:  negative for recurrent infections, angioedema, anaphylaxis and drug reactions  ENDOCRINE:  negative for weight changes and diabetic symptoms including polyuria, polydipsia and polyphagia  MUSCULOSKELETAL:  negative for acute joint swelling, decreased range of motion and muscle weakness  NEUROLOGICAL:  negative for headaches, slurred speech, unilateral weakness  PSYCHIATRIC/BEHAVIORAL: negative for hallucinations, behavioral problems, confusion and agitation. Objective:   PHYSICAL EXAM:      VITALS:  /74   Pulse 85   Temp 98.2 °F (36.8 °C) (Oral)   Resp 16   Ht 5' 7\" (1.702 m)   Wt 133 lb 13.1 oz (60.7 kg)   SpO2 98%   BMI 20.96 kg/m²      24HR INTAKE/OUTPUT:      Intake/Output Summary (Last 24 hours) at 7/15/2021 1512  Last data filed at 7/15/2021 0310  Gross per 24 hour   Intake 120 ml   Output 875 ml   Net -755 ml     CONSTITUTIONAL:  Awake, alert, cooperative, no apparent distress, and appears stated age  Thin   HEENT: NCAT, PERRL, EOMI. Sclera white, conjunctiva full. OP with moist mucosal membranes, no thrush, tongue protrudes midline  NECK:  Supple, symmetrical, trachea midline, no adenopathy  LUNGS:  no increased work of breathing. Rales deysi   CARDIOVASCULAR:  RRR without murmur   ABDOMEN:  normal bowel sounds, soft, flat, NT   No HSM   PSYCHIATRIC: Oriented to person place and time. No obvious depression or anxiety. MUSCULOSKELETAL: No obvious misalignment or effusion of the joints. No clubbing, cyanosis of the digits.   SKIN:   +clubbing   No focal rash  NEUROLOGIC: nonfocal exam  ACCESS:   IV site ok       DATA:    Old records have been reviewed    CBC:  Recent Labs     07/13/21  1915 07/14/21  0515 07/15/21  0502   WBC 9.7 8.4 7.0   RBC 3.30* 3.17* 3.15*   HGB 10.7* 10.2* 10.0*   HCT 31.7* 29.9* 30.4*    353 337   MCV 96.3 94.2 96.3   MCH 32.4 32.0 31.6   MCHC 33.6 33.9 32.8   RDW 16.8* 16.8* 17.1*      BMP:  Recent Labs     07/13/21  1915 07/14/21  0201 07/14/21  0515 07/14/21  1049 07/15/21  0502   *   < > 127* 127* 128*   K 3.6  --  3.9  --  4.0   CL 88*  --  93*  --  96*   CO2 23  --  25  --  24   BUN 11  --  12  --  12   CREATININE 0.7*  --  0.7*  --  0.7*   CALCIUM 7.9*  --  8.1*  --  7.9*   GLUCOSE 88  --  101*  --  76    < > = values in this interval not displayed. Cultures:   6/23 BAL culture +MTB probe, AFB smear 1+      Radiology Review:  All pertinent images / reports were reviewed as a part of this visit. CT chest 5/7/21  Impression   1. Severe bilateral UIP pattern pulmonary fibrosis increased in severity since September 2020.   2. A 2 cm focal area of nodular airspace consolidation with air bronchogram and mild surrounding groundglass opacity most consistent with a focal area of pneumonia which is new since September 2020. Given this represents a new focal finding, a follow-up    CT of the chest is recommended in 6-8 weeks following treatment for pneumonia to document resolution and exclude other less likely etiologies such as neoplasm. CT CAP 7/11/21  Impression       CHEST:       Evidence of diffuse pulmonary interstitial fibrosis.       Consolidation with bronchiectasis and volume loss in the anteromedial left upper lobe. Poorly defined density with associated cavitation in the left upper lobe and apex. Findings are consistent with patient's diagnosis of TB.       Mild AP window, left hilar or mediastinal adenopathy. This may be reactive in this setting.       No other acute findings in the chest.               ABDOMEN/PELVIS:       Nondistended gallbladder.       No acute findings in the abdomen or pelvis.        Assessment:     Patient Active Problem List   Diagnosis    IPF (idiopathic pulmonary fibrosis) (HCC)    Chronic bilateral low back pain without sciatica    Chronic respiratory failure with hypoxia (HCC)    Pneumothorax after biopsy    Moderate malnutrition (HCC)    Pneumonia of left upper lobe due to infectious organism    Pulmonary tuberculosis    Hyponatremia    Transaminitis       Mr Jose Orellana is a 67yoM who is evaluated for the following:    Pulmonary MTB infection  Smear positive (+BAL culture 6/23/21)  Cavitary disease  Started on standard 4 drug therapy via [facility] 7/1/21    Admitted 7/13/21 with GI side effects, anorexia, N/V and found to have cholestatic hepatitis   CT, US no pathology   Clinically improved after withholding medications. LFTs now trending down c/w DILI     History of pulmonary fibrosis previously on steroids    Chronic hypoxemic respiratory failure     NKDA       Recs:  TB meds were held on admission and he is improving  Clinical picture consistent with DILI   PZA and INH > rif could all be culprit     Will go ahead today and re-introduce meds starting with rif and ethambutol    Would like him to remain inpatient at least overnight to ensure he is tolerating po and that LFTs are stable in AM     Will need close follow-up with [facility] after DC for repeat LFTs and to resume full regimen  I left a VM with Med Dir at TB clinic    Depending on his tolerance of meds in the coming days, he may or may not need alternative drugs including FQ or injectable med  He has had extensive exposure to cipro in the past (though not clear at this time how recently) that might increase risk of FQ resistance   TB sensitivity testing pending     Will verify HIV status negative w TAYLOR       Case d/w patient's son by phone who is Gastro doc in [place] M[place]D. Thank you for the opportunity to participate in the care of your patient.     Please do not hesitate to contact me:   [phone] office

## 2021-07-15 NOTE — PROGRESS NOTES
Progress Note    Admit Date: 7/13/2021  Day: 1  Diet: ADULT DIET; Regular; Vegetarian (Lacto-Ovo)    CC: Labs, hypokalemia and transaminitis    Interval history: NAONE. Patient seen and examined this am. He is laying comfortably in bed, NAD. AOx3. He denies any N/V, abdominal pain, CP, SOB, cough, palpitations, N/V, constipation, diarrhea, urinary symptoms or leg swelling. Tolerating diet. Medications:     Scheduled Meds:   famotidine  20 mg Oral BID    sodium chloride flush  5-40 mL Intravenous 2 times per day    enoxaparin  40 mg Subcutaneous Daily    metoclopramide  10 mg Intravenous Q6H     Continuous Infusions:   sodium chloride      sodium chloride 100 mL/hr at 07/15/21 0237     PRN Meds:sodium chloride flush, sodium chloride, ondansetron **OR** ondansetron, polyethylene glycol, acetaminophen **OR** acetaminophen    Objective:   Vitals:   T-max:  Patient Vitals for the past 8 hrs:   BP Temp Temp src Pulse Resp SpO2   07/15/21 0235 119/74 98.2 °F (36.8 °C) Oral 73 24 100 %   07/14/21 2228 109/66 98.6 °F (37 °C) Oral 94 22 99 %       Intake/Output Summary (Last 24 hours) at 7/15/2021 0610  Last data filed at 7/14/2021 2239  Gross per 24 hour   Intake 120 ml   Output 500 ml   Net -380 ml       Review of Systems   Constitutional: Negative for chills and fever. Respiratory: Negative for cough, chest tightness and shortness of breath. Cardiovascular: Negative for chest pain, palpitations and leg swelling. Gastrointestinal: Negative for abdominal pain, constipation, diarrhea, nausea and vomiting. Genitourinary: Negative for difficulty urinating, dysuria, frequency and urgency. Musculoskeletal: Negative for back pain. Skin: Negative for rash. Neurological: Negative for dizziness and headaches. Physical Exam  Constitutional:       General: He is not in acute distress. Appearance: Normal appearance. HENT:      Head: Normocephalic and atraumatic.       Nose: No congestion or 7.8 7.7   LABALBU 2.4* 2.2* 2.2*   ALKPHOS 242* 190* 177*     Troponin:   Recent Labs     07/13/21  1915   TROPONINI <0.01     BNP: No results for input(s): BNP in the last 72 hours. Lipids: No results for input(s): CHOL, HDL in the last 72 hours. Invalid input(s): LDLCALCU, TRIGLYCERIDE  ABGs:  No results for input(s): PHART, QJR6DHN, PO2ART, CUR1BOO, BEART, THGBART, D2AAAAWU, MSQ0HQN in the last 72 hours. INR: No results for input(s): INR in the last 72 hours. Lactate: No results for input(s): LACTATE in the last 72 hours. Cultures:  -----------------------------------------------------------------  RAD:   US ABDOMEN LIMITED Specify organ? GALLBLADDER, LIVER, PANCREAS   Final Result      Fatty infiltration of the liver      No gallstones      No free fluid. Assessment/Plan:     Av Veronica. Is a 78 y/o male with a PMH of idiopathic pulmonary fibrosis, chronic respiratory failure with hypoxia, pneumonia of left upper lobe due to infectious organism, RLL osteomyelitis s/p amputation, pulmonary tuberculosis who is admitted for hypokalemia and transamintits. Drug Induced Hepatitis  Patient recently started on RIPE therapy 12 days prior to presentation. ALT, AST, Alk phos initially elevated, trending downward. CT chest abdomen showed no acute abdominal pathology or obstruction on 7/11. Follows up with pulm outpt for IPF.  - Discontinue RIPE therapy  - Discontinue pirfenidone (esbriet)  - RUQ ultrasound showed no gallstones  - Continue to monitor LFTs    Hyponatremia, likely 2/2 hypovolemic hyponatremia  Sodium at 127 on admission. Sodium improved to 127  with IVF. - Trend Na q6hr  - Hold IVF for now     Tuberculosis  - Airborne isolation, contact and droplet isolation  - Consult ID for RIPE tx recs  - Blood cx pending  - Pulm consulted    Chronic hypoxic respiratory failure  On 2 L of O2 at home. History of IPF with last PFTs done outpatient. Follows up with pulm out patient.    - Continue to monitor oxygen requirement  - Titrate O2 as needed      Normocytic anemia likely 2/2 to Anemia of Chronic Disease (stable)  - Continue to monitor daily CBD  - Ferritin 1762     Malnutrition  - Consulted Dietician       Code Status: Full  FEN: regular diet  PPX: lovenox  DISPO: Zacarias Osuna MD, PGY-1  07/15/21  6:10 AM    This patient has been staffed and discussed with Deepak Kenyon MD.     Patient seen and examined, labs and imaging studies reviewed, agree with assessment and plan as outlined above. Continue with current care and plan. Discussed case with patients nurse, discussed case with care team, discussed plan. Transaminase improving clinically he is doing much better, he is tolerating po, patient wanting to go home however will need to monitor lft's while starting new antibiotics. D/w nurse. D/w pulm. The number for the health department is [phone] for rn coordinator, I will update them on plan.     MD Elisa Coon

## 2021-07-15 NOTE — PLAN OF CARE
Problem: Nutrition  Goal: Optimal nutrition therapy  Outcome: Ongoing  Note: Nutrition Problem #1: Predicted inadequate energy intake  Intervention: Food and/or Nutrient Delivery: Start Oral Nutrition Supplement, Continue Current Diet  Nutritional Goals: pt will tolerate PO diet to consume greater than 75% of meals and supplements offered through admission

## 2021-07-16 VITALS
RESPIRATION RATE: 20 BRPM | BODY MASS INDEX: 21 KG/M2 | DIASTOLIC BLOOD PRESSURE: 77 MMHG | HEIGHT: 67 IN | OXYGEN SATURATION: 100 % | HEART RATE: 89 BPM | WEIGHT: 133.82 LBS | TEMPERATURE: 98.4 F | SYSTOLIC BLOOD PRESSURE: 134 MMHG

## 2021-07-16 LAB
ALBUMIN SERPL-MCNC: 2.3 G/DL (ref 3.4–5)
ALP BLD-CCNC: 223 U/L (ref 40–129)
ALT SERPL-CCNC: 75 U/L (ref 10–40)
ANION GAP SERPL CALCULATED.3IONS-SCNC: 6 MMOL/L (ref 3–16)
AST SERPL-CCNC: 89 U/L (ref 15–37)
BASOPHILS ABSOLUTE: 0 K/UL (ref 0–0.2)
BASOPHILS RELATIVE PERCENT: 0.2 %
BILIRUB SERPL-MCNC: 3.6 MG/DL (ref 0–1)
BILIRUBIN DIRECT: 2.7 MG/DL (ref 0–0.3)
BILIRUBIN, INDIRECT: 0.9 MG/DL (ref 0–1)
BUN BLDV-MCNC: 10 MG/DL (ref 7–20)
CALCIUM SERPL-MCNC: 8.1 MG/DL (ref 8.3–10.6)
CHLORIDE BLD-SCNC: 97 MMOL/L (ref 99–110)
CO2: 26 MMOL/L (ref 21–32)
CREAT SERPL-MCNC: 0.9 MG/DL (ref 0.8–1.3)
EOSINOPHILS ABSOLUTE: 0 K/UL (ref 0–0.6)
EOSINOPHILS RELATIVE PERCENT: 0.4 %
GFR AFRICAN AMERICAN: >60
GFR NON-AFRICAN AMERICAN: >60
GLUCOSE BLD-MCNC: 98 MG/DL (ref 70–99)
HCT VFR BLD CALC: 28.3 % (ref 40.5–52.5)
HEMOGLOBIN: 9.5 G/DL (ref 13.5–17.5)
LYMPHOCYTES ABSOLUTE: 0.9 K/UL (ref 1–5.1)
LYMPHOCYTES RELATIVE PERCENT: 12 %
MCH RBC QN AUTO: 32 PG (ref 26–34)
MCHC RBC AUTO-ENTMCNC: 33.7 G/DL (ref 31–36)
MCV RBC AUTO: 94.9 FL (ref 80–100)
MONOCYTES ABSOLUTE: 1.4 K/UL (ref 0–1.3)
MONOCYTES RELATIVE PERCENT: 17.7 %
NEUTROPHILS ABSOLUTE: 5.4 K/UL (ref 1.7–7.7)
NEUTROPHILS RELATIVE PERCENT: 69.7 %
PDW BLD-RTO: 16.4 % (ref 12.4–15.4)
PLATELET # BLD: 277 K/UL (ref 135–450)
PMV BLD AUTO: 6.9 FL (ref 5–10.5)
POTASSIUM REFLEX MAGNESIUM: 3.9 MMOL/L (ref 3.5–5.1)
RBC # BLD: 2.98 M/UL (ref 4.2–5.9)
SODIUM BLD-SCNC: 129 MMOL/L (ref 136–145)
TOTAL PROTEIN: 7.6 G/DL (ref 6.4–8.2)
WBC # BLD: 7.7 K/UL (ref 4–11)

## 2021-07-16 PROCEDURE — 97530 THERAPEUTIC ACTIVITIES: CPT

## 2021-07-16 PROCEDURE — 97165 OT EVAL LOW COMPLEX 30 MIN: CPT

## 2021-07-16 PROCEDURE — 6360000002 HC RX W HCPCS: Performed by: STUDENT IN AN ORGANIZED HEALTH CARE EDUCATION/TRAINING PROGRAM

## 2021-07-16 PROCEDURE — 85025 COMPLETE CBC W/AUTO DIFF WBC: CPT

## 2021-07-16 PROCEDURE — 36415 COLL VENOUS BLD VENIPUNCTURE: CPT

## 2021-07-16 PROCEDURE — 97162 PT EVAL MOD COMPLEX 30 MIN: CPT

## 2021-07-16 PROCEDURE — 97535 SELF CARE MNGMENT TRAINING: CPT

## 2021-07-16 PROCEDURE — 80076 HEPATIC FUNCTION PANEL: CPT

## 2021-07-16 PROCEDURE — 80048 BASIC METABOLIC PNL TOTAL CA: CPT

## 2021-07-16 PROCEDURE — 6370000000 HC RX 637 (ALT 250 FOR IP): Performed by: INTERNAL MEDICINE

## 2021-07-16 PROCEDURE — 6370000000 HC RX 637 (ALT 250 FOR IP): Performed by: STUDENT IN AN ORGANIZED HEALTH CARE EDUCATION/TRAINING PROGRAM

## 2021-07-16 RX ORDER — ONDANSETRON 4 MG/1
4 TABLET, ORALLY DISINTEGRATING ORAL EVERY 8 HOURS PRN
Qty: 30 TABLET | Refills: 1 | Status: SHIPPED | OUTPATIENT
Start: 2021-07-16 | End: 2021-09-10

## 2021-07-16 RX ORDER — ETHAMBUTOL HYDROCHLORIDE 400 MG/1
1200 TABLET, FILM COATED ORAL DAILY
Qty: 30 TABLET | Refills: 4 | Status: ON HOLD
Start: 2021-07-17 | End: 2021-10-19 | Stop reason: HOSPADM

## 2021-07-16 RX ORDER — METOCLOPRAMIDE 10 MG/1
10 TABLET ORAL 4 TIMES DAILY PRN
Qty: 30 TABLET | Refills: 0 | Status: SHIPPED | OUTPATIENT
Start: 2021-07-16 | End: 2021-09-10

## 2021-07-16 RX ADMIN — FAMOTIDINE 20 MG: 20 TABLET ORAL at 08:16

## 2021-07-16 RX ADMIN — METOCLOPRAMIDE HYDROCHLORIDE 10 MG: 5 TABLET ORAL at 08:17

## 2021-07-16 RX ADMIN — POLYETHYLENE GLYCOL 3350 17 G: 17 POWDER, FOR SOLUTION ORAL at 08:48

## 2021-07-16 RX ADMIN — ETHAMBUTOL HYDROCHLORIDE 1200 MG: 400 TABLET, FILM COATED ORAL at 08:19

## 2021-07-16 RX ADMIN — METOCLOPRAMIDE HYDROCHLORIDE 10 MG: 5 TABLET ORAL at 12:41

## 2021-07-16 RX ADMIN — RIFAMPIN 600 MG: 300 CAPSULE ORAL at 08:19

## 2021-07-16 ASSESSMENT — ENCOUNTER SYMPTOMS
NAUSEA: 0
DIARRHEA: 0
COUGH: 0
CHEST TIGHTNESS: 0
VOMITING: 0
CONSTIPATION: 0
BACK PAIN: 0
SHORTNESS OF BREATH: 0
ABDOMINAL PAIN: 0

## 2021-07-16 ASSESSMENT — PAIN SCALES - GENERAL
PAINLEVEL_OUTOF10: 0

## 2021-07-16 NOTE — PROGRESS NOTES
ID chart note    Chart reviewed    Cavitary TB with heavy disease burden, 3+ smear positive on expectorated sputum   Admitted with cholestatic hepatitis and GI symptoms about 10d after starting RIPE   Enzymes came down as meds were held    I restarted rif and ethambutol yesterday 7/15/21  I am told symptomatically the patient is well today though I see the AP is up a bit more which is concerning     He needs to remain inpatient to make sure he is on a stable regimen of at least 3 drugs before he is discharged    Please plan to give rif/eth for at least 3 days (today is day #2) and IF LFTs remain stable, add INH on 7/18/21    If after that he is still doing well and LFTs stable, may be able to be discharged on 7/19 or 7/20     If LFTs rise further as we resume these meds, I would consider adding levaquin as alternative though I am concerned about risk of fluoroquinolone-R TB since I believe he has had long term exposure to cipro in the past related to R foot OM. Please clarify for us when the amp was and how long he may have been on cipro - if remote, that history may not be relevant. In any case, drug sensitivity testing on the MTB will be performed    Case d/w Dr. Nidia Mcmillan at [facility] today  His cell number is [phone]    Dr. Enzo Mason is covering Jehovah's witness today through Sunday.   Please reach out to him via PS with concerns or if LFTs are rising     Case d/w Dr. Daryle Fern, MD

## 2021-07-16 NOTE — CARE COORDINATION
Case Management Assessment            Discharge Note                    Date / Time of Note: 7/16/2021 10:39 AM                  Discharge Note Completed by: Katia Carney RN    Patient Name: Noman Cyr   YOB: 1955  Diagnosis: Pulmonary tuberculosis [A15.0]   Date / Time: 7/13/2021  6:38 PM    Current PCP: Riak Casanova DO  Clinic patient: No    Hospitalization in the last 30 days: No    Advance Directives:  Code Status: Full Code  [place] DNR form completed and on chart: No    Financial:  Payor: Kristoferadam Ng / Plan: MEDICARE PART A AND B / Product Type: *No Product type* /      Pharmacy:    [facility] [address] [address] [phone] Lizette Bruce [phone]  [address] [place] [address]  Phone: [phone] Fax: [phone]      Assistance purchasing medications?: Potential Assistance Purchasing Medications: Yes  Assistance provided by Case Management: None at this time    Does patient want to participate in local refill/ meds to beds program?: Yes    Meds To Beds General Rules:  1. Can ONLY be done Monday- Friday between 8:30am-5pm  2. Prescription(s) must be in pharmacy by 3pm to be filled same day  3. Copy of patient's insurance/ prescription drug card and patient face sheet must be sent along with the prescription(s)  4. Cost of Rx cannot be added to hospital bill. If financial assistance is needed, please contact unit  or ;  or  CANNOT provide pharmacy voucher for patients co-pays  5.  Patients can then  the prescription on their way out of the hospital at discharge, or pharmacy can deliver to the bedside if staff is available. (payment due at time of pick-up or delivery - cash, check, or card accepted)     Able to afford home medications/ co-pay costs: Yes    ADLS:  Current PT AM-PAC Score: 24 /24  Current OT AM-PAC Score: 24 /24      DISCHARGE Disposition: Home- No Services Needed    LOC at discharge: Not Applicable  ZEYNEP Completed: No, Not Indicated    Notification completed in HENS/PAS?:  Not Applicable    IMM Completed:   Yes, Case management has presented and reviewed IMM letter #2 to the patient and/or family/ POA. Patient and/or family/POA verbalized understanding of their medicare rights and appeal process if needed. Patient and/or family/POA has signed, initialed and placed today's date (07.16.2021) and time (.) on IMM letter #2 on the the appropriate lines. Patient and/or family/POA, copy of letter offered and they are aware that this original copy of IMM letter #2 is available prior to discharge from the paper chart on the unit. Electronic documentation has been entered into epic for IMM letter #2 and original paper copy has been added to the paper chart at the nurses station. Transportation:  Transportation PLAN for discharge: family   Mode of Transport: Private Car  Reason for medical transport: Not Applicable  Name of [address] [place] [address],[address]: Not Applicable  Time of Transport:     Transport form completed: No, Not Indicated    Home Care:  [address] ordered at discharge: No, Not [address]: Not Applicable  Orders faxed: No    Durable Medical Equipment:  DME Provider: none  Equipment obtained during hospitalization:     Home Oxygen and Respiratory Equipment:  Oxygen needed at discharge?: Yes  [address]: Normal  Phone: [phone]  Portable tank available for discharge?: Yes    Dialysis:  Dialysis patient: No    Dialysis Center:  Not Applicable    Hospice Services:  Location: Not Applicable  Agency: Not Applicable    Consents signed: No, Not Indicated    Referrals made at [place] [facility] for outpatient continued care:  Not Applicable    Additional CM Notes: Patient safe to return home with family today, patient has home O2 already and family can transport at [facility] department of health notified by MD for continued community follow up at discharge.      2:26 PM  Patient to have blood draws done upon DC, multiple [facility] agencies have reviewed case and are not able to accept due to current precautions needed due to current infectious process. Latonia with [facility] assisting CM in contacting multiple [facility] agencies, CM also left voicemail with [facility] nurse coordinator ([phone]) requesting return call with assistance in obtaining labs for patient at DC. Awaiting return call back at this time. Discussed with MD, if [facility] unable to do blood draws at [place], then per MD patient can go to OP lab for continued blood draws as needed per MD orders. Patient will DC home today. The Plan for Transition of Care is related to the following treatment goals of Pulmonary tuberculosis [A15.0]    The Patient and/or patient representative Rigo Kendall and his family were provided with a choice of provider and agrees with the discharge plan Yes    Freedom of choice list was provided with basic dialogue that supports the patient's individualized plan of care/goals and shares the quality data associated with the providers.  Yes    Care Transitions patient: No    Yoni Ribera RN  [facility]  Case Management Department  Ph: [phone]  Fax: [phone]

## 2021-07-16 NOTE — PROGRESS NOTES
Occupational Therapy   Occupational Therapy Initial Assessment /Treatment/Discharge  Date: 2021   Patient Name: Kan Steven  MRN: [id number]     : 1955    Date of Service: 2021    Discharge Recommendations:    Kan Steven scored a 24/24 on the AM-PAC ADL Inpatient form. At this time, no further OT is recommended upon discharge. Recommend patient returns to prior setting with prior services. OT Equipment Recommendations  Equipment Needed: No    Assessment   Assessment: Pt demonstraes good functional independence and appears to be at his baseline. No acute OT needs identified. Pt expresses no concerns regarding discharge to home. Prognosis: Good  Decision Making: Low Complexity  OT Education: OT Role  Patient Education: Pt verbalized understanding  No Skilled OT: Safe to return home; No OT goals identified; At baseline function  REQUIRES OT FOLLOW UP: No  Activity Tolerance  Activity Tolerance: Patient Tolerated treatment well  Safety Devices  Safety Devices in place: Yes  Type of devices: Left in bed;Call light within reach;Nurse notified         Restrictions  Restrictions/Precautions  Restrictions/Precautions: Weight Bearing  Position Activity Restriction  Other position/activity restrictions: Up as tolerated    Subjective   General  Chart Reviewed: Yes  Additional Pertinent Hx: Pt admitted 21 for decreased oral intake and to evaluate elevated transaminases and low sodium. PMH includes: Rt BKA, Mycobacterium tuberculosis infection, pulmonary fibrosis, Home O2  Family / Caregiver Present: No  Referring Practitioner: Dr. Joelle Hinkle  Diagnosis: Pulmonary tuberculosis  Subjective  Subjective: Pt sitting up in bed upon entry. Pt feels ready to go home.   Patient Currently in Pain: Denies  Social/Functional History  Social/Functional History  Lives With: Spouse  Type of Home: Apartment (1st floor)  Home Layout: One level  Home Access: Stairs to enter with rails  Entrance Stairs - Number of Steps: 15  Entrance Stairs - Rails: Both  Bathroom Shower/Tub: Tub/Shower unit  Bathroom Toilet: Standard  Bathroom Equipment: Grab bars in shower, Shower chair, Hand-held shower  Home Equipment: Rolling walker, Crutches, Oxygen  Receives Help From: Family  ADL Assistance: Independent  Homemaking Assistance: Needs assistance (wife manages)  Homemaking Responsibilities: Yes (has not brady driving recently)  Ambulation Assistance: Independent  Transfer Assistance: Independent  Type of occupation: Hasn't worked the last couple months. [employer]  Additional Comments: No falls       Objective   Vision: Impaired  Vision Exceptions: Wears glasses at all times  Hearing: Within functional limits    Orientation  Overall Orientation Status: Within Functional Limits     Balance  Sitting Balance: Independent  Standing Balance: Independent  Standing Balance  Time: ~3 min  Activity: bathroom mobility/activity, walk in room  Functional Mobility  Functional - Mobility Device: No device  Activity: To/from bathroom; Other  Assist Level: Independent  Toilet Transfers  Toilet - Technique: Ambulating  Equipment Used: Standard toilet (grab bar)  Toilet Transfer: Modified independent  ADL  Grooming: Independent  LE Dressing: Independent (with prosthesis)  Toileting:  (denied need)  Tone RUE  RUE Tone: Normotonic  Tone LUE  LUE Tone: Normotonic  Coordination  Movements Are Fluid And Coordinated: Yes     Bed mobility  Scooting: Independent  Transfers  Stand Step Transfers: Independent  Sit to stand: Independent  Stand to sit: Independent     Cognition  Overall Cognitive Status: WNL                 LUE AROM (degrees)  LUE AROM : WFL  RUE AROM (degrees)  RUE AROM : WFL  LUE Strength  Gross LUE Strength: WFL  RUE Strength  Gross RUE Strength: WFL           Treatment included ADL and transfer training.         Plan   Plan  Plan Comment: Discharge pt from acute OT    AM-PAC Score        AM-PAC Inpatient Daily Activity Raw Score: 24 (07/16/21 0916)  AM-PAC Inpatient ADL T-Scale Score : 57.54 (07/16/21 0916)  ADL Inpatient CMS 0-100% Score: 0 (07/16/21 0916)  ADL Inpatient CMS G-Code Modifier : CH (07/16/21 9644)    Goals    No goals indicated       Therapy Time   Individual Concurrent Group Co-treatment   Time In 0845         Time Out 0910         Minutes 25         Timed Code Treatment Minutes: 15 Minutes    Total Treatment Time:25    Keyla Ballard OTR/L [id number]

## 2021-07-16 NOTE — CARE COORDINATION
CTN contacted Aliza with [facility] [phone]. They have accepted this patient and will pull referral from [facility]. They will contact patient and make arrangements for [facility]. Electronically signed by Chema Magallanes LPN on 9/30/6442 at 4:75 PM    ADDENDUM    ALTERNATE SOLUTIONS UNABLE TO ACCEPT PATIENT DUE TO DIAGNOSIS    Referral sent to [facility] to see if they will see patient.  Electronically signed by Chema Magallanes LPN on 5/46/8103 at 7:42 PM

## 2021-07-16 NOTE — PROGRESS NOTES
Progress Note    Admit Date: 7/13/2021  Day: 1  Diet: ADULT DIET; Regular; Vegetarian (Lacto-Ovo)  Adult Oral Nutrition Supplement; Other Oral Supplement; Plant Based- Kevin Hastings    CC: Labs, hypokalemia and transaminitis    Interval history: NAONE. Patient was seen and examined this am. He was in NAD, sitting comfortable in bed. Denies SOB, CP, N/V, abdominal pain, c/d or leg swelling. Medications:     Scheduled Meds:   rifAMPin  600 mg Oral Daily    ethambutol  1,200 mg Oral Daily    metoclopramide  10 mg Oral 4x Daily AC & HS    famotidine  20 mg Oral BID    sodium chloride flush  5-40 mL Intravenous 2 times per day    enoxaparin  40 mg Subcutaneous Daily     Continuous Infusions:   sodium chloride      [Held by provider] sodium chloride Stopped (07/15/21 1053)     PRN Meds:dextromethorphan-guaiFENesin, sodium chloride flush, sodium chloride, ondansetron **OR** ondansetron, polyethylene glycol, acetaminophen **OR** acetaminophen    Objective:   Vitals:   T-max:  Patient Vitals for the past 8 hrs:   BP Temp Temp src Pulse Resp SpO2   07/16/21 0021 132/77 98.1 °F (36.7 °C) Oral 97 18 98 %       Intake/Output Summary (Last 24 hours) at 7/16/2021 0615  Last data filed at 7/15/2021 2051  Gross per 24 hour   Intake 240 ml   Output --   Net 240 ml       Review of Systems   Constitutional: Negative for chills and fever. Respiratory: Negative for cough, chest tightness and shortness of breath. Cardiovascular: Negative for chest pain, palpitations and leg swelling. Gastrointestinal: Negative for abdominal pain, constipation, diarrhea, nausea and vomiting. Genitourinary: Negative for difficulty urinating, dysuria, frequency and urgency. Musculoskeletal: Negative for back pain. Skin: Negative for rash. Neurological: Negative for dizziness and headaches. Physical Exam  Constitutional:       General: He is not in acute distress. Appearance: He is not toxic-appearing.    HENT:      Head: Normocephalic and atraumatic. Nose: No congestion or rhinorrhea. Mouth/Throat:      Mouth: Mucous membranes are moist.      Pharynx: No oropharyngeal exudate or posterior oropharyngeal erythema. Eyes:      General: No scleral icterus. Conjunctiva/sclera: Conjunctivae normal.   Cardiovascular:      Rate and Rhythm: Normal rate and regular rhythm. Pulses: Normal pulses. Heart sounds: Normal heart sounds. No murmur heard. No gallop. Pulmonary:      Effort: Pulmonary effort is normal. No respiratory distress. Breath sounds: No wheezing or rhonchi. Abdominal:      General: There is no distension. Palpations: Abdomen is soft. Tenderness: There is no abdominal tenderness. There is no guarding. Musculoskeletal:         General: No swelling or tenderness. Cervical back: Neck supple. No tenderness. Comments: S/p R BKA   Skin:     Capillary Refill: Capillary refill takes less than 2 seconds. Coloration: Skin is not jaundiced or pale. Findings: No erythema or rash. Neurological:      General: No focal deficit present. Mental Status: He is alert and oriented to person, place, and time. Psychiatric:         Mood and Affect: Mood normal.         Behavior: Behavior normal.         LABS:    CBC:   Recent Labs     07/13/21 1915 07/14/21 0515 07/15/21  0502   WBC 9.7 8.4 7.0   HGB 10.7* 10.2* 10.0*   HCT 31.7* 29.9* 30.4*    353 337   MCV 96.3 94.2 96.3     Renal:    Recent Labs     07/13/21 1915 07/14/21  0201 07/14/21 0515 07/14/21  1049 07/15/21  0502   *   < > 127* 127* 128*   K 3.6  --  3.9  --  4.0   CL 88*  --  93*  --  96*   CO2 23  --  25  --  24   BUN 11  --  12  --  12   CREATININE 0.7*  --  0.7*  --  0.7*   GLUCOSE 88  --  101*  --  76   CALCIUM 7.9*  --  8.1*  --  7.9*   ANIONGAP 11  --  9  --  8    < > = values in this interval not displayed.      Hepatic:   Recent Labs     07/13/21 1915 07/14/21  0515 07/15/21  0502   AST 102* 91* 95*   * 89* 79*   BILITOT 6.0* 6.6* 4.0*   BILIDIR 4.6* 5.1* 2.7*   PROT 8.5* 7.8 7.7   LABALBU 2.4* 2.2* 2.2*   ALKPHOS 242* 190* 177*     Troponin:   Recent Labs     07/13/21  1915   TROPONINI <0.01     BNP: No results for input(s): BNP in the last 72 hours. Lipids: No results for input(s): CHOL, HDL in the last 72 hours. Invalid input(s): LDLCALCU, TRIGLYCERIDE  ABGs:  No results for input(s): PHART, DWL1FPK, PO2ART, EFS0PVA, BEART, THGBART, E3KMSKJT, NMI6RTG in the last 72 hours. INR: No results for input(s): INR in the last 72 hours. Lactate: No results for input(s): LACTATE in the last 72 hours. Cultures:  -----------------------------------------------------------------  RAD:   US ABDOMEN LIMITED Specify organ? GALLBLADDER, LIVER, PANCREAS   Final Result      Fatty infiltration of the liver      No gallstones      No free fluid. Assessment/Plan:     Marcela Andrews Is a 78 y/o male with a PMH of idiopathic pulmonary fibrosis, chronic respiratory failure with hypoxia, pneumonia of left upper lobe due to infectious organism, RLL osteomyelitis s/p amputation, pulmonary tuberculosis who is admitted for hypokalemia and transamintits. Drug Induced Liver Injury  Patient recently started on RIPE therapy 12 days prior to presentation. ALT, AST, Alk phos initially elevated, trending downward. CT chest abdomen showed no acute abdominal pathology or obstruction on 7/11. Follows up with pulm outpt for IPF. LFT improving.  - Slow reintroduction of RIPE meds, starting with rifampin and ethambutol  - Discontinue pirfenidone (esbriet)  - RUQ ultrasound showed no gallstones  - Continue to monitor LFTs    Hyponatremia, likely 2/2 hypovolemic hyponatremia  Sodium at 127 on admission. Sodium improved to 127  with IVF.    - Trend Na q6hr  - Hold IVF for now     Tuberculosis  Health department contacted by primary team. Med Dir at TB clinic contacted by ID   - Airborne isolation, contact and droplet isolation  - Reintroduce rifampin and ethambutol  - Blood cx NGTD  - Pulm consulted     Chronic hypoxic respiratory failure  On 2 L of O2 at home. History of IPF with last PFTs done outpatient. Follows up with pulm out patient. - Continue to monitor oxygen requirement  - Titrate O2 as needed      Normocytic anemia likely 2/2 to Anemia of Chronic Disease (stable)  - Continue to monitor daily CBC  - Ferritin 1762     Malnutrition  - Consulted Dietician       Code Status: Full  FEN: regular diet  PPX: lovenox  DISPO: home    Tasneem Raphael MD, PGY-1  07/16/21  6:15 AM    This patient has been staffed and discussed with Mariusz Chapin MD.     Patient seen and examined, labs and imaging studies reviewed, agree with assessment and plan as outlined above. Continue with current care and plan. Discussed case with patients nurse, discussed case with care team, discussed plan.       MD Shadia Garcia

## 2021-07-16 NOTE — DISCHARGE INSTR - COC
Continuity of Care Form    Patient Name: Melina Rivas   :  1955  MRN:  [id number]    Admit date:  2021  Discharge date:  ***    Code Status Order: Full Code   Advance Directives:      Admitting Physician:  Monet Mcqueen DO  PCP: Kunal Ramirez DO    Discharging Nurse: [facility] Unit/Room#: 8715/6906-08  Discharging Unit Phone Number: ***    Emergency Contact:   Extended Emergency Contact Information  Primary Emergency Contact: Chris Fischer, [address] Phone: [phone]  Relation: Niece/Nephew  Secondary Emergency Contact: Lior Hwang of [facility] Phone: [phone]  Mobile Phone: [phone]  Relation: Spouse    Past Surgical History:  Past Surgical History:   Procedure Laterality Date    BRONCHOSCOPY N/A 2021    BRONCHOSCOPY BRONCHOALVEOLAR LAVAGE performed by Ethel Osborne MD at [address]  2021    BRONCHOSCOPY BRUSHINGS performed by Ethel Osobrne MD at [facility]  2021    CT GUIDED CHEST TUBE 2021 [facility] CT SCAN    CT NEEDLE BIOPSY LUNG PERCUTANEOUS  2021    CT NEEDLE BIOPSY LUNG PERCUTANEOUS 2021 [facility] CT SCAN       Immunization History:   Immunization History   Administered Date(s) Administered    COVID-19, Pfizer, PF, 30mcg/0.3mL 2021, 2021    Influenza Virus Vaccine 2012    Influenza, High Dose (Fluzone 65 yrs and older) 2020    Influenza, MDCK Quadv, with preserv IM (Flucelvax 4 yrs and older) 2018    Pneumococcal Polysaccharide (Xttwvbhgg31) 2012, 2019    Tdap (Boostrix, Adacel) 2020    Zoster Recombinant (Shingrix) 10/27/2020       Active Problems:  Patient Active Problem List   Diagnosis Code    IPF (idiopathic pulmonary fibrosis) ([facility] [address] J84.112    Chronic bilateral low back pain without sciatica M54.5, G89.29    Chronic respiratory failure with hypoxia (HCC) J96.11    Pneumothorax after biopsy J95.811    Moderate malnutrition ([address] E44.0    Pneumonia of left upper lobe due to infectious organism J18.9    Pulmonary tuberculosis A15.0    Hyponatremia E87.1    Transaminitis R74.01       Isolation/Infection:   Isolation            Airborne          Patient Infection Status       Infection Onset Added Last Indicated Last Indicated By Review Planned Expiration Resolved Resolved By    Mycobacterium tuberculosis, active disease 06/23/21 07/07/21 06/23/21 Culture with Smear, Acid Fast Bacillius 07/21/21       AFB 06/23/21 06/30/21 06/23/21 Culture with Smear, Acid Fast Bacillius 07/21/21       1+ smear positive. TB probe sent to [facility] (1-). Dr Keeley Ramirez reported as positive but not resulted in chart yet. Vilma Infection Prevention            Nurse Assessment:  Last Vital Signs: /77   Pulse 89   Temp 98.4 °F (36.9 °C) (Oral)   Resp 20   Ht 5' 7\" (1.702 m)   Wt 133 lb 13.1 oz (60.7 kg)   SpO2 100%   BMI 20.96 kg/m²     Last documented pain score (0-10 scale): Pain Level: 0  Last Weight:   Wt Readings from Last 1 Encounters:   07/14/21 133 lb 13.1 oz (60.7 kg)     Mental Status:  {IP PT MENTAL STATUS:82428:::0}    IV Access:  { ZEYNEP IV ACCESS:938209412:::0}    Nursing Mobility/ADLs:  Walking   {CHP DME ADLs:345421708:::0}  Transfer  {CHP DME ADLs:755835604:::0}  Bathing  {CHP DME ADLs:115145772:::0}  Dressing  {CHP DME ADLs:834766996:::0}  Toileting  {CHP DME ADLs:325707176:::0}  Feeding  {CHP DME ADLs:576821987:::0}  Med Admin  {CHP DME ADLs:502993204:::0}  Med Delivery   { ZEYNEP MED Delivery:079422169:::0}    Wound Care Documentation and Therapy:        Elimination:  Continence:    Bowel: {YES / AO:79774}  Bladder: {YES / JF:83533}  Urinary Catheter: {Urinary Catheter:907268169:::0}   Colostomy/Ileostomy/Ileal Conduit: {YES / KD:95217}       Date of Last BM: ***    Intake/Output Summary (Last 24 hours) at 7/16/2021 1157  Last data filed at 7/16/2021 0758  Gross per 24 hour   Intake 240 ml   Output 200 ml   Net 40 ml     I/O first week. Then check this every other day after one week. Please fax all of these results to Dr. Lazarus East at [phone] and Dr. Daniella East at [phone]. Physician Certification: I certify the above information and transfer of Kan Steven  is necessary for the continuing treatment of the diagnosis listed and that he requires [address] for greater 30 days.      Update Admission H&P: Changes in H&P as follows - home blood draws    PHYSICIAN SIGNATURE:  Electronically signed by Deepak Kenyon MD on 7/16/21 at 11:57 AM EDT

## 2021-07-16 NOTE — DISCHARGE INSTR - DIET

## 2021-07-16 NOTE — PROGRESS NOTES
Physical Therapy    Facility/Department: [place] [address] [facility]U  Initial Assessment/Treatment/Discharge    NAME: Izaiah Dyson  : 1955  MRN: [id number]    Date of Service: 2021    Discharge Recommendations:    Izaiah Dyson scored a 24/24 on the AM-PAC short mobility form. At this time, no further PT is recommended upon discharge. Recommend patient returns to prior setting with prior services. PT Equipment Recommendations  Equipment Needed: No    Assessment   Assessment: Pt at baseline for functional mobility. Safe to go home upon D/C. No further inpt PT indicated. D/C PT. Decision Making: Medium Complexity  PT Education: PT Role  No Skilled PT: Independent with functional mobility   REQUIRES PT FOLLOW UP: No  Activity Tolerance  Activity Tolerance: Patient Tolerated treatment well       Patient Diagnosis(es): The primary encounter diagnosis was Hyponatremia. A diagnosis of Transaminitis was also pertinent to this visit. has a past medical history of Amputation of leg below knee, right, traumatic, complicated, subsequent encounter, MTB (Mycobacterium tuberculosis infection), and Pulmonary fibrosis ([facility] [address] has a past surgical history that includes CT NEEDLE BIOPSY LUNG PERCUTANEOUS (2021); CT GUIDED CHEST TUBE (2021); bronchoscopy (N/A, 2021); and bronchoscopy (2021). Restrictions  Restrictions/Precautions  Restrictions/Precautions: Weight Bearing  Position Activity Restriction  Other position/activity restrictions: Up as tolerated  Vision/Hearing        Subjective  General  Chart Reviewed: Yes  Patient assessed for rehabilitation services?: Yes  Additional Pertinent Hx: PMH: right BKA, pumonary fibrosis, TB. Pt admitted with nausea & decreased PO intake. found to have hyponatremia, transaminitis.   Family / Caregiver Present: No  Referring Practitioner: Terri Carrasco  Referral Date : 07/15/21  Diagnosis: hyponatremia  Follows Commands: Within Functional Limits  Subjective  Subjective: Pt sitting up in bed & agreeable to PT. Pain Screening  Patient Currently in Pain: Denies  Vital Signs  Patient Currently in Pain: Denies       Orientation  Orientation  Overall Orientation Status: Within Normal Limits  Social/Functional History  Social/Functional History  Lives With: Spouse  Type of Home: Apartment (1st floor)  Home Layout: One level  Home Access: Stairs to enter with rails  Entrance Stairs - Number of Steps: 15  Entrance Stairs - Rails: Both  Bathroom Shower/Tub: Tub/Shower unit  Bathroom Toilet: Standard  Bathroom Equipment: Grab bars in shower, Shower chair, Hand-held shower  Home Equipment: Rolling walker, Crutches, Oxygen  Receives Help From: Family  ADL Assistance: Independent  Homemaking Assistance: Needs assistance (wife manages)  Homemaking Responsibilities: Yes (has not brady driving recently)  Ambulation Assistance: Independent  Transfer Assistance: Independent  Type of occupation: Hasn't worked the last couple months. [employer]  Additional Comments: No falls       Objective          AROM RLE (degrees)  RLE AROM: WFL  RLE General AROM: hip WFL, (BKA)  AROM LLE (degrees)  LLE AROM : WFL  Strength RLE  Strength RLE: WFL  Strength LLE  Strength LLE: WFL        Bed mobility  Supine to Sit: Independent  Sit to Supine: Independent  Scooting: Independent  Transfers  Sit to Stand: Independent  Stand to sit: Independent  Ambulation  Ambulation?: Yes  Ambulation 1  Surface: level tile  Device: No Device  Other Apparatus: O2 (2 L O2, right LE prosthesis)  Assistance: Independent  Distance: 10 ft, 100 ft  Comments: no LOB  Stairs/Curb  Stairs?: Yes  Stairs  # Steps : 1  Stairs Height: 6\"  Rails: Bilateral  Assistance: Independent  Comment: up/down shower step using wall on both sides. could not attempt stairs in hallway d/t pt confined to room.      Balance  Sitting - Static: Good  Sitting - Dynamic: Good  Standing - Static: Good  Standing - Dynamic: Good        Plan Safety Devices  Type of devices: Left in bed, Call light within reach, Nurse notified (pt off alarm per RN)                                                     AM-PAC Score  AM-PAC Inpatient Mobility Raw Score : 24 (07/16/21 0945)  AM-PAC Inpatient T-Scale Score : 61.14 (07/16/21 0945)  Mobility Inpatient CMS 0-100% Score: 0 (07/16/21 0945)  Mobility Inpatient CMS G-Code Modifier : [facility] (07/16/21 0945)              Therapy Time   Individual Concurrent Group Co-treatment   Time In 0845         Time Out 0908         Minutes 23                 Yari Pantoja, PT

## 2021-07-17 LAB
BLOOD CULTURE, ROUTINE: NORMAL
CULTURE, BLOOD 2: NORMAL

## 2021-07-19 ENCOUNTER — TELEPHONE (OUTPATIENT)
Dept: PRIMARY CARE CLINIC | Age: 66
End: 2021-07-19

## 2021-07-19 DIAGNOSIS — R68.2 DRY MOUTH: ICD-10-CM

## 2021-07-19 RX ORDER — SALIVA SUBSTITUTE COMB NO.11 351 MG
1 POWDER IN PACKET (EA) MUCOUS MEMBRANE 3 TIMES DAILY
Qty: 120 EACH | Refills: 1 | Status: SHIPPED | OUTPATIENT
Start: 2021-07-19 | End: 2021-09-10

## 2021-07-19 RX ORDER — XYLITOL/YERBA SANTA
1 AEROSOL, SPRAY WITH PUMP (ML) MUCOUS MEMBRANE PRN
Qty: 236 ML | Refills: 5 | Status: SHIPPED | OUTPATIENT
Start: 2021-07-19 | End: 2021-09-10

## 2021-07-19 NOTE — TELEPHONE ENCOUNTER
Medication: Artificial Saliva (SALIVAMAX) PACK          Last Filled:  06/22/21    Patient Phone Number: [phone] (home)     Last appt: 6/16/2021 Return in about 6 weeks (around 7/28/2021). Next appt: 8/3/2021    Last OARRS:   RX Monitoring 1/12/2019   Attestation The Prescription Monitoring Report for this patient was reviewed today. Periodic Controlled Substance Monitoring No signs of potential drug abuse or diversion identified.

## 2021-07-19 NOTE — TELEPHONE ENCOUNTER
----- Message from Gonzales Mcnamara sent at 7/16/2021  3:50 PM EDT -----  Subject: Refill Request    QUESTIONS  Name of Medication? Artificial Saliva (SALIVAMAX) PACK  Patient-reported dosage and instructions? 1 packet 3x daily  How many days do you have left? 0  Preferred Pharmacy? [facility] phone number (if available)? [phone]  ---------------------------------------------------------------------------  --------------,  Name of Medication? BIOTIN PO  Patient-reported dosage and instructions? unknown  How many days do you have left? 0  Preferred Pharmacy? [facility] phone number (if available)? [phone]  ---------------------------------------------------------------------------  --------------,  Name of Medication? sertraline (ZOLOFT) 25 MG tablet  Patient-reported dosage and instructions? 25mg  How many days do you have left? 10  Preferred Pharmacy? [facility] phone number (if available)? [phone]  ---------------------------------------------------------------------------  --------------  Lavern HUBBARD  What is the best way for the office to contact you? OK to leave message on   voicemail  Preferred Call Back Phone Number?  [phone]

## 2021-07-19 NOTE — TELEPHONE ENCOUNTER
Med. Problem    [facility] would like if another medication can be sent instead of the Artificial Saliva (SALIVAMAX) PACK. They don't carry this item and they will not carry it moving forward. They would like to know can something else be issued in its place.     HEART [facility] [address] [place], [facility] [phone]

## 2021-07-19 NOTE — TELEPHONE ENCOUNTER
Spoke with pharm they stated what do you recommend they have never seen this before so they do not have any suggestions.

## 2021-07-19 NOTE — TELEPHONE ENCOUNTER
[facility] Transitions Initial Follow Up Call    Outreach made within 2 business days of discharge: Yes    Patient: Antonio Swann Patient : 1955   MRN: <[id number]>  Reason for Admission: There are no discharge diagnoses documented for the most recent discharge. Discharge Date: 21       Spoke with: patient has follow up appts with cardio and PCP.   HFU with PCP August 3    Discharge department/facility: [facility]    Scheduled appointment with PCP within 7-14 days    Follow Up  Future Appointments   Date Time Provider Oz Alonso   2021  2:30 PM [address] [place] [address] HOD   2021  2:30 PM CARDIAC REHAB [facility] ROOM [address] [place] [address] HOD   2021  2:30 PM CARDIAC REHAB [facility] ROOM [address] [place] [address] HOD   2021  2:30 PM CARDIAC REHAB [facility] ROOM [address] [place] [address] HOD   2021  2:30 PM CARDIAC REHAB [facility] ROOM [place] [address] HOD   2021  2:30 PM CARDIAC REHAB [facility] ROOM [place] [address] HOD   8/3/2021  3:00 PM DO Jerald Trent [address]   2021  2:30 PM [address] [place] [address] HOD   2021  2:30 PM [address] [place] [address] HOD   2021  2:30 PM [address] [place] [address] HOD   2021  2:30 PM CARDIAC REHAB [facility] ROOM [address] [place] [address] HOD   2021  2:30 PM CARDIAC REHAB [address] [place] [address]

## 2021-07-20 ENCOUNTER — TELEPHONE (OUTPATIENT)
Dept: INFECTIOUS DISEASES | Age: 66
End: 2021-07-20

## 2021-07-20 LAB
ALBUMIN SERPL-MCNC: 2.6 G/DL
ALP BLD-CCNC: 154 U/L
ALT SERPL-CCNC: 78 U/L
ANION GAP SERPL CALCULATED.3IONS-SCNC: ABNORMAL MMOL/L
AST SERPL-CCNC: 86 U/L
BASOPHILS ABSOLUTE: 24 /ΜL
BASOPHILS RELATIVE PERCENT: 0.3 %
BILIRUB SERPL-MCNC: 2.6 MG/DL (ref 0.1–1.4)
BUN BLDV-MCNC: 7 MG/DL
CALCIUM SERPL-MCNC: 7.8 MG/DL
CHLORIDE BLD-SCNC: 94 MMOL/L
CO2: 27 MMOL/L
CREAT SERPL-MCNC: 0.81 MG/DL
EOSINOPHILS ABSOLUTE: 24 /ΜL
EOSINOPHILS RELATIVE PERCENT: 0.3 %
GFR CALCULATED: ABNORMAL
GLUCOSE BLD-MCNC: 93 MG/DL
HCT VFR BLD CALC: 28.3 % (ref 41–53)
HEMOGLOBIN: 9.4 G/DL (ref 13.5–17.5)
LYMPHOCYTES ABSOLUTE: 909 /ΜL
LYMPHOCYTES RELATIVE PERCENT: 11.5 %
MCH RBC QN AUTO: 30.7 PG
MCHC RBC AUTO-ENTMCNC: 33.2 G/DL
MCV RBC AUTO: 92.5 FL
MONOCYTES ABSOLUTE: 1043 /ΜL
MONOCYTES RELATIVE PERCENT: 13.2 %
NEUTROPHILS ABSOLUTE: 5901 /ΜL
NEUTROPHILS RELATIVE PERCENT: 74.7 %
PDW BLD-RTO: 15.7 %
PLATELET # BLD: 208 K/ΜL
PMV BLD AUTO: 210.4 FL
POTASSIUM SERPL-SCNC: 3.8 MMOL/L
RBC # BLD: 3.06 10^6/ΜL
SODIUM BLD-SCNC: 3.8 MMOL/L
TOTAL PROTEIN: 7.6
WBC # BLD: 7.9 10^3/ML

## 2021-07-20 NOTE — TELEPHONE ENCOUNTER
Had left message with [facility] 7/19. Today 7/20, NESHA Kahn called me. She is aware of pt's hospitalization with GI sx and elevated LFTs (7/13-7/16)  Dr La Nena Ortiz had spoken to Dr Nidia Mcmillan. Pt discharged on 7/16, on Rif, Ethambutol  Pt restarted on INH per [facility]  Pt will have labs today which [facility] will f/u on results (last 7/16 - AST 89, ALT 75, alk phos 223)  [facility] will resume care at this point.

## 2021-07-21 ENCOUNTER — TELEPHONE (OUTPATIENT)
Dept: PRIMARY CARE CLINIC | Age: 66
End: 2021-07-21

## 2021-07-21 ENCOUNTER — TELEPHONE (OUTPATIENT)
Dept: PULMONOLOGY | Age: 66
End: 2021-07-21

## 2021-07-21 DIAGNOSIS — K59.00 CONSTIPATION, UNSPECIFIED CONSTIPATION TYPE: Primary | ICD-10-CM

## 2021-07-21 LAB
A/G RATIO: ABNORMAL
ALBUMIN SERPL-MCNC: 2.5 G/DL
ALP BLD-CCNC: 155 U/L
ALT SERPL-CCNC: 77 U/L
AST SERPL-CCNC: 79 U/L
BILIRUB SERPL-MCNC: 2 MG/DL (ref 0.1–1.4)
BILIRUBIN DIRECT: 2 MG/DL
BILIRUBIN, INDIRECT: 0.9
GLOBULIN: 4.9
Lab: NORMAL
PROTEIN TOTAL: 7.4 G/DL
REPORT: NORMAL
THIS TEST SENT TO: NORMAL

## 2021-07-21 NOTE — TELEPHONE ENCOUNTER
[facility] [phone]. Per Laura pt was given H2O restriction of only 2 bottles per day. They are asking if he can resume normal liquids and his protein shakes? Pt is having issues with constipation/hemorrhoids at this time, they will call PCP for those issues. Please advise.  Thank you

## 2021-07-21 NOTE — TELEPHONE ENCOUNTER
2nd message----  Cheri Vázquez called to schedule hospital follow up-dc'd 7/16. Patient is scheduled for your 1st afternoon appt 8/23. Cheri Vázquez can only bring him in the afternoon due to school. The end of July will not work for them either. Is the 8/23 ok or do you need to see him sooner?

## 2021-07-21 NOTE — TELEPHONE ENCOUNTER
----- Message from Bennett Osler sent at 7/21/2021 11:49 AM EDT -----  Subject: Message to Provider    QUESTIONS  Information for Provider? Patient is at home with TB and is having   hemorrhoids and constipation and is needing medication. He tried over the   counter medication but it did not help. patient is on a fluid restriction   and needs to know if he needs to still be on that. Laura also needs to   know if she can get a UA'CNS for his burning while urinating. pls advise   ---------------------------------------------------------------------------  --------------  CALL BACK INFO  What is the best way for the office to contact you? OK to leave message on   voicemail  Preferred Call Back Phone Number? [phone]  ---------------------------------------------------------------------------  --------------  SCRIPT ANSWERS  Relationship to Patient? Third Party  Representative Name?  pasquale ([facility])

## 2021-07-22 ENCOUNTER — TELEPHONE (OUTPATIENT)
Dept: PRIMARY CARE CLINIC | Age: 66
End: 2021-07-22

## 2021-07-22 RX ORDER — XYLITOL/YERBA SANTA
1 AEROSOL, SPRAY WITH PUMP (ML) MUCOUS MEMBRANE PRN
Qty: 236 ML | Refills: 2 | Status: SHIPPED | OUTPATIENT
Start: 2021-07-22 | End: 2021-09-10

## 2021-07-22 RX ORDER — SENNA AND DOCUSATE SODIUM 50; 8.6 MG/1; MG/1
1 TABLET, FILM COATED ORAL 2 TIMES DAILY
Qty: 60 TABLET | Refills: 2 | Status: SHIPPED | OUTPATIENT
Start: 2021-07-22

## 2021-07-22 NOTE — TELEPHONE ENCOUNTER
Med rep. Pharmacy would like to know how many times a day is pt allowed to use this product.     Artificial Saliva (MOUTH KOTE) SOLN solution    [facility] [address] [place], [address] [place] [address],[address]

## 2021-07-23 ENCOUNTER — TELEPHONE (OUTPATIENT)
Dept: INFECTIOUS DISEASES | Age: 66
End: 2021-07-23

## 2021-07-23 NOTE — TELEPHONE ENCOUNTER
[place] nurse called to notify she has been drawing LFT's every day. No one is available to draw them this weekend, asking if okay to wait until Monday?     [address] [place] [address] [phone]

## 2021-07-26 LAB
FUNGUS (MYCOLOGY) CULTURE: NORMAL
FUNGUS (MYCOLOGY) CULTURE: NORMAL
FUNGUS STAIN: NORMAL
FUNGUS STAIN: NORMAL

## 2021-08-06 LAB
A/G RATIO: ABNORMAL
ALBUMIN SERPL-MCNC: 2.3 G/DL
ALP BLD-CCNC: 157 U/L
ALT SERPL-CCNC: 34 U/L
AST SERPL-CCNC: 68 U/L
BILIRUB SERPL-MCNC: 2.5 MG/DL (ref 0.1–1.4)
BILIRUBIN DIRECT: 1.3 MG/DL
BILIRUBIN, INDIRECT: 1.2
GLOBULIN: 4.9
PROTEIN TOTAL: 7.2 G/DL

## 2021-08-10 ENCOUNTER — TELEPHONE (OUTPATIENT)
Dept: PULMONOLOGY | Age: 66
End: 2021-08-10

## 2021-08-10 LAB
AFB CULTURE (MYCOBACTERIA): NORMAL
AFB SMEAR: NORMAL

## 2021-08-10 NOTE — TELEPHONE ENCOUNTER
Carlos has questions re: filling out forms for [facility]. Unfortunately there has been a delay in sending and nephhal is concerned that he can get forms in on time. The appt at [facility] is on 08/17/2021. It was offered that office can fax forms once he completes them, if that would be beneficial for them. Office appt with Dr. Crescencio Munoz on 08/23/2021 was confirmed. Note: Carlos calls back after having called [facility]. Their visit there will be postponed due to tuberculosis diagnosis. He is instructed to call [facility] back after visiting with Inf Disease Dr. Stella Scott. Nephew will call [facility] nurse to find out when it is appropriate to be seen by Dr. Stella Scott. He will call us back at a later time to r/s office visit with Dr. Crescencio Munoz after other visits have been scheduled.

## 2021-08-23 ENCOUNTER — OFFICE VISIT (OUTPATIENT)
Dept: PULMONOLOGY | Age: 66
End: 2021-08-23
Payer: MEDICARE

## 2021-08-23 VITALS
TEMPERATURE: 96.6 F | HEIGHT: 67 IN | WEIGHT: 130 LBS | OXYGEN SATURATION: 100 % | BODY MASS INDEX: 20.4 KG/M2 | HEART RATE: 94 BPM

## 2021-08-23 DIAGNOSIS — A15.0 PULMONARY TUBERCULOSIS: Primary | ICD-10-CM

## 2021-08-23 DIAGNOSIS — J84.112 IPF (IDIOPATHIC PULMONARY FIBROSIS) (HCC): ICD-10-CM

## 2021-08-23 PROCEDURE — 3017F COLORECTAL CA SCREEN DOC REV: CPT | Performed by: INTERNAL MEDICINE

## 2021-08-23 PROCEDURE — 4040F PNEUMOC VAC/ADMIN/RCVD: CPT | Performed by: INTERNAL MEDICINE

## 2021-08-23 PROCEDURE — 1123F ACP DISCUSS/DSCN MKR DOCD: CPT | Performed by: INTERNAL MEDICINE

## 2021-08-23 PROCEDURE — 1036F TOBACCO NON-USER: CPT | Performed by: INTERNAL MEDICINE

## 2021-08-23 PROCEDURE — G8427 DOCREV CUR MEDS BY ELIG CLIN: HCPCS | Performed by: INTERNAL MEDICINE

## 2021-08-23 PROCEDURE — 99214 OFFICE O/P EST MOD 30 MIN: CPT | Performed by: INTERNAL MEDICINE

## 2021-08-23 PROCEDURE — G8420 CALC BMI NORM PARAMETERS: HCPCS | Performed by: INTERNAL MEDICINE

## 2021-08-23 ASSESSMENT — ENCOUNTER SYMPTOMS
SHORTNESS OF BREATH: 1
CHEST TIGHTNESS: 0
COUGH: 1
WHEEZING: 0

## 2021-08-23 NOTE — PROGRESS NOTES
[facility] Pulmonary and Critical Care    Outpatient Note    Subjective:   CHIEF COMPLAINT:   No chief complaint on file. Interval history on 8/23/21  He feels very well. There is no cough. He gets SOB with exertion without O2. He is on 2 liters of O2. In general there is no sputum production, hemoptysis, fever or night sweats. He is currently on ethambutol, rifampin isoniazid and b6 and levofloxacin. Ethambutol will be discontinued after the 2 months priya. Weight is stable at this time. He lost some earlier but over the past 5 days it is stable, in fact he gained few lbs. He is not on Esbriet yet. He touched base with [employer] and he was told to wait for 2 months before making any decision. Interval history on 6/9/21  Overall he is doing better since he was started on O2. Has not received Esbriet yet, apparently there was discrepancy in the last name on one form he filled on line. There is no increase in cough or sputum production. Interval history on 5/13/21  Continue to have SOLORZANO, with recurrent upper abdominal spasms. Appetite is poor. HPI:  5/4/21   The patient is 74 y.o. male who presents today for a new patient visit for shortness of breath and cough precipitated by minimal exertion or talking. He is known to have pulmonary fibrosis and is here today to establish care with [facility]. His condition started 2-3 years ago after a visit to [facility]. It is progressively getting worse. He was treated with prednisone for over three months. Now he is also complaining of back pain. He is unable to sleep flat as this precipitate cough and mucus production. He is unable to drive due to muscle spasms. This is going on over the past 6-8 months. He was on ciprofloxacin for years for osteomyelitis after an accident. 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   He came to [address] [facility] 15 years ago and since he is working in [employer]. Does not recall exposure to chemicals or asbestos. There is no hx of inflammatory arthritis or rash, however he is slowly developing finger clubbing. He was seen by rheumatology and it was felt his ILD is not due to a rheumatologic condition. Never smoker    Past Medical History:    Past Medical History:   Diagnosis Date    Amputation of leg below knee, right, traumatic, complicated, subsequent encounter     MTB (Mycobacterium tuberculosis infection) 06/23/2021    Pulmonary fibrosis ([facility] [address]        Social History:    Social History     Tobacco Use   Smoking Status Never Smoker   Smokeless Tobacco Never Used       Family History:  No family history on file.     Current Medications:  Current Outpatient Medications on File Prior to Visit   Medication Sig Dispense Refill    sennosides-docusate sodium (SENOKOT-S) 8.6-50 MG tablet Take 1 tablet by mouth 2 times daily 60 tablet 2    ethambutol (MYAMBUTOL) 400 MG tablet Take 3 tablets by mouth daily 30 tablet 4    metoclopramide (REGLAN) 10 MG tablet Take 1 tablet by mouth 4 times daily as needed (nausea) 30 tablet 0    ondansetron (ZOFRAN-ODT) 4 MG disintegrating tablet Take 1 tablet by mouth every 8 hours as needed for Nausea or Vomiting 30 tablet 1    vitamin B-6 (PYRIDOXINE) 50 MG tablet Take 50 mg by mouth daily      ondansetron (ZOFRAN) 4 MG tablet Take 1 tablet by mouth daily as needed for Nausea or Vomiting 30 tablet 0    famotidine (PEPCID) 20 MG tablet Take 1 tablet by mouth 2 times daily 60 tablet 3    sertraline (ZOLOFT) 25 MG tablet Take 1 tablet by mouth daily 30 tablet 5    Cholecalciferol (VITAMIN D3 PO) Take by mouth      Artificial Saliva (MOUTH KOTE) SOLN solution Take 1 each by mouth as needed (dry mouth) (Patient not taking: Reported on 8/23/2021) 236 mL 2    Artificial Saliva (SALIVAMAX) PACK Take 1 Package by mouth 3 times daily (Patient not taking: Reported on 8/23/2021) 120 each 1    Artificial Saliva (MOUTH KOTE) SOLN solution Take 1 each by mouth as needed (dry mouth) (Patient not taking: Reported on 8/23/2021) 236 mL 5    BIOTIN PO Take by mouth daily (Patient not taking: Reported on 8/23/2021)       Current Facility-Administered Medications on File Prior to Visit   Medication Dose Route Frequency Provider Last Rate Last Admin    oxyCODONE-acetaminophen (PERCOCET) 5-325 MG per tablet 1 tablet  1 tablet Oral Q8H PRN Anton Cifuentes MD   1 tablet at 05/21/21 1409    benzonatate (TESSALON) capsule 100 mg  100 mg Oral TID PRN Henrique Renner MD   100 mg at 05/21/21 1618       REVIEW OF SYSTEMS:    Review of Systems   Constitutional: Negative for chills, fatigue and fever. HENT: Negative for congestion. Respiratory: Positive for cough and shortness of breath (on exertion ). Negative for chest tightness and wheezing. Cardiovascular: Negative for chest pain, palpitations and leg swelling. Neurological: Negative for weakness. Psychiatric/Behavioral: The patient is not nervous/anxious. All other systems reviewed and are negative. Objective:   PHYSICAL EXAM:        VITALS:  Pulse 94   Temp 96.6 °F (35.9 °C) (Infrared)   Ht 5' 7\" (1.702 m)   Wt 130 lb (59 kg)   SpO2 100%   BMI 20.36 kg/m²     Physical Exam  Vitals reviewed. Constitutional:       Appearance: He is well-developed. HENT:      Head: Normocephalic and atraumatic. Eyes:      Extraocular Movements: Extraocular movements intact. Pupils: Pupils are equal, round, and reactive to light. Neck:      Vascular: No JVD. Cardiovascular:      Rate and Rhythm: Normal rate and regular rhythm. Heart sounds: No murmur heard. Pulmonary:      Effort: Pulmonary effort is normal. No respiratory distress. Breath sounds: No stridor. Rales (bibasilar rales) present. No wheezing. Abdominal:      General: Bowel sounds are normal.      Palpations: Abdomen is soft. Musculoskeletal:         General: No deformity. reaction with focal necrosis in        adjacent parenchyma.      - Remaining specimen with lymphoid infiltrate, favor chronic        nonspecific inflammation.      - No evidence of malignancy. Assessment:      Diagnosis Orders   1. Pulmonary tuberculosis     2. IPF (idiopathic pulmonary fibrosis) (HCC)         Plan:   77year old male with ILD. Prior CT scan is indeterminate for UIP. No definite rheumatologic condition. CRP was low in 2018. He was on cipro for years for apparently chronic osteomyelitis, however he stopped it in 2000 after amputation of RLE. Now he walks with prosthesis and has reasonable functional capacity. CT scan on 4/7/21 compared to prior CT scan on 9/8/2020. It is consistent with progressive UIP   New RUY mass like density was biopsied percutaneously. This was complicated by pneumothorax requiring hospitalization. It was negative for malignancy. There was non caseating granulomas with giant cells and negative for fungal elements. After that he had bronch with BAL that was positive for TB, meanwhile this RUY density progressed to cavity. He is followed by the health department. He is currently on INH, rifampin, ethambutol and levofloxacin. Earlier in the course of his illness he developed drug induced hepatitis for which anti TB medication were interrupted for brief period. At this time SOB is much better. He is on 2 liters and stable. (Had PFT show severe restriction with TLC of 41. FEV1 is 58% predicted. DLCO is 33% predicted. Sed rate and CRP are mildly elevated at 31 and 8.2  Hypersensitivity panel is negative   NICKY with reflex is negative   NICKY is < 1:20  RF is < 10)    Plan   Portable concentrator at 4 liters pulse O2  Counseled to maintain weight and nutritional diet   If new sputum sample that was obtained yesterday by the health department comes back negative will consider restarting rehab. He will need a ride though. Hold on Esbriet for now. Will consider restarting it when he is down to 2 ABX or after finishing TB treatment.

## 2021-08-31 LAB
AFB CULTURE (MYCOBACTERIA): ABNORMAL
AFB CULTURE (MYCOBACTERIA): ABNORMAL
AFB SMEAR: ABNORMAL
Lab: NORMAL
ORGANISM: ABNORMAL
ORGANISM: ABNORMAL
REPORT: NORMAL
THIS TEST SENT TO: NORMAL

## 2021-09-07 ENCOUNTER — TELEPHONE (OUTPATIENT)
Dept: PRIMARY CARE CLINIC | Age: 66
End: 2021-09-07

## 2021-09-07 DIAGNOSIS — R25.2 CRAMPING OF FEET: Primary | ICD-10-CM

## 2021-09-07 NOTE — TELEPHONE ENCOUNTER
Pt malka stats pt is having a lot of hand spasms. That has been going on her a while now. PT was only having a few a day but now it is to where pt can not put his hand straight out and they are happening 10-15 every hour. Pt malka would like to know what to do.

## 2021-09-07 NOTE — TELEPHONE ENCOUNTER
----- Message from Jese Sidhu sent at 9/3/2021  3:15 PM EDT -----  Subject: Message to Provider    QUESTIONS  Information for Provider? Hebert Glynn would like for you to call him about his   uncles hand spasms please. Thanks  ---------------------------------------------------------------------------  --------------  Abdoul Muse INFO  What is the best way for the office to contact you? OK to leave message on   voicemail  Preferred Call Back Phone Number? [phone]  ---------------------------------------------------------------------------  --------------  SCRIPT ANSWERS  Relationship to Patient? Other  Representative Name? Hebert Glynn  Is the Representative on the appropriate HIPAA document in Epic?  Yes

## 2021-09-07 NOTE — TELEPHONE ENCOUNTER
I would like to check a renal panel on him to make sure his labs are okay. Otherwise can we schedule him an appointment for next week?   I will call if anything comes back normal.

## 2021-09-10 ENCOUNTER — OFFICE VISIT (OUTPATIENT)
Dept: PRIMARY CARE CLINIC | Age: 66
End: 2021-09-10
Payer: MEDICARE

## 2021-09-10 ENCOUNTER — CARE COORDINATION (OUTPATIENT)
Dept: CARE COORDINATION | Age: 66
End: 2021-09-10

## 2021-09-10 VITALS
BODY MASS INDEX: 20.37 KG/M2 | DIASTOLIC BLOOD PRESSURE: 67 MMHG | WEIGHT: 129.8 LBS | SYSTOLIC BLOOD PRESSURE: 103 MMHG | HEIGHT: 67 IN | HEART RATE: 97 BPM

## 2021-09-10 DIAGNOSIS — G47.01 INSOMNIA DUE TO MEDICAL CONDITION: ICD-10-CM

## 2021-09-10 DIAGNOSIS — E44.0 MODERATE MALNUTRITION (HCC): Chronic | ICD-10-CM

## 2021-09-10 DIAGNOSIS — J96.11 CHRONIC RESPIRATORY FAILURE WITH HYPOXIA (HCC): ICD-10-CM

## 2021-09-10 DIAGNOSIS — R25.2 CRAMPING OF HANDS: ICD-10-CM

## 2021-09-10 DIAGNOSIS — K59.00 CONSTIPATION, UNSPECIFIED CONSTIPATION TYPE: ICD-10-CM

## 2021-09-10 DIAGNOSIS — J84.112 IPF (IDIOPATHIC PULMONARY FIBROSIS) (HCC): Primary | ICD-10-CM

## 2021-09-10 PROCEDURE — 1123F ACP DISCUSS/DSCN MKR DOCD: CPT | Performed by: STUDENT IN AN ORGANIZED HEALTH CARE EDUCATION/TRAINING PROGRAM

## 2021-09-10 PROCEDURE — G8427 DOCREV CUR MEDS BY ELIG CLIN: HCPCS | Performed by: STUDENT IN AN ORGANIZED HEALTH CARE EDUCATION/TRAINING PROGRAM

## 2021-09-10 PROCEDURE — 1036F TOBACCO NON-USER: CPT | Performed by: STUDENT IN AN ORGANIZED HEALTH CARE EDUCATION/TRAINING PROGRAM

## 2021-09-10 PROCEDURE — G8420 CALC BMI NORM PARAMETERS: HCPCS | Performed by: STUDENT IN AN ORGANIZED HEALTH CARE EDUCATION/TRAINING PROGRAM

## 2021-09-10 PROCEDURE — 3017F COLORECTAL CA SCREEN DOC REV: CPT | Performed by: STUDENT IN AN ORGANIZED HEALTH CARE EDUCATION/TRAINING PROGRAM

## 2021-09-10 PROCEDURE — 99214 OFFICE O/P EST MOD 30 MIN: CPT | Performed by: STUDENT IN AN ORGANIZED HEALTH CARE EDUCATION/TRAINING PROGRAM

## 2021-09-10 PROCEDURE — 4040F PNEUMOC VAC/ADMIN/RCVD: CPT | Performed by: STUDENT IN AN ORGANIZED HEALTH CARE EDUCATION/TRAINING PROGRAM

## 2021-09-10 RX ORDER — HYDROXYZINE PAMOATE 25 MG/1
25 CAPSULE ORAL 3 TIMES DAILY PRN
Qty: 60 CAPSULE | Refills: 2 | Status: SHIPPED | OUTPATIENT
Start: 2021-09-10 | End: 2021-10-10

## 2021-09-10 RX ORDER — POLYETHYLENE GLYCOL 3350 17 G/17G
17 POWDER, FOR SOLUTION ORAL DAILY PRN
Qty: 289 G | Refills: 5 | Status: SHIPPED | OUTPATIENT
Start: 2021-09-10 | End: 2021-10-10

## 2021-09-10 ASSESSMENT — ENCOUNTER SYMPTOMS
NAUSEA: 0
DIARRHEA: 0
CHEST TIGHTNESS: 1
ANAL BLEEDING: 0
SHORTNESS OF BREATH: 1
WHEEZING: 0
CONSTIPATION: 1

## 2021-09-10 NOTE — PROGRESS NOTES
9/10/2021     Antonio Swann (:  1955) is a 74 y.o. male, here for evaluation of the following medical concerns:    HPI   Insomnia:  Antonio Swann is a 74 y.o. male who complains of insomnia. Onset was 2 weeks ago. Patient describes symptoms as difficulty falling asleep. Patient has found no relief with avoiding heavy meal before bedtime, avoiding long naps during the day, avoiding use of electronic devices before bedtime, decreasing caffeine consumption, going to sleep at the same time each night and melatonin use. Associated symptoms include: none. Patient denies anxiety, depression, fatigue and leg cramps. Symptoms have stabilized. Hand cramps: Patient also presents of cramping of his hands. Onset started about 2 months ago. Patient describes symptoms as cramping of his hands when he lies in bed at night and tries to use his phone. This primarily involves his hands; rarely his left lower extremity. Constipation: Patient complains of constipation. Onset was several months ago. Patient has been having frequent pellet like stools per week. Defecation has been difficult. Co-Morbid conditions:recent dehydration/illness and stress. Symptoms have gradually worsened. Current Health Habits: Eating fiber? yes, Exercise? Limited by his IPF, Adequate hydration? yes -4x16 ounce bottles of water while awake. Current over the counter/prescription laxative: None    Review of Systems   Respiratory: Positive for chest tightness and shortness of breath (Baseline). Negative for wheezing. Cardiovascular: Negative for chest pain. Gastrointestinal: Positive for constipation. Negative for anal bleeding, diarrhea and nausea. Musculoskeletal: Positive for myalgias (Hand cramps). Psychiatric/Behavioral: Positive for sleep disturbance. Negative for self-injury and suicidal ideas. The patient is not nervous/anxious. Prior to Visit Medications    Medication Sig Taking?  Authorizing Provider hydrOXYzine (VISTARIL) 25 MG capsule Take 1 capsule by mouth 3 times daily as needed for Itching (sleep) Yes Abhijit Chin DO   polyethylene glycol (GLYCOLAX) 17 GM/SCOOP powder Take 17 g by mouth daily as needed (constipation) Yes Abhijit Chin DO   sennosides-docusate sodium (SENOKOT-S) 8.6-50 MG tablet Take 1 tablet by mouth 2 times daily Yes Abhijit Chin DO   ethambutol (MYAMBUTOL) 400 MG tablet Take 3 tablets by mouth daily Yes Sana Mcfadden MD   vitamin B-6 (PYRIDOXINE) 50 MG tablet Take 50 mg by mouth daily Yes Historical Provider, MD   Cholecalciferol (VITAMIN D3 PO) Take by mouth Yes Historical Provider, MD        Social History     Tobacco Use    Smoking status: Never Smoker    Smokeless tobacco: Never Used   Substance Use Topics    Alcohol use: Yes     Alcohol/week: 1.0 standard drinks     Types: 1 Cans of beer per week        Vitals:    09/10/21 1003   BP: 103/67   Site: Right Upper Arm   Position: Sitting   Cuff Size: Small Adult   Pulse: 97   Weight: 129 lb 12.8 oz (58.9 kg)   Height: 5' 7\" (1.702 m)     Estimated body mass index is 20.33 kg/m² as calculated from the following:    Height as of this encounter: 5' 7\" (1.702 m). Weight as of this encounter: 129 lb 12.8 oz (58.9 kg). Physical Exam  Constitutional:       General: He is not in acute distress. Appearance: Normal appearance. He is normal weight. He is not ill-appearing. HENT:      Head: Normocephalic and atraumatic. Right Ear: Tympanic membrane normal.      Left Ear: Tympanic membrane normal.      Nose: Nose normal.      Mouth/Throat:      Mouth: Mucous membranes are moist.      Pharynx: Oropharynx is clear. Eyes:      Conjunctiva/sclera: Conjunctivae normal.   Cardiovascular:      Rate and Rhythm: Regular rhythm. Tachycardia present. Pulmonary:      Effort: Pulmonary effort is normal. No respiratory distress. Breath sounds: No wheezing or rales.       Comments: Diminished breath sounds throughout  Abdominal: General: Abdomen is flat. Bowel sounds are normal.      Palpations: Abdomen is soft. Musculoskeletal:         General: Deformity (Right lower extremity amputation) present. Lymphadenopathy:      Cervical: No cervical adenopathy. Skin:     General: Skin is warm. Neurological:      Mental Status: He is alert. Mental status is at baseline. Psychiatric:         Mood and Affect: Mood normal.         ASSESSMENT/PLAN:  1. IPF (idiopathic pulmonary fibrosis) ([facility] [address]): Follows with pulmonology. On home O2. 2 L/min. On transplant list.    2. Chronic respiratory failure with hypoxia ([facility] [address]): Needs an oxygen concentrator as the canister is too heavy. Working on getting this approved. 3. Moderate malnutrition ([facility] [address]): Appetite has improved over the past few weeks. Does have some electrolyte abnormalities, which could be related to this or his liver dysfunction and hypoalbuminemia. 4. Insomnia due to medical condition: Having some insomnia and nocturnal itching, which I think could be related to hyperbilirubinemia given his liver dysfunction. Will try Vistaril as the anticholinergic effects may help with sleep while the antihistamines may help with itching.  - hydrOXYzine (VISTARIL) 25 MG capsule; Take 1 capsule by mouth 3 times daily as needed for Itching (sleep)  Dispense: 60 capsule; Refill: 2    5. Constipation, unspecified constipation type: Unsure if his hand cramping is related to nocturnal hypoxia or his mild hyperkalemia. Question if his hyperkalemia could be exacerbated by the fact that he has about 1 bowel movement per 10 days. We will do a MiraLAX cleanout and then establish a bowel regimen of GlycoLax twice daily. They will call if he does not have a bowel movement after the MiraLAX cleanout. - polyethylene glycol (GLYCOLAX) 17 GM/SCOOP powder; Take 17 g by mouth daily as needed (constipation)  Dispense: 289 g; Refill: 5  - COMPREHENSIVE METABOLIC PANEL; Future    6.  Cramping of hands: Could be related to electrolyte abnormalities. Trying to reconcile those, but no obvious cause. Question if it could be related to nocturnal hypoxia. Will have them check his O2 sats during a cramping episode and if low increase his oxygen from 2 to 3 L. Return in about 1 week (around 9/17/2021). An electronic signature was used to authenticate this note.     --Sonia Stevens, DO on 9/10/2021 at 11:51 AM

## 2021-09-20 ENCOUNTER — TELEPHONE (OUTPATIENT)
Dept: PULMONOLOGY | Age: 66
End: 2021-09-20

## 2021-09-20 DIAGNOSIS — R09.02 HYPOXIA: Primary | ICD-10-CM

## 2021-09-20 NOTE — TELEPHONE ENCOUNTER
Davian Casillas, calls reporting that Mr. Alford Ang cough and dyspnea have worsened over past 3 weeks. He is SOB on 3.5 LPM  - 4 LPM with minimal exertion. His O2 need has increased to 5 LPM.  Lala Aleman has limited times that he can bring him for appt. His next scheduled appt is 10/18/2021. Please call Lala Aleman to discuss what to do.   [phone]. It is okay to leave message if he cannot answer.

## 2021-09-21 ENCOUNTER — TELEPHONE (OUTPATIENT)
Dept: PULMONOLOGY | Age: 66
End: 2021-09-21

## 2021-09-21 NOTE — TELEPHONE ENCOUNTER
MARIELAI:  Dr Nidia Mcmillan from [address] [place] [address] wanted Alex Kahn (RN) to let you know that Mr Elise Gómez is coughing and having SOB, He is on 4L of O2. He was not seen but referred to ER, he does state that he will not have a ride until 5:00, was told if he gets worse to call 911.

## 2021-09-23 ENCOUNTER — APPOINTMENT (OUTPATIENT)
Dept: CT IMAGING | Age: 66
End: 2021-09-23
Payer: MEDICARE

## 2021-09-23 ENCOUNTER — HOSPITAL ENCOUNTER (EMERGENCY)
Age: 66
Discharge: HOME | End: 2021-09-23
Attending: EMERGENCY MEDICINE
Payer: MEDICARE

## 2021-09-23 VITALS
HEART RATE: 103 BPM | HEIGHT: 67 IN | SYSTOLIC BLOOD PRESSURE: 103 MMHG | BODY MASS INDEX: 19.62 KG/M2 | OXYGEN SATURATION: 98 % | RESPIRATION RATE: 27 BRPM | WEIGHT: 125 LBS | TEMPERATURE: 97.6 F | DIASTOLIC BLOOD PRESSURE: 63 MMHG

## 2021-09-23 DIAGNOSIS — Z86.11 HISTORY OF TB (TUBERCULOSIS): ICD-10-CM

## 2021-09-23 DIAGNOSIS — J96.11 CHRONIC RESPIRATORY FAILURE WITH HYPOXIA (HCC): ICD-10-CM

## 2021-09-23 DIAGNOSIS — J84.112 IDIOPATHIC PULMONARY FIBROSIS (HCC): Primary | ICD-10-CM

## 2021-09-23 LAB
A/G RATIO: 0.4 (ref 1.1–2.2)
ALBUMIN SERPL-MCNC: 2.5 G/DL (ref 3.4–5)
ALP BLD-CCNC: 197 U/L (ref 40–129)
ALT SERPL-CCNC: 21 U/L (ref 10–40)
ANION GAP SERPL CALCULATED.3IONS-SCNC: 5 MMOL/L (ref 3–16)
AST SERPL-CCNC: 60 U/L (ref 15–37)
BASE EXCESS VENOUS: 3.6 MMOL/L (ref -3–3)
BASOPHILS ABSOLUTE: 0 K/UL (ref 0–0.2)
BASOPHILS RELATIVE PERCENT: 0.2 %
BILIRUB SERPL-MCNC: 0.8 MG/DL (ref 0–1)
BUN BLDV-MCNC: 9 MG/DL (ref 7–20)
CALCIUM SERPL-MCNC: 8.7 MG/DL (ref 8.3–10.6)
CHLORIDE BLD-SCNC: 97 MMOL/L (ref 99–110)
CO2: 28 MMOL/L (ref 21–32)
CREAT SERPL-MCNC: 0.6 MG/DL (ref 0.8–1.3)
D DIMER: 214 NG/ML DDU (ref 0–229)
EOSINOPHILS ABSOLUTE: 0 K/UL (ref 0–0.6)
EOSINOPHILS RELATIVE PERCENT: 0.3 %
GFR AFRICAN AMERICAN: >60
GFR NON-AFRICAN AMERICAN: >60
GLOBULIN: 5.7 G/DL
GLUCOSE BLD-MCNC: 97 MG/DL (ref 70–99)
HCO3 VENOUS: 29.3 MMOL/L (ref 23–29)
HCT VFR BLD CALC: 32.7 % (ref 40.5–52.5)
HEMOGLOBIN: 11 G/DL (ref 13.5–17.5)
LACTIC ACID: 1.8 MMOL/L (ref 0.4–2)
LYMPHOCYTES ABSOLUTE: 1.2 K/UL (ref 1–5.1)
LYMPHOCYTES RELATIVE PERCENT: 16 %
MCH RBC QN AUTO: 32.4 PG (ref 26–34)
MCHC RBC AUTO-ENTMCNC: 33.5 G/DL (ref 31–36)
MCV RBC AUTO: 96.8 FL (ref 80–100)
MONOCYTES ABSOLUTE: 1.1 K/UL (ref 0–1.3)
MONOCYTES RELATIVE PERCENT: 14.1 %
NEUTROPHILS ABSOLUTE: 5.3 K/UL (ref 1.7–7.7)
NEUTROPHILS RELATIVE PERCENT: 69.4 %
O2 SAT, VEN: 51 %
O2 THERAPY: ABNORMAL
PCO2, VEN: 53.9 MMHG (ref 40–50)
PDW BLD-RTO: 19.3 % (ref 12.4–15.4)
PH VENOUS: 7.34 (ref 7.35–7.45)
PLATELET # BLD: 308 K/UL (ref 135–450)
PMV BLD AUTO: 7.8 FL (ref 5–10.5)
PO2, VEN: 29.4 MMHG (ref 25–40)
POTASSIUM REFLEX MAGNESIUM: 4.5 MMOL/L (ref 3.5–5.1)
PRO-BNP: 127 PG/ML (ref 0–124)
RBC # BLD: 3.38 M/UL (ref 4.2–5.9)
SODIUM BLD-SCNC: 130 MMOL/L (ref 136–145)
TCO2 CALC VENOUS: 30 MMOL/L
TOTAL PROTEIN: 8.2 G/DL (ref 6.4–8.2)
TROPONIN: <0.01 NG/ML
WBC # BLD: 7.6 K/UL (ref 4–11)

## 2021-09-23 PROCEDURE — 93005 ELECTROCARDIOGRAM TRACING: CPT | Performed by: EMERGENCY MEDICINE

## 2021-09-23 PROCEDURE — 85025 COMPLETE CBC W/AUTO DIFF WBC: CPT

## 2021-09-23 PROCEDURE — 6360000002 HC RX W HCPCS: Performed by: EMERGENCY MEDICINE

## 2021-09-23 PROCEDURE — 85379 FIBRIN DEGRADATION QUANT: CPT

## 2021-09-23 PROCEDURE — 2580000003 HC RX 258: Performed by: EMERGENCY MEDICINE

## 2021-09-23 PROCEDURE — U0005 INFEC AGEN DETEC AMPLI PROBE: HCPCS

## 2021-09-23 PROCEDURE — 6360000004 HC RX CONTRAST MEDICATION: Performed by: EMERGENCY MEDICINE

## 2021-09-23 PROCEDURE — 99285 EMERGENCY DEPT VISIT HI MDM: CPT

## 2021-09-23 PROCEDURE — 80053 COMPREHEN METABOLIC PANEL: CPT

## 2021-09-23 PROCEDURE — 94761 N-INVAS EAR/PLS OXIMETRY MLT: CPT

## 2021-09-23 PROCEDURE — 83880 ASSAY OF NATRIURETIC PEPTIDE: CPT

## 2021-09-23 PROCEDURE — 82803 BLOOD GASES ANY COMBINATION: CPT

## 2021-09-23 PROCEDURE — 71275 CT ANGIOGRAPHY CHEST: CPT

## 2021-09-23 PROCEDURE — 6370000000 HC RX 637 (ALT 250 FOR IP): Performed by: EMERGENCY MEDICINE

## 2021-09-23 PROCEDURE — U0003 INFECTIOUS AGENT DETECTION BY NUCLEIC ACID (DNA OR RNA); SEVERE ACUTE RESPIRATORY SYNDROME CORONAVIRUS 2 (SARS-COV-2) (CORONAVIRUS DISEASE [COVID-19]), AMPLIFIED PROBE TECHNIQUE, MAKING USE OF HIGH THROUGHPUT TECHNOLOGIES AS DESCRIBED BY CMS-2020-01-R: HCPCS

## 2021-09-23 PROCEDURE — 2700000000 HC OXYGEN THERAPY PER DAY

## 2021-09-23 PROCEDURE — 84484 ASSAY OF TROPONIN QUANT: CPT

## 2021-09-23 PROCEDURE — 83605 ASSAY OF LACTIC ACID: CPT

## 2021-09-23 PROCEDURE — 94664 DEMO&/EVAL PT USE INHALER: CPT

## 2021-09-23 PROCEDURE — 96374 THER/PROPH/DIAG INJ IV PUSH: CPT

## 2021-09-23 PROCEDURE — 94640 AIRWAY INHALATION TREATMENT: CPT

## 2021-09-23 RX ORDER — LEVOFLOXACIN 750 MG/1
750 TABLET ORAL DAILY
Status: ON HOLD | COMMUNITY
End: 2021-10-19 | Stop reason: HOSPADM

## 2021-09-23 RX ORDER — IPRATROPIUM BROMIDE AND ALBUTEROL SULFATE 2.5; .5 MG/3ML; MG/3ML
1 SOLUTION RESPIRATORY (INHALATION) ONCE
Status: COMPLETED | OUTPATIENT
Start: 2021-09-23 | End: 2021-09-23

## 2021-09-23 RX ORDER — 0.9 % SODIUM CHLORIDE 0.9 %
500 INTRAVENOUS SOLUTION INTRAVENOUS ONCE
Status: COMPLETED | OUTPATIENT
Start: 2021-09-23 | End: 2021-09-23

## 2021-09-23 RX ORDER — ISONIAZID 300 MG/1
300 TABLET ORAL DAILY
COMMUNITY

## 2021-09-23 RX ORDER — PREDNISONE 20 MG/1
20 TABLET ORAL 2 TIMES DAILY
Qty: 10 TABLET | Refills: 0 | Status: SHIPPED | OUTPATIENT
Start: 2021-09-23 | End: 2021-09-28

## 2021-09-23 RX ORDER — METHYLPREDNISOLONE SODIUM SUCCINATE 125 MG/2ML
80 INJECTION, POWDER, LYOPHILIZED, FOR SOLUTION INTRAMUSCULAR; INTRAVENOUS ONCE
Status: COMPLETED | OUTPATIENT
Start: 2021-09-23 | End: 2021-09-23

## 2021-09-23 RX ORDER — ALBUTEROL SULFATE 2.5 MG/3ML
2.5 SOLUTION RESPIRATORY (INHALATION) ONCE
Status: COMPLETED | OUTPATIENT
Start: 2021-09-23 | End: 2021-09-23

## 2021-09-23 RX ADMIN — IOPAMIDOL 80 ML: 755 INJECTION, SOLUTION INTRAVENOUS at 17:56

## 2021-09-23 RX ADMIN — ALBUTEROL SULFATE 2.5 MG: 2.5 SOLUTION RESPIRATORY (INHALATION) at 17:13

## 2021-09-23 RX ADMIN — IPRATROPIUM BROMIDE AND ALBUTEROL SULFATE 1 AMPULE: .5; 3 SOLUTION RESPIRATORY (INHALATION) at 17:12

## 2021-09-23 RX ADMIN — METHYLPREDNISOLONE SODIUM SUCCINATE 80 MG: 125 INJECTION, POWDER, FOR SOLUTION INTRAMUSCULAR; INTRAVENOUS at 17:09

## 2021-09-23 RX ADMIN — SODIUM CHLORIDE 500 ML: 9 INJECTION, SOLUTION INTRAVENOUS at 19:10

## 2021-09-23 RX ADMIN — SODIUM CHLORIDE 500 ML: 9 INJECTION, SOLUTION INTRAVENOUS at 17:12

## 2021-09-23 NOTE — ED PROVIDER NOTES
CRITICAL CARE NOTE  There was a high probability of clinical significant / life threatening deterioration in the patient's condition which required my urgent intervention. Total critical care time was at least 21  minutes excluding any separately reported procedures. TRIAGE CHIEF COMPLAINT:   Chief Complaint   Patient presents with    Shortness of Breath     feeling sob for 5-6 days. +non prod cough -fever denies chest pain. dx with TB July 2021, uses O2 prn. today, been using O2 at 4 liters. has received both Pfizer vaccines. pt coughing non prod       HPI: Karla López is a 74 y.o. male who presents to the emergency department with complaint of feeling increased shortness of breath especially dyspnea on exertion for about 6 days. He has had a dry cough. No fever or chills. Denies chest pain. No pedal edema. The patient has a history of IPF diagnosed in May. Since that time he has been on home oxygen as needed but for the past 8 days has been using it constantly at 4 L/min. He was diagnosed with active TB in July of this year and is on triple drug therapy. He apparently was started on Levaquin around September 16. The patient completed Pfizer COVID-19 vaccine in March of this year. No known COVID-19 exposure. He denies taking steroids. He does have a past history of hyponatremia and transaminitis. He has a past history of traumatic right below the knee amputation. REVIEW OF SYSTEMS:   10 systems reviewed. Pertinent positives per HPI. Otherwise noted to be negative. I have reviewed the triage/nursing documentation and agree unless otherwise noted below.     PAST MEDICAL HISTORY:   Past Medical History:   Diagnosis Date    Amputation of leg below knee, right, traumatic, complicated, subsequent encounter     MTB (Mycobacterium tuberculosis infection) 06/23/2021    Pulmonary fibrosis ([facility] [address]         CURRENT MEDICATIONS:   Patient's Medications   New Prescriptions    No medications on file rate 105. Recheck blood pressure was 102/68. HENT: Normocephalic, Atraumatic Oropharynx moist, No oral exudates. TMs are normal.  Eyes:  PERRL, EOMI, Conjunctiva normal, No discharge. Neck: No tenderness, Supple, No lymphadenopathy, No stridor. Cardiovascular:  Normal heart rate, Normal rhythm, No murmurs, No rubs, No gallops. Pulmonary/Chest: Inspiratory crackles heard throughout. He has mild bilateral expiratory wheezes. Abdomen:   Soft, No tenderness, No masses, No pulsatile masses  Back:  No tenderness, No CVA tenderness  Extremities:  Normal range of motion, Intact distal pulses, No edema, No tenderness. Right below the knee amputation noted. Neurologic:  Alert & oriented x 3, Speech is clear and appropriate, No upper extremity drift or lower extremity weakness,  Normal sensory function, No facial asymmetry, no truncal or extremity ataxia. Normal gait. Skin:  Warm, Dry, No erythema, No rash  Psychiatric:  Affect normal, Mood normal      EKG:    EKG interpreted by myself. Normal sinus rhythm at a rate of 86. Axis is 7 degrees. There is no ischemia. No ectopy noted. QTC is 452. Radiology:  CTA PULMONARY W CONTRAST   Final Result      No evidence for pulmonary embolism. However motion limits exam in evaluating segmental and subsegmental arteries.       Extensive pulmonary fibrosis and interstitial lung disease, stable          LAB  Labs Reviewed   CBC WITH AUTO DIFFERENTIAL - Abnormal; Notable for the following components:       Result Value    RBC 3.38 (*)     Hemoglobin 11.0 (*)     Hematocrit 32.7 (*)     RDW 19.3 (*)     All other components within normal limits    Narrative:     Performed at:  [facility]) [facility]  [address], [place] [facility] [address]   Phone [phone]   COMPREHENSIVE METABOLIC PANEL W/ REFLEX TO MG FOR LOW K - Abnormal; Notable for the following components:    Sodium 130 (*)     Chloride 97 (*)     CREATININE 0.6 (*)     Albumin 2.5 (*)     Albumin/Globulin Ratio 0.4 (*)     Alkaline Phosphatase 197 (*)     AST 60 (*)     All other components within normal limits    Narrative:     Performed at:  [facility]  [address] [place] [facility] [place]   Phone [phone]   BRAIN NATRIURETIC PEPTIDE - Abnormal; Notable for the following components:    Pro- (*)     All other components within normal limits    Narrative:     Performed at:  [facility] [address] [place] [facility] [place]   Phone [phone]   BLOOD GAS, VENOUS - Abnormal; Notable for the following components:    pH, Willian 7.344 (*)     pCO2, Willian 53.9 (*)     HCO3, Venous 29.3 (*)     Base Excess, Willian 3.6 (*)     All other components within normal limits    Narrative:     Performed at:  [facility]  [facility] [address] [place], [place] [address] [place]   Phone [phone]   TROPONIN    Narrative:     Performed at:  [facility] [place]  [place] [facility] [place]   Phone [phone]   D-DIMER, QUANTITATIVE    Narrative:     Performed at:  [facility]  [facility] [place] [facility] [place]   Phone [phone]   LACTIC ACID, PLASMA    Narrative:     Performed at:  [facility]  [facility] [place] [facility] [place]   Phone [phone]       ED COURSE & MEDICAL DECISION MAKING:  Pertinent Labs & Imaging studies reviewed. (See chart for details)  78-year-old male diagnosed with IPF in May, on as needed oxygen since then, history of active TB in July of this year currently on triple drug therapy with 5 to 6-day history of shortness of breath and especially dyspnea on exertion associated with dry cough. He has had to use his home oxygen constantly for the past 8 days. Denies fever or chills. No pedal edema. No chest pain.   He was vaccinated against Covid with Kinney Demeure vaccine in March. He is not currently on steroids. He apparently was started on Levaquin on September 16. The patient has had prescription for a CTA pulmonary study with contrast to be done tomorrow. Afebrile with heart rate 105 and O2 sat 100% on 4 L of oxygen. Blood pressure is 102/68. Inspiratory crackles heard throughout with some expiratory wheezes. Abdomen is benign. No pedal edema. Nebulized bronchodilators were ordered along with Solu-Medrol. COVID-19 PCR test was sent and is pending. He was given a small IV fluid bolus 500 cc of normal saline. WBC was 7.6 and hemoglobin 11.0. D-dimer was normal.  VBG shows pH 7.34, PCO2 54 and PO2 29.4. Lactic acid was normal. Sodium was 130 with chloride 97. BUN and creatinine were normal. Alk phos was 197. ALT and AST were 21 and 60 respectively. Troponin was normal. proBNP was 127. CTA pulmonary with contrast study read by the radiologist and reviewed by myself shows no evidence for pulmonary embolism. Patient has stable severe pulmonary fibrosis. No acute abnormality. I see no evidence for DVT/PE, coronary ischemia, dissection, CHF, pneumothorax or pneumonia. Patient was given IV fluids here when systolic blood pressure dropped to 96. Options were discussed with the patient for disposition and he states he wants to go home. He has an appointment to see his pulmonologist.  I recommended he call tomorrow to let his pulmonologist know that he came to the ED and that he had the CT scan done. Recommended he continue with his current medications and oxygen as needed. He was given a short course of prednisone in hopes this may help with his symptoms. The patient tells me that bronchodilators have not helped him in the past.        I discussed with Ramesh Solomon the results of the evaluation in the Emergency Department, diagnosis, care, prognosis and the importance of follow-up.   The patient is stable for discharge. The patient and/or family are in agreement with the plan and all questions have been answered. Specific discharge instructions were explained, including reasons to return to the emergency department.           (Please note that portions of this note may have been completed with a voice recognition program.  Efforts were made to edit the dictation but occasionally words are mis-transcribed)      FINAL IMPRESSION:  1 --idiopathic pulmonary fibrosis  2 --chronic respiratory failure with hypoxia  3 --history of TB                Dione Juarez MD  09/23/21 1934

## 2021-09-24 ENCOUNTER — CARE COORDINATION (OUTPATIENT)
Dept: CARE COORDINATION | Age: 66
End: 2021-09-24

## 2021-09-24 LAB
EKG ATRIAL RATE: 86 BPM
EKG DIAGNOSIS: NORMAL
EKG P AXIS: 12 DEGREES
EKG P-R INTERVAL: 170 MS
EKG Q-T INTERVAL: 378 MS
EKG QRS DURATION: 70 MS
EKG QTC CALCULATION (BAZETT): 452 MS
EKG R AXIS: 7 DEGREES
EKG T AXIS: -2 DEGREES
EKG VENTRICULAR RATE: 86 BPM
SARS-COV-2: NOT DETECTED

## 2021-09-24 PROCEDURE — 93010 ELECTROCARDIOGRAM REPORT: CPT | Performed by: INTERNAL MEDICINE

## 2021-09-24 NOTE — CARE COORDINATION
Patient contacted regarding COVID-19 diagnosis. Discussed COVID-19 related testing which was pending at this time. Test results were pending. Patient informed of results, if available? pending. Ambulatory Care Manager contacted the patient by telephone to perform post discharge assessment. Call within 2 business days of discharge: Yes. Verified name and  with patient as identifiers. Provided introduction to self, and explanation of the CTN/ACM role, and reason for call due to risk factors for infection and/or exposure to COVID-19. Symptoms reviewed with patient who verbalized the following symptoms: fever, cough and shortness of breath. Due to no new or worsening symptoms encounter was not routed to provider for escalation. Discussed follow-up appointments. If no appointment was previously scheduled, appointment scheduling offered: patient has called his pulmonologist and has a follow up appointment. [facility] follow up appointment(s):   Future Appointments   Date Time Provider Oz Alonso   10/18/2021  3:40 PM MD Bridger Mcclure P/CC [facility]     Non-[facility] follow up appointment(s): none    Non-face-to-face services provided:  Obtained and reviewed discharge summary and/or continuity of care documents  Education of patient/family/caregiver/guardian to support self-management-for COVID 19     Advance Care Planning:   Does patient have an Advance Directive:  patient declined education. Educated patient about risk for severe COVID-19 due to risk factors according to CDC guidelines. ACM reviewed discharge instructions, medical action plan and red flag symptoms with the patient who verbalized understanding. Discussed COVID vaccination status: Yes. Education provided on COVID-19 vaccination as appropriate. Discussed exposure protocols and quarantine with CDC Guidelines.  Patient was given an opportunity to verbalize any questions and concerns and agrees to contact ACM or health care provider

## 2021-10-16 ENCOUNTER — HOSPITAL ENCOUNTER (INPATIENT)
Age: 66
LOS: 3 days | Discharge: HOME HEALTH | DRG: 196 | End: 2021-10-19
Attending: EMERGENCY MEDICINE | Admitting: INTERNAL MEDICINE
Payer: MEDICARE

## 2021-10-16 ENCOUNTER — APPOINTMENT (OUTPATIENT)
Dept: GENERAL RADIOLOGY | Age: 66
DRG: 196 | End: 2021-10-16
Payer: MEDICARE

## 2021-10-16 DIAGNOSIS — R06.02 SOB (SHORTNESS OF BREATH) ON EXERTION: ICD-10-CM

## 2021-10-16 DIAGNOSIS — J96.21 ACUTE ON CHRONIC RESPIRATORY FAILURE WITH HYPOXIA (HCC): Primary | ICD-10-CM

## 2021-10-16 PROBLEM — R09.02 HYPOXIA: Status: ACTIVE | Noted: 2021-10-16

## 2021-10-16 LAB
A/G RATIO: 0.5 (ref 1.1–2.2)
ALBUMIN SERPL-MCNC: 2.7 G/DL (ref 3.4–5)
ALP BLD-CCNC: 170 U/L (ref 40–129)
ALT SERPL-CCNC: 18 U/L (ref 10–40)
ANION GAP SERPL CALCULATED.3IONS-SCNC: 6 MMOL/L (ref 3–16)
AST SERPL-CCNC: 55 U/L (ref 15–37)
BASOPHILS ABSOLUTE: 0.1 K/UL (ref 0–0.2)
BASOPHILS RELATIVE PERCENT: 0.8 %
BILIRUB SERPL-MCNC: 0.6 MG/DL (ref 0–1)
BUN BLDV-MCNC: 8 MG/DL (ref 7–20)
CALCIUM SERPL-MCNC: 8.5 MG/DL (ref 8.3–10.6)
CHLORIDE BLD-SCNC: 98 MMOL/L (ref 99–110)
CO2: 28 MMOL/L (ref 21–32)
CREAT SERPL-MCNC: 0.6 MG/DL (ref 0.8–1.3)
EOSINOPHILS ABSOLUTE: 0 K/UL (ref 0–0.6)
EOSINOPHILS RELATIVE PERCENT: 0.5 %
GFR AFRICAN AMERICAN: >60
GFR NON-AFRICAN AMERICAN: >60
GLOBULIN: 5.3 G/DL
GLUCOSE BLD-MCNC: 98 MG/DL (ref 70–99)
HCT VFR BLD CALC: 31.4 % (ref 40.5–52.5)
HEMOGLOBIN: 10.6 G/DL (ref 13.5–17.5)
LYMPHOCYTES ABSOLUTE: 1 K/UL (ref 1–5.1)
LYMPHOCYTES RELATIVE PERCENT: 14 %
MCH RBC QN AUTO: 33.3 PG (ref 26–34)
MCHC RBC AUTO-ENTMCNC: 33.9 G/DL (ref 31–36)
MCV RBC AUTO: 98.3 FL (ref 80–100)
MONOCYTES ABSOLUTE: 0.8 K/UL (ref 0–1.3)
MONOCYTES RELATIVE PERCENT: 10.7 %
NEUTROPHILS ABSOLUTE: 5.5 K/UL (ref 1.7–7.7)
NEUTROPHILS RELATIVE PERCENT: 74 %
PDW BLD-RTO: 18.6 % (ref 12.4–15.4)
PLATELET # BLD: 271 K/UL (ref 135–450)
PMV BLD AUTO: 8 FL (ref 5–10.5)
POTASSIUM REFLEX MAGNESIUM: 4.1 MMOL/L (ref 3.5–5.1)
PRO-BNP: 234 PG/ML (ref 0–124)
RBC # BLD: 3.19 M/UL (ref 4.2–5.9)
SODIUM BLD-SCNC: 132 MMOL/L (ref 136–145)
TOTAL PROTEIN: 8 G/DL (ref 6.4–8.2)
TROPONIN: <0.01 NG/ML
WBC # BLD: 7.4 K/UL (ref 4–11)

## 2021-10-16 PROCEDURE — 2060000000 HC ICU INTERMEDIATE R&B

## 2021-10-16 PROCEDURE — 80053 COMPREHEN METABOLIC PANEL: CPT

## 2021-10-16 PROCEDURE — 84484 ASSAY OF TROPONIN QUANT: CPT

## 2021-10-16 PROCEDURE — 6360000002 HC RX W HCPCS: Performed by: EMERGENCY MEDICINE

## 2021-10-16 PROCEDURE — 6370000000 HC RX 637 (ALT 250 FOR IP)

## 2021-10-16 PROCEDURE — 71045 X-RAY EXAM CHEST 1 VIEW: CPT

## 2021-10-16 PROCEDURE — 99283 EMERGENCY DEPT VISIT LOW MDM: CPT

## 2021-10-16 PROCEDURE — 2700000000 HC OXYGEN THERAPY PER DAY

## 2021-10-16 PROCEDURE — 83880 ASSAY OF NATRIURETIC PEPTIDE: CPT

## 2021-10-16 PROCEDURE — 94640 AIRWAY INHALATION TREATMENT: CPT

## 2021-10-16 PROCEDURE — 93005 ELECTROCARDIOGRAM TRACING: CPT | Performed by: INTERNAL MEDICINE

## 2021-10-16 PROCEDURE — 94761 N-INVAS EAR/PLS OXIMETRY MLT: CPT

## 2021-10-16 PROCEDURE — 85025 COMPLETE CBC W/AUTO DIFF WBC: CPT

## 2021-10-16 RX ORDER — IPRATROPIUM BROMIDE AND ALBUTEROL SULFATE 2.5; .5 MG/3ML; MG/3ML
SOLUTION RESPIRATORY (INHALATION)
Status: COMPLETED
Start: 2021-10-16 | End: 2021-10-16

## 2021-10-16 RX ORDER — 0.9 % SODIUM CHLORIDE 0.9 %
1000 INTRAVENOUS SOLUTION INTRAVENOUS ONCE
Status: DISCONTINUED | OUTPATIENT
Start: 2021-10-16 | End: 2021-10-19 | Stop reason: HOSPADM

## 2021-10-16 RX ORDER — METHYLPREDNISOLONE SODIUM SUCCINATE 125 MG/2ML
125 INJECTION, POWDER, LYOPHILIZED, FOR SOLUTION INTRAMUSCULAR; INTRAVENOUS ONCE
Status: COMPLETED | OUTPATIENT
Start: 2021-10-16 | End: 2021-10-16

## 2021-10-16 RX ADMIN — METHYLPREDNISOLONE SODIUM SUCCINATE 125 MG: 125 INJECTION, POWDER, FOR SOLUTION INTRAMUSCULAR; INTRAVENOUS at 21:00

## 2021-10-16 RX ADMIN — IPRATROPIUM BROMIDE AND ALBUTEROL SULFATE 3 ML: .5; 3 SOLUTION RESPIRATORY (INHALATION) at 15:55

## 2021-10-16 ASSESSMENT — ENCOUNTER SYMPTOMS
WHEEZING: 0
PHOTOPHOBIA: 0
SHORTNESS OF BREATH: 1
STRIDOR: 0
VOICE CHANGE: 0
TROUBLE SWALLOWING: 0
BACK PAIN: 0
NAUSEA: 0
FACIAL SWELLING: 0
BLOOD IN STOOL: 0
ABDOMINAL PAIN: 0
VOMITING: 0
COLOR CHANGE: 0

## 2021-10-16 NOTE — ED PROVIDER NOTES
[address]   eMERGENCY dEPARTMENT eNCOUnter      Pt Name: Drake Arriola  MRN: [id number]  [place] 1955  Date of evaluation: 10/16/2021  Provider: Renetta Triplett MD    [address] [place] [address]       Chief Complaint   Patient presents with    Shortness of Breath     patient complains of increased shortness of breath for 10-12 days. has been increasing his home O2 to 5 liters per minute. HISTORY OF PRESENT ILLNESS   (Location/Symptom, Timing/Onset, Context/Setting, Quality, Duration, Modifying Factors, Severity)  Note limiting factors. Drake Arriola is a 74 y.o. male with hx of pulmonary fibrosis and diagnosis of tuberculosis in June 2021 who is still currently on medications who reports worsening shortness of breath and increased oxygen requirements for the past 12 days. Patient reports that he was down to approximately 3 L however has had to increase it to 5 L. He reports he is not short of breath he is perfectly still but even moderate movement such as sitting up to put on his prosthetic leg makes him extremely short of breath. Patient reports his symptoms are moderate to severe, constant, and worsening. He denies any chest pain hemoptysis fevers or infectious symptoms. HPI    Nursing Notes were reviewed. REVIEW OFSYSTEMS    (2-9 systems for level 4, 10 or more for level 5)     Review of Systems   Constitutional: Positive for fatigue. Negative for appetite change, fever and unexpected weight change. HENT: Negative for drooling, facial swelling, trouble swallowing and voice change. Eyes: Negative for photophobia and visual disturbance. Respiratory: Positive for shortness of breath. Negative for wheezing and stridor. Cardiovascular: Negative for chest pain and palpitations. Gastrointestinal: Negative for abdominal pain, blood in stool, nausea and vomiting. Genitourinary: Negative for difficulty urinating and dysuria.    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    Tobacco Use    Smoking status: Never Smoker    Smokeless tobacco: Never Used   Substance and Sexual Activity    Alcohol use: Yes     Alcohol/week: 1.0 standard drinks     Types: 1 Cans of beer per week    Drug use: No    Sexual activity: Yes     Partners: Female   Other Topics Concern    None   Social History Narrative    None     Social Determinants of Health     Financial Resource Strain: Low Risk     Difficulty of Paying Living Expenses: Not hard at all   Food Insecurity: No Food Insecurity    Worried About Running Out of Food in the Last Year: Never true    [address] in the Last Year: Never true   Transportation Needs:     Lack of Transportation (Medical):  Lack of Transportation (Non-Medical):    Physical Activity:     Days of Exercise per Week:     Minutes of Exercise per Session:    Stress:     Feeling of Stress :    Social Connections:     Frequency of Communication with Friends and Family:     Frequency of Social Gatherings with Friends and Family:     Attends Rastafari Services:     Active Member of Clubs or Organizations:     Attends Club or Organization Meetings:     Marital Status:    Intimate Partner Violence:     Fear of Current or Ex-Partner:     Emotionally Abused:     Physically Abused:     Sexually Abused:          PHYSICAL EXAM    (up to 7 for level 4, 8 or more for level 5)     ED Triage Vitals [10/16/21 1445]   BP Temp Temp Source Pulse Resp SpO2 Height Weight   110/71 97.7 °F (36.5 °C) Oral 96 18 100 % 5' 7\" (1.702 m) 128 lb (58.1 kg)       Physical Exam  Vitals and nursing note reviewed. Constitutional:       Comments: Chronically ill-appearing male in no acute distress   HENT:      Head: Normocephalic and atraumatic. Right Ear: External ear normal.      Left Ear: External ear normal.   Eyes:      Conjunctiva/sclera: Conjunctivae normal.   Neck:      Vascular: No JVD. Trachea: No tracheal deviation. Cardiovascular:      Rate and Rhythm: Normal rate. Pulmonary:      Effort: Pulmonary effort is normal. No respiratory distress. Breath sounds: Wheezing present. Comments: Moderate and expiratory wheezing bilaterally  Abdominal:      General: There is no distension. Palpations: Abdomen is soft. Tenderness: There is no abdominal tenderness. There is no guarding or rebound. Musculoskeletal:         General: No tenderness. Normal range of motion. Cervical back: Neck supple. Comments: Right below the leg amputation   Skin:     General: Skin is warm and dry. Neurological:      Mental Status: He is alert. Cranial Nerves: No cranial nerve deficit. DIAGNOSTIC RESULTS       RADIOLOGY:     Interpretation per the Radiologist below, if available at the time of this note:    XR CHEST PORTABLE   Final Result   1. Extensive findings of interstitial lung disease with improved airspace opacification in comparison to 6/23/2021. ED BEDSIDE ULTRASOUND:   Performed by ED Physician - none    LABS:  Labs Reviewed   CBC WITH AUTO DIFFERENTIAL - Abnormal; Notable for the following components:       Result Value    RBC 3.19 (*)     Hemoglobin 10.6 (*)     Hematocrit 31.4 (*)     RDW 18.6 (*)     All other components within normal limits    Narrative:     Performed at:  [facility]) - [facility]  [address],  [place] [address]   Phone [phone]   COMPREHENSIVE METABOLIC PANEL W/ REFLEX TO MG FOR LOW K   TROPONIN   BRAIN NATRIURETIC PEPTIDE       All otherlabs were within normal range or not returned as of this dictation.     EMERGENCY DEPARTMENT COURSE and DIFFERENTIAL DIAGNOSIS/MDM:   Vitals:    Vitals:    10/16/21 1445 10/16/21 1545   BP: 110/71    Pulse: 96    Resp: 18 16   Temp: 97.7 °F (36.5 °C)    TempSrc: Oral    SpO2: 100% 99%   Weight: 128 lb (58.1 kg)    Height: 5' 7\" (1.702 m)          MDM  Vital signs within normal limits and the patient is not hypoxic on 2 L when at rest however even with very mild exertion such as sitting up in his bed to put on his prosthetic leg the patient becomes hypoxic to 70% becomes very symptomatic. He is unable to ambulate or even perform activities of daily living because of this even when he increases his oxygen to 5 L nasal cannula. Based on this severe shortness of breath on exertion with hypoxia I do feel the patient warrants IV steroids, breathing treatments, and admission for further medical management. The patient, his wife, and his son all expressed understanding and agreement with this plan. CONSULTS:  IP CONSULT TO HOSPITALIST    PROCEDURES:  Unless otherwise noted below, none     Critical Care  Performed by: Rafi Celaya MD  Authorized by: Rafi Celaya MD     Critical care provider statement:     Critical care time (minutes):  30    Critical care time was exclusive of:  Separately billable procedures and treating other patients and teaching time    Critical care was necessary to treat or prevent imminent or life-threatening deterioration of the following conditions:  Respiratory failure and shock    Critical care was time spent personally by me on the following activities:  Ordering and performing treatments and interventions, development of treatment plan with patient or surrogate, ordering and review of laboratory studies, discussions with consultants, ordering and review of radiographic studies, pulse oximetry, evaluation of patient's response to treatment, re-evaluation of patient's condition and obtaining history from patient or surrogate        FINAL IMPRESSION      1. Acute on chronic respiratory failure with hypoxia (HCC)    2.  SOB (shortness of breath) on exertion          DISPOSITION/PLAN   DISPOSITION Admitted 10/16/2021 06:33:51 PM        (Please note that portions of this note were completed with a voice recognition program.  Efforts were

## 2021-10-17 LAB
ALBUMIN SERPL-MCNC: 2.3 G/DL (ref 3.4–5)
ALP BLD-CCNC: 134 U/L (ref 40–129)
ALT SERPL-CCNC: 23 U/L (ref 10–40)
ANION GAP SERPL CALCULATED.3IONS-SCNC: 8 MMOL/L (ref 3–16)
AST SERPL-CCNC: 50 U/L (ref 15–37)
BASOPHILS ABSOLUTE: 0 K/UL (ref 0–0.2)
BASOPHILS RELATIVE PERCENT: 0.3 %
BILIRUB SERPL-MCNC: 1.2 MG/DL (ref 0–1)
BILIRUBIN DIRECT: 0.6 MG/DL (ref 0–0.3)
BILIRUBIN, INDIRECT: 0.6 MG/DL (ref 0–1)
BUN BLDV-MCNC: 11 MG/DL (ref 7–20)
C-REACTIVE PROTEIN: 21.9 MG/L (ref 0–5.1)
CALCIUM SERPL-MCNC: 8 MG/DL (ref 8.3–10.6)
CHLORIDE BLD-SCNC: 99 MMOL/L (ref 99–110)
CO2: 26 MMOL/L (ref 21–32)
CREAT SERPL-MCNC: 0.6 MG/DL (ref 0.8–1.3)
EOSINOPHILS ABSOLUTE: 0 K/UL (ref 0–0.6)
EOSINOPHILS RELATIVE PERCENT: 0.4 %
GFR AFRICAN AMERICAN: >60
GFR NON-AFRICAN AMERICAN: >60
GLUCOSE BLD-MCNC: 83 MG/DL (ref 70–99)
HCT VFR BLD CALC: 29.5 % (ref 40.5–52.5)
HEMOGLOBIN: 9.9 G/DL (ref 13.5–17.5)
LYMPHOCYTES ABSOLUTE: 1.1 K/UL (ref 1–5.1)
LYMPHOCYTES RELATIVE PERCENT: 16.9 %
MAGNESIUM: 2 MG/DL (ref 1.8–2.4)
MCH RBC QN AUTO: 33.6 PG (ref 26–34)
MCHC RBC AUTO-ENTMCNC: 33.5 G/DL (ref 31–36)
MCV RBC AUTO: 100.3 FL (ref 80–100)
MONOCYTES ABSOLUTE: 0.9 K/UL (ref 0–1.3)
MONOCYTES RELATIVE PERCENT: 13.2 %
NEUTROPHILS ABSOLUTE: 4.7 K/UL (ref 1.7–7.7)
NEUTROPHILS RELATIVE PERCENT: 69.2 %
PDW BLD-RTO: 18.3 % (ref 12.4–15.4)
PHOSPHORUS: 3.2 MG/DL (ref 2.5–4.9)
PLATELET # BLD: 252 K/UL (ref 135–450)
PMV BLD AUTO: 8.2 FL (ref 5–10.5)
POTASSIUM SERPL-SCNC: 4.9 MMOL/L (ref 3.5–5.1)
PROCALCITONIN: 0.18 NG/ML (ref 0–0.15)
RBC # BLD: 2.94 M/UL (ref 4.2–5.9)
SARS-COV-2: NOT DETECTED
SEDIMENTATION RATE, ERYTHROCYTE: 77 MM/HR (ref 0–20)
SODIUM BLD-SCNC: 133 MMOL/L (ref 136–145)
TOTAL PROTEIN: 7.1 G/DL (ref 6.4–8.2)
WBC # BLD: 6.8 K/UL (ref 4–11)

## 2021-10-17 PROCEDURE — 99223 1ST HOSP IP/OBS HIGH 75: CPT | Performed by: INTERNAL MEDICINE

## 2021-10-17 PROCEDURE — 2700000000 HC OXYGEN THERAPY PER DAY

## 2021-10-17 PROCEDURE — 2580000003 HC RX 258: Performed by: SURGERY

## 2021-10-17 PROCEDURE — 84145 PROCALCITONIN (PCT): CPT

## 2021-10-17 PROCEDURE — 2060000000 HC ICU INTERMEDIATE R&B

## 2021-10-17 PROCEDURE — 6360000002 HC RX W HCPCS: Performed by: STUDENT IN AN ORGANIZED HEALTH CARE EDUCATION/TRAINING PROGRAM

## 2021-10-17 PROCEDURE — 80076 HEPATIC FUNCTION PANEL: CPT

## 2021-10-17 PROCEDURE — 6360000002 HC RX W HCPCS: Performed by: SURGERY

## 2021-10-17 PROCEDURE — 86140 C-REACTIVE PROTEIN: CPT

## 2021-10-17 PROCEDURE — 83735 ASSAY OF MAGNESIUM: CPT

## 2021-10-17 PROCEDURE — 84443 ASSAY THYROID STIM HORMONE: CPT

## 2021-10-17 PROCEDURE — 80069 RENAL FUNCTION PANEL: CPT

## 2021-10-17 PROCEDURE — 94761 N-INVAS EAR/PLS OXIMETRY MLT: CPT

## 2021-10-17 PROCEDURE — U0003 INFECTIOUS AGENT DETECTION BY NUCLEIC ACID (DNA OR RNA); SEVERE ACUTE RESPIRATORY SYNDROME CORONAVIRUS 2 (SARS-COV-2) (CORONAVIRUS DISEASE [COVID-19]), AMPLIFIED PROBE TECHNIQUE, MAKING USE OF HIGH THROUGHPUT TECHNOLOGIES AS DESCRIBED BY CMS-2020-01-R: HCPCS

## 2021-10-17 PROCEDURE — 6370000000 HC RX 637 (ALT 250 FOR IP): Performed by: SURGERY

## 2021-10-17 PROCEDURE — 36415 COLL VENOUS BLD VENIPUNCTURE: CPT

## 2021-10-17 PROCEDURE — 85652 RBC SED RATE AUTOMATED: CPT

## 2021-10-17 PROCEDURE — 85025 COMPLETE CBC W/AUTO DIFF WBC: CPT

## 2021-10-17 PROCEDURE — U0005 INFEC AGEN DETEC AMPLI PROBE: HCPCS

## 2021-10-17 RX ORDER — ACETAMINOPHEN 650 MG/1
650 SUPPOSITORY RECTAL EVERY 6 HOURS PRN
Status: DISCONTINUED | OUTPATIENT
Start: 2021-10-17 | End: 2021-10-19 | Stop reason: HOSPADM

## 2021-10-17 RX ORDER — MECOBALAMIN 5000 MCG
5 TABLET,DISINTEGRATING ORAL NIGHTLY
Status: DISCONTINUED | OUTPATIENT
Start: 2021-10-17 | End: 2021-10-19 | Stop reason: HOSPADM

## 2021-10-17 RX ORDER — ONDANSETRON 2 MG/ML
4 INJECTION INTRAMUSCULAR; INTRAVENOUS EVERY 6 HOURS PRN
Status: DISCONTINUED | OUTPATIENT
Start: 2021-10-17 | End: 2021-10-19 | Stop reason: HOSPADM

## 2021-10-17 RX ORDER — FAMOTIDINE 20 MG/1
20 TABLET, FILM COATED ORAL 2 TIMES DAILY
Status: DISCONTINUED | OUTPATIENT
Start: 2021-10-17 | End: 2021-10-19 | Stop reason: HOSPADM

## 2021-10-17 RX ORDER — ACETAMINOPHEN 325 MG/1
650 TABLET ORAL EVERY 6 HOURS PRN
Status: DISCONTINUED | OUTPATIENT
Start: 2021-10-17 | End: 2021-10-19 | Stop reason: HOSPADM

## 2021-10-17 RX ORDER — SODIUM CHLORIDE 9 MG/ML
25 INJECTION, SOLUTION INTRAVENOUS PRN
Status: DISCONTINUED | OUTPATIENT
Start: 2021-10-17 | End: 2021-10-19 | Stop reason: HOSPADM

## 2021-10-17 RX ORDER — ISONIAZID 300 MG/1
300 TABLET ORAL DAILY
Status: DISCONTINUED | OUTPATIENT
Start: 2021-10-17 | End: 2021-10-19 | Stop reason: HOSPADM

## 2021-10-17 RX ORDER — SODIUM CHLORIDE 0.9 % (FLUSH) 0.9 %
5-40 SYRINGE (ML) INJECTION EVERY 12 HOURS SCHEDULED
Status: DISCONTINUED | OUTPATIENT
Start: 2021-10-17 | End: 2021-10-19 | Stop reason: HOSPADM

## 2021-10-17 RX ORDER — SODIUM CHLORIDE 9 MG/ML
INJECTION, SOLUTION INTRAVENOUS CONTINUOUS
Status: ACTIVE | OUTPATIENT
Start: 2021-10-17 | End: 2021-10-17

## 2021-10-17 RX ORDER — FUROSEMIDE 10 MG/ML
20 INJECTION INTRAMUSCULAR; INTRAVENOUS ONCE
Status: COMPLETED | OUTPATIENT
Start: 2021-10-17 | End: 2021-10-17

## 2021-10-17 RX ORDER — SODIUM CHLORIDE 0.9 % (FLUSH) 0.9 %
5-40 SYRINGE (ML) INJECTION PRN
Status: DISCONTINUED | OUTPATIENT
Start: 2021-10-17 | End: 2021-10-19 | Stop reason: HOSPADM

## 2021-10-17 RX ORDER — OMEGA-3S/DHA/EPA/FISH OIL/D3 300MG-1000
400 CAPSULE ORAL DAILY
Status: DISCONTINUED | OUTPATIENT
Start: 2021-10-17 | End: 2021-10-19 | Stop reason: HOSPADM

## 2021-10-17 RX ORDER — POLYETHYLENE GLYCOL 3350 17 G/17G
17 POWDER, FOR SOLUTION ORAL DAILY PRN
Status: DISCONTINUED | OUTPATIENT
Start: 2021-10-17 | End: 2021-10-19 | Stop reason: HOSPADM

## 2021-10-17 RX ORDER — TRAZODONE HYDROCHLORIDE 50 MG/1
50 TABLET ORAL NIGHTLY PRN
Status: DISCONTINUED | OUTPATIENT
Start: 2021-10-17 | End: 2021-10-19 | Stop reason: HOSPADM

## 2021-10-17 RX ORDER — LANOLIN ALCOHOL/MO/W.PET/CERES
50 CREAM (GRAM) TOPICAL DAILY
Status: DISCONTINUED | OUTPATIENT
Start: 2021-10-17 | End: 2021-10-19 | Stop reason: HOSPADM

## 2021-10-17 RX ORDER — ONDANSETRON 4 MG/1
4 TABLET, ORALLY DISINTEGRATING ORAL EVERY 8 HOURS PRN
Status: DISCONTINUED | OUTPATIENT
Start: 2021-10-17 | End: 2021-10-19 | Stop reason: HOSPADM

## 2021-10-17 RX ADMIN — PYRIDOXINE HCL TAB 50 MG 50 MG: 50 TAB at 08:16

## 2021-10-17 RX ADMIN — SODIUM CHLORIDE: 9 INJECTION, SOLUTION INTRAVENOUS at 02:11

## 2021-10-17 RX ADMIN — ISONIAZID 300 MG: 300 TABLET ORAL at 08:16

## 2021-10-17 RX ADMIN — Medication 5 MG: at 02:11

## 2021-10-17 RX ADMIN — RIFAMPIN 600 MG: 300 CAPSULE ORAL at 08:16

## 2021-10-17 RX ADMIN — SODIUM CHLORIDE, PRESERVATIVE FREE 10 ML: 5 INJECTION INTRAVENOUS at 08:20

## 2021-10-17 RX ADMIN — ENOXAPARIN SODIUM 40 MG: 40 INJECTION SUBCUTANEOUS at 08:16

## 2021-10-17 RX ADMIN — FUROSEMIDE 20 MG: 10 INJECTION, SOLUTION INTRAVENOUS at 18:22

## 2021-10-17 RX ADMIN — SODIUM CHLORIDE, PRESERVATIVE FREE 5 ML: 5 INJECTION INTRAVENOUS at 21:36

## 2021-10-17 RX ADMIN — CHOLECALCIFEROL (VITAMIN D3) 10 MCG (400 UNIT) TABLET 400 UNITS: at 08:16

## 2021-10-17 RX ADMIN — FAMOTIDINE 20 MG: 20 TABLET, FILM COATED ORAL at 02:12

## 2021-10-17 RX ADMIN — FAMOTIDINE 20 MG: 20 TABLET, FILM COATED ORAL at 21:36

## 2021-10-17 RX ADMIN — FAMOTIDINE 20 MG: 20 TABLET, FILM COATED ORAL at 08:16

## 2021-10-17 ASSESSMENT — PAIN SCALES - GENERAL
PAINLEVEL_OUTOF10: 0

## 2021-10-17 ASSESSMENT — ENCOUNTER SYMPTOMS
WHEEZING: 0
VOMITING: 0
SHORTNESS OF BREATH: 1
NAUSEA: 0
GASTROINTESTINAL NEGATIVE: 1
COUGH: 1

## 2021-10-17 NOTE — PROGRESS NOTES
This nurse updated Delon Courser (wife) on pt's plan of care. Discussed IV fluids, 3L NC, nonprod cough, and that pulmonology should see him today. All questions answered.

## 2021-10-17 NOTE — CONSULTS
Pulmonology Consult Notes        Hospital Day:                                                           Code:Full Code  Admit Date: 10/16/2021  PCP: Des Peralta DO                                  CC:     HISTORY OF PRESENT ILLNESS:   The patient is a 75 Y/O M with PMH of TB and pulmonary fibrosis prented to the ED with complaints of increasing SOB and cough. He reports he had low saturations into the 60's when ambulating or going to the bathroom. Per the patien he was able to walk around on 4L NC at home nut now is not able to walk 5 feet before becoming SOB. He had been on RIPE therapy initially for active TB infection and developed liver injury. He was then triedd on triple therapy and now remains on izoniazid and rifampin. Had a previous presenaion to the  ED in late september for similar symptoms. CTPA done did not show PE and showed stable ILD. He does not endorse any nausea or vomintg, fever, or chills. He denies any sick contacts. H has been vaccnaited for CoVID. Since admisssion he has remianed a febrile, requiring 3L supplemtnal oxygen. He has no elevation of his white count.  CXR shows improvement in airspace disease compared to previous study    PAST HISTORY:     Past Medical History:   Diagnosis Date    Amputation of leg below knee, right, traumatic, complicated, subsequent encounter     MTB (Mycobacterium tuberculosis infection) 06/23/2021    Pulmonary fibrosis ([address]        Past Surgical History:   Procedure Laterality Date    BRONCHOSCOPY N/A 6/23/2021    BRONCHOSCOPY BRONCHOALVEOLAR LAVAGE performed by Sugey Whatley MD at [address]  6/23/2021    BRONCHOSCOPY BRUSHINGS performed by Sugey Whatley MD at [address]  5/21/2021    CT GUIDED CHEST TUBE 5/21/2021 [facility] CT SCAN    CT NEEDLE BIOPSY LUNG PERCUTANEOUS  5/21/2021    CT NEEDLE BIOPSY LUNG PERCUTANEOUS 5/21/2021 [facility] CT SCAN       SocialHistory:   The patient lives at home     Alcohol: None   Illicit drugs: no use  Tobacco:  None     Family History:  History reviewed. No pertinent family history.     MEDICATIONS:     Current Facility-Administered Medications on File Prior to Encounter   Medication Dose Route Frequency Provider Last Rate Last Admin    oxyCODONE-acetaminophen (PERCOCET) 5-325 MG per tablet 1 tablet  1 tablet Oral Q8H PRN Silviano Mcintyre MD   1 tablet at 05/21/21 1409    benzonatate (TESSALON) capsule 100 mg  100 mg Oral TID PRN Rafia Rosales MD   100 mg at 05/21/21 1618     Current Outpatient Medications on File Prior to Encounter   Medication Sig Dispense Refill    rifAMPin (RIFADIN) 300 MG capsule Take 600 mg by mouth daily 2 capsules daily      isoniazid (NYDRAZID) 300 MG tablet Take 300 mg by mouth daily      vitamin B-6 (PYRIDOXINE) 50 MG tablet Take 50 mg by mouth daily      levoFLOXacin (LEVAQUIN) 750 MG tablet Take 750 mg by mouth daily      sennosides-docusate sodium (SENOKOT-S) 8.6-50 MG tablet Take 1 tablet by mouth 2 times daily 60 tablet 2    ethambutol (MYAMBUTOL) 400 MG tablet Take 3 tablets by mouth daily 30 tablet 4    Cholecalciferol (VITAMIN D3 PO) Take by mouth           Scheduled Meds:   sodium chloride flush  5-40 mL IntraVENous 2 times per day    enoxaparin  40 mg SubCUTAneous Daily    famotidine  20 mg Oral BID    vitamin D3  400 Units Oral Daily    isoniazid  300 mg Oral Daily    rifAMPin  600 mg Oral Daily    vitamin B-6  50 mg Oral Daily    melatonin  5 mg Oral Nightly    sodium chloride  1,000 mL IntraVENous Once      Continuous Infusions:   sodium chloride      sodium chloride 100 mL/hr at 10/17/21 0211     PRN Meds:sodium chloride flush, sodium chloride, ondansetron **OR** ondansetron, polyethylene glycol, acetaminophen **OR** acetaminophen, perflutren lipid microspheres, traZODone    Allergies: No Known Allergies    REVIEW OF SYSTEMS:       History obtained from chart review and the patient    Review of Systems   Constitutional: Negative for chills, fatigue and fever. HENT: Negative. Respiratory: Positive for cough and shortness of breath. Negative for wheezing. Cardiovascular: Negative. Negative for chest pain, palpitations and leg swelling. Gastrointestinal: Negative. Negative for nausea and vomiting. Genitourinary: Negative. Musculoskeletal: Negative. Skin: Negative. Neurological: Negative. Negative for dizziness. Hematological: Negative. Psychiatric/Behavioral: Negative. PHYSICAL EXAM:       Vitals: /64   Pulse 79   Temp 97.8 °F (36.6 °C) (Oral)   Resp 18   Ht 5' 7\" (1.702 m)   Wt 128 lb (58.1 kg)   SpO2 98%   BMI 20.05 kg/m²     I/O:      Intake/Output Summary (Last 24 hours) at 10/17/2021 0918  Last data filed at 10/17/2021 0402  Gross per 24 hour   Intake 200 ml   Output 400 ml   Net -200 ml     No intake/output data recorded. I/O last 3 completed shifts: In: 200 [P.O.:200]  Out: 400 [Urine:400]    Physical Examination:     Physical Exam  Constitutional:       General: He is not in acute distress. Appearance: He is not toxic-appearing. HENT:      Head: Normocephalic and atraumatic. Eyes:      General: No scleral icterus. Right eye: No discharge. Extraocular Movements: Extraocular movements intact. Conjunctiva/sclera: Conjunctivae normal.      Pupils: Pupils are equal, round, and reactive to light. Cardiovascular:      Rate and Rhythm: Normal rate and regular rhythm. Pulses: Normal pulses. Heart sounds: Normal heart sounds. No murmur heard. Pulmonary:      Effort: Pulmonary effort is normal.      Breath sounds: No wheezing. Comments: Bilateral diffuse crackles   Abdominal:      General: Abdomen is flat. Bowel sounds are normal.      Palpations: Abdomen is soft. Musculoskeletal:         General: Normal range of motion. Cervical back: Normal range of motion and neck supple. No rigidity.    Skin:     General: Skin is warm and dry. Neurological:      General: No focal deficit present. Mental Status: He is alert and oriented to person, place, and time. Mental status is at baseline. Cranial Nerves: No cranial nerve deficit. Sensory: No sensory deficit. Motor: No weakness. Gait: Gait normal.      Deep Tendon Reflexes: Reflexes normal.   Psychiatric:         Mood and Affect: Mood normal.         Behavior: Behavior normal.         Thought Content: Thought content normal.         Judgment: Judgment normal.         No results for input(s): PHART, VGQ0HJQ, PO2ART in the last 72 hours. DATA:       Labs:  CBC:   Recent Labs     10/16/21  1855   WBC 7.4   HGB 10.6*   HCT 31.4*          BMP:   Recent Labs     10/16/21  1855   *   K 4.1   CL 98*   CO2 28   BUN 8   CREATININE 0.6*   GLUCOSE 98     LFT's:   Recent Labs     10/16/21  1855   AST 55*   ALT 18   BILITOT 0.6   ALKPHOS 170*     Troponin:   Recent Labs     10/16/21  1855   TROPONINI <0.01     BNP:No results for input(s): BNP in the last 72 hours. ABGs: No results for input(s): PHART, OXS4ZTW, PO2ART in the last 72 hours. INR: No results for input(s): INR in the last 72 hours. U/A:No results for input(s): NITRITE, COLORU, PHUR, LABCAST, WBCUA, RBCUA, MUCUS, TRICHOMONAS, YEAST, BACTERIA, CLARITYU, SPECGRAV, LEUKOCYTESUR, UROBILINOGEN, BILIRUBINUR, BLOODU, GLUCOSEU, AMORPHOUS in the last 72 hours. Invalid input(s): KETONESU    XR CHEST PORTABLE   Final Result   1. Extensive findings of interstitial lung disease with improved airspace opacification in comparison to 6/23/2021. EKG:   Echo:  Micro:     ASSESSMENT AND PLAN:   Nadja Hi is a 74 y.o. male, who was admitted with acute/chronic hypoxic resp failure    Acute on chronic hypoxic resp faiulre 2/2 interstitial lung disease and TB.  Other etiology is he may have some concurrent heart failure, as TB is know to at time affect the myocardium, percardium and aorta. CXR done on admission appears improved since previous.-Was requiring increased oxygen at home with saturations into the 60's on mabualtion. Presentation apppears similar to previous ED visit on 9/23. CTPA was neagtive for PE and showed stable ILD. His BNP is slightly elevated above baseline   He may have been recently treated with levaquin but patient stated he was not   He is currently requiring 3L NC . Procal, CRP ESR, CoVID, INfluenza pending  He received a dose of steroids on admission.  -Would not continue steroids.  -Continue Isonaizid and rifampin  -Would order echo if not already done   -Can try a dose of lasix to see if that decreases his symptoms       TB   -Continue Isoniazid and rifampin   -ontinue isolation         This patient has been staffed and discussed with Dr Azucena Reyna   -----------------------------  Nia Pratt MD, PGY3  10/17/2021  9:18 AM    Patient seen, examined and discussed with the resident and I agree with the assessment and plan. Briefly, this is a 74 y.o. male  with interstitial lung disease/IPF, tuberculosis with acute on chronic hypoxemic respiratory failure. Patient reports 4 days of worsened dyspnea on top of chronic dyspnea and increased oxygen requirement. He has had some productive cough of clear to white sputum. Prior to the past 4 days he would not get dyspneic just with sitting up or trying to get himself to the restroom and his oxygen saturations were stable on 3 L of oxygen. Several weeks ago he was able to tolerate 2 L of oxygen. He was ruled out for Covid. He remains on his therapy for tuberculosis although he did have elevated transaminases with the original RI PE therapy. Patient can come out of droplet plus isolation. He ought to be able to come out of TB isolation as has been consistent on therapy for several months and should no longer be infectious.     We are giving him a dose of Lasix today to see if some diuresis improves his respiratory status. I am not sure that it will as he seems more short of breath when he sits up versus lays down and he does not have any peripheral edema. We are also getting an echo to see if there is evidence of worsened systolic function or pulmonary hypertension given his fibrotic lung disease. His normal procalcitonin makes a new bacterial infection much less likely. If this is not heart failure, and not a new infection, the most likely assessment is that this is worsening of his interstitial lung disease. In reviewing his previous CT scans there was clearly worsening between May and July. The scans between July and September were read as stable but I feel there may have been worsening there as well. If his echocardiogram is unremarkable I will repeat a high-resolution CT. If it does show worsening of his interstitial lung disease then his prognosis is poor and we should discuss goals of care. Prior to this interstitial lung disease he had good functional status despite his right lower extremity amputation, however he is a few years older than is typically recommended for lung transplant. The fact that he has active tuberculosis is an absolute contraindication to transplantation, which is unfortunate as he would otherwise be a good candidate despite his age in my opinion.         Venus Oh MD

## 2021-10-17 NOTE — PLAN OF CARE
Problem: OXYGENATION/RESPIRATORY FUNCTION  Goal: Patient will maintain patent airway  10/17/2021 1117 by Ramy Evans RN  Outcome: Ongoing  10/17/2021 0153 by Coy Parada RN  Outcome: Ongoing  Note: Airway remains patent. Problem: OXYGENATION/RESPIRATORY FUNCTION  Goal: Patient will achieve/maintain normal respiratory rate/effort  Description: Respiratory rate and effort will be within normal limits for the patient  10/17/2021 1117 by Ramy Evans RN  Outcome: Ongoing  10/17/2021 0153 by Coy Parada RN  Outcome: Ongoing  Note: RR is WNL.  Pt on 3L NC oxygen is %

## 2021-10-17 NOTE — H&P
Internal Medicine  PGY 3  History & Physical      CC - desaturating to 60s when going to bathroom, SOB, cough    History Obtained From:  patient, electronic medical record    HISTORY OF PRESENT ILLNESS:  74 yom hx of active pulmonary TB and IPF (follows with Dr. Richelle Esqueda). Over the last few days he has been experiencing SOB and mild cough for which he presented to [facility] ED. He was found to desaturate into 60-70s upon going to the bathroom. He denies associated fever/chills. He endorses insomnia over the last several weeks. He denies n/v/d. He has been vaccinated for [place] [employer]). He was initiated on RIPE therapy on 7/1/21 and subsequently developed DILI. U[on normalization of LFTs, RIPE was reintroduced. He has completed 3-4 months of triple agent therapy and is now on just INH and rifampin.     Past Medical History:        Diagnosis Date    Amputation of leg below knee, right, traumatic, complicated, subsequent encounter     MTB (Mycobacterium tuberculosis infection) 06/23/2021    Pulmonary fibrosis ([facility] [address]    ·     Past Surgical History:        Procedure Laterality Date    BRONCHOSCOPY N/A 6/23/2021    BRONCHOSCOPY BRONCHOALVEOLAR LAVAGE performed by Rachid Davis MD at [id number] [place] [address]  6/23/2021    BRONCHOSCOPY BRUSHINGS performed by Rachid Davis MD at [address] [facility]  5/21/2021    CT GUIDED CHEST TUBE 5/21/2021 [facility] CT SCAN    CT NEEDLE BIOPSY LUNG PERCUTANEOUS  5/21/2021    CT NEEDLE BIOPSY LUNG PERCUTANEOUS 5/21/2021 [facility] CT SCAN   ·     Medications Priorto Admission:    · Medications Prior to Admission: rifAMPin (RIFADIN) 300 MG capsule, Take 600 mg by mouth daily 2 capsules daily  · isoniazid (NYDRAZID) 300 MG tablet, Take 300 mg by mouth daily  · vitamin B-6 (PYRIDOXINE) 50 MG tablet, Take 50 mg by mouth daily  · levoFLOXacin (LEVAQUIN) 750 MG tablet, Take 750 mg by mouth daily  · sennosides-docusate sodium (SENOKOT-S) 8.6-50 MG tablet, Take 1 tablet by mouth 2 times daily  · ethambutol (MYAMBUTOL) 400 MG tablet, Take 3 tablets by mouth daily  · Cholecalciferol (VITAMIN D3 PO), Take by mouth    Allergies:  Patient has no known allergies. Social History:   · TOBACCO:   reports that he has never smoked. He has never used smokeless tobacco.  · ETOH:   reports current alcohol use of about 1.0 standard drinks of alcohol per week. · DRUGS : none  · Patient currently lives with family at home  ·   Family History:   · History reviewed. No pertinent family history. Review of Systems    ROS: A 10 point review of systems was conducted, significant findings as noted in HPI. Physical Exam  Constitutional:       General: He is not in acute distress. HENT:      Head: Normocephalic and atraumatic. Nose: Nose normal.      Mouth/Throat:      Mouth: Mucous membranes are moist.      Pharynx: Oropharynx is clear. Eyes:      Extraocular Movements: Extraocular movements intact. Conjunctiva/sclera: Conjunctivae normal.      Pupils: Pupils are equal, round, and reactive to light. Cardiovascular:      Rate and Rhythm: Normal rate and regular rhythm. Pulses: Normal pulses. Heart sounds: Normal heart sounds. Pulmonary:      Effort: Pulmonary effort is normal.      Comments: Dry crackles  Abdominal:      General: Abdomen is flat. Bowel sounds are normal.      Palpations: Abdomen is soft. Musculoskeletal:         General: Normal range of motion. Cervical back: Normal range of motion and neck supple. Left lower leg: No edema. Comments: Right BKA   Skin:     General: Skin is warm and dry. Capillary Refill: Capillary refill takes less than 2 seconds. Neurological:      General: No focal deficit present. Mental Status: He is alert and oriented to person, place, and time. Mental status is at baseline.    Psychiatric:         Mood and Affect: Mood normal.         Behavior: Behavior normal.         Thought Content: Thought content normal.         Judgment: Judgment normal.            Vitals:    10/17/21 0004   BP: 129/80   Pulse: 91   Resp: 20   Temp: 98 °F (36.7 °C)   SpO2: 97%       DATA:    Labs:  CBC:   Recent Labs     10/16/21  1855   WBC 7.4   HGB 10.6*   HCT 31.4*          BMP:   Recent Labs     10/16/21  1855   *   K 4.1   CL 98*   CO2 28   BUN 8   CREATININE 0.6*   GLUCOSE 98     LFT's:   Recent Labs     10/16/21  1855   AST 55*   ALT 18   BILITOT 0.6   ALKPHOS 170*     Troponin:   Recent Labs     10/16/21  1855   TROPONINI <0.01     BNP:No results for input(s): BNP in the last 72 hours. ABGs: No results for input(s): PHART, GHQ5HXM, PO2ART in the last 72 hours. INR: No results for input(s): INR in the last 72 hours. U/A:No results for input(s): NITRITE, COLORU, PHUR, LABCAST, WBCUA, RBCUA, MUCUS, TRICHOMONAS, YEAST, BACTERIA, CLARITYU, SPECGRAV, LEUKOCYTESUR, UROBILINOGEN, BILIRUBINUR, BLOODU, GLUCOSEU, AMORPHOUS in the last 72 hours. Invalid input(s): KETONESU    XR CHEST PORTABLE   Final Result   1. Extensive findings of interstitial lung disease with improved airspace opacification in comparison to 6/23/2021. ASSESSMENT AND PLAN:    Acute on chronic hypoxic respiratory failure in setting of idiopathic pulmonary fibrosis and active pulmonary TB on partial RIPE therapy  - baseline O2 requirement of 3-4 liters at home.    - currently maintaining SaO2 at home O2 requirement  - CXR with \"extensive findings of interstitial lung disease with improved airspace opacification in comparison to 6/23/2021\"  - f/u COVID test   - continue INH, rifampin, pyridoxine  - pulmonology consult    Hyponatremia  - mild, appears euvolemic  - NS @ 100cc/hr for 10 hours    Insomnia  - melatonin     Moderate protein calorie malnutrition  - Boost supplements TID with meals    Chronic:  Idiopathic pulm fibrosis  Right BKA (trauma c/b osteomyelitis) - well healed, no acute issues  Hx of DILI 2/2 RIPE  Hx of PTX with lung biopsy    Code Status: Full code  FEN: Regular diet  PPX: Lovenox 40 daily  DISPO: F    Will discuss with attending physician Dr. Franky Small MD  10/17/2021,  12:47 AM

## 2021-10-17 NOTE — ED NOTES
Patient assisted to [facility] for BM, requested to use restroom, explained to patient that he desats too rapidly. Had moderate amount soft brown stool. Desat to mid 70's, ,  patient placed back on stretcher. O2 returns to 90's, .  Sierra Godoy RN  10/16/21 8035

## 2021-10-17 NOTE — PROGRESS NOTES
Internal Medicine    Floor progress note    CC - desaturating to 60s when going to bathroom, SOB, cough    History Obtained From:  patient, electronic medical record    Interval Hx:  Beth Newton. No active complaints this am other than the new onset increased dyspnea that brought him to the hospital. Satting at 3 L on 98 percent . On exam he is not volume overlaoded. Morning labs CBC,RFP, procalc, CRP, Sed rate pending. HISTORY OF PRESENT ILLNESS:  74 yom hx of active pulmonary TB and IPF (follows with Dr. Jeimy Dugan). Over the last few days he has been experiencing SOB and mild cough for which he presented to [facility] ED. He was found to desaturate into 60-70s upon going to the bathroom. He denies associated fever/chills. He endorses insomnia over the last several weeks. He denies n/v/d. He has been vaccinated for [place] [employer]). He was initiated on RIPE therapy on 7/1/21 and subsequently developed DILI. Upon normalization of LFTs, RIPE was reintroduced. He has completed 3-4 months of triple agent therapy and is now on just INH and rifampin.     Past Medical History:        Diagnosis Date    Amputation of leg below knee, right, traumatic, complicated, subsequent encounter     MTB (Mycobacterium tuberculosis infection) 06/23/2021    Pulmonary fibrosis ([facility] [address]        Past Surgical History:        Procedure Laterality Date    BRONCHOSCOPY N/A 6/23/2021    BRONCHOSCOPY BRONCHOALVEOLAR LAVAGE performed by Nirali Gregory MD at [address]  6/23/2021    BRONCHOSCOPY BRUSHINGS performed by Nirali Gregory MD at [address]  5/21/2021    CT GUIDED CHEST TUBE 5/21/2021 [address] SCAN    CT NEEDLE BIOPSY LUNG PERCUTANEOUS  5/21/2021    CT NEEDLE BIOPSY LUNG PERCUTANEOUS 5/21/2021 [address] CT SCAN       Medications Priorto Admission:    Medications Prior to Admission: rifAMPin (RIFADIN) 300 MG capsule, Take 600 mg by mouth daily 2 capsules daily  isoniazid (NYDRAZID) 300 MG tablet, Take 300 mg by mouth daily  vitamin B-6 (PYRIDOXINE) 50 MG tablet, Take 50 mg by mouth daily  levoFLOXacin (LEVAQUIN) 750 MG tablet, Take 750 mg by mouth daily  sennosides-docusate sodium (SENOKOT-S) 8.6-50 MG tablet, Take 1 tablet by mouth 2 times daily  ethambutol (MYAMBUTOL) 400 MG tablet, Take 3 tablets by mouth daily  Cholecalciferol (VITAMIN D3 PO), Take by mouth    Allergies:  Patient has no known allergies. Social History:   · TOBACCO:   reports that he has never smoked. He has never used smokeless tobacco.  · ETOH:   reports current alcohol use of about 1.0 standard drinks of alcohol per week. · DRUGS : none  · Patient currently lives with family at home  ·   Family History:   History reviewed. No pertinent family history. Physical Exam  Constitutional:       General: He is not in acute distress. HENT:      Head: Normocephalic and atraumatic. Nose: Nose normal.      Mouth/Throat:      Mouth: Mucous membranes are moist.      Pharynx: Oropharynx is clear. Eyes:      Extraocular Movements: Extraocular movements intact. Conjunctiva/sclera: Conjunctivae normal.      Pupils: Pupils are equal, round, and reactive to light. Cardiovascular:      Rate and Rhythm: Normal rate and regular rhythm. Pulses: Normal pulses. Heart sounds: Normal heart sounds. Pulmonary:      Effort: Pulmonary effort is normal.      Comments: Dry crackles  Abdominal:      General: Abdomen is flat. Bowel sounds are normal.      Palpations: Abdomen is soft. Musculoskeletal:         General: Normal range of motion. Cervical back: Normal range of motion and neck supple. Left lower leg: No edema. Comments: Right BKA   Skin:     General: Skin is warm and dry. Capillary Refill: Capillary refill takes less than 2 seconds. Neurological:      General: No focal deficit present. Mental Status: He is alert and oriented to person, place, and time. Tyrone Lazo    Psychiatric: Mood and Affect: Mood normal.         Behavior: Behavior normal.         Thought Content: Thought content normal.         Judgment: Judgment normal.      Vitals:    10/17/21 0813   BP: 105/64   Pulse: 79   Resp: 18   Temp: 97.8 °F (36.6 °C)   SpO2: 98%       DATA:    Labs:  CBC:   Recent Labs     10/16/21  1855   WBC 7.4   HGB 10.6*   HCT 31.4*          BMP:   Recent Labs     10/16/21  1855   *   K 4.1   CL 98*   CO2 28   BUN 8   CREATININE 0.6*   GLUCOSE 98     LFT's:   Recent Labs     10/16/21  1855   AST 55*   ALT 18   BILITOT 0.6   ALKPHOS 170*     Troponin:   Recent Labs     10/16/21  1855   TROPONINI <0.01     BNP:No results for input(s): BNP in the last 72 hours. ABGs: No results for input(s): PHART, XWU7PHL, PO2ART in the last 72 hours. INR: No results for input(s): INR in the last 72 hours. U/A:No results for input(s): NITRITE, COLORU, PHUR, LABCAST, WBCUA, RBCUA, MUCUS, TRICHOMONAS, YEAST, BACTERIA, CLARITYU, SPECGRAV, LEUKOCYTESUR, UROBILINOGEN, BILIRUBINUR, BLOODU, GLUCOSEU, AMORPHOUS in the last 72 hours. Invalid input(s): KETONESU    XR CHEST PORTABLE   Final Result   1. Extensive findings of interstitial lung disease with improved airspace opacification in comparison to 6/23/2021. ASSESSMENT AND PLAN:    Acute on chronic hypoxic respiratory failure  -hx of TB and Pul fibrosis  - baseline O2 requirement of 3-4 liters at home.   -Pirfenidone stopped (could be the cause) or just progression of fibrosis  - currently maintaining SpO2 of 97-98 percent on 3-4 L   - CXR with \"extensive findings of interstitial lung disease with improved airspace opacification in comparison to 6/23/2021\"  -COVID test neg  - continue INH, rifampin, pyridoxine  - pulmonology consult: appreciate recs  -In addition to the inc dyspnea (BL can walk about 200 feet w/o getting SOB but now cannot move in bed without getting sob) he also has inc cough but no sputum in it.     Hyponatremia  -suspect 2/2 to dehydration  -Will reassess after fluids    Insomnia  - melatonin     Moderate protein calorie malnutrition  - Boost supplements TID with meals    Chronic:  Idiopathic pulm fibrosis  Right BKA (trauma c/b osteomyelitis) - well healed, no acute issues  Hx of Drug induced liver injury 2/2 RIPE  Hx of PTX with lung biopsy    Code Status: Full code  FEN: Regular diet  PPX: Lovenox 40 daily  DISPO: GMF    Will discuss with attending physician Dr. Tavo Finch MD PGY-2  10/17/2021,  11:58 AM

## 2021-10-17 NOTE — PLAN OF CARE
Problem: Falls - Risk of:  Goal: Will remain free from falls  Description: Will remain free from falls  Outcome: Ongoing  Note: Fall precautions in place. Bed alarm on. Call light within reach      Problem: OXYGENATION/RESPIRATORY FUNCTION  Goal: Patient will maintain patent airway  Outcome: Ongoing  Note: Airway remains patent. Goal: Patient will achieve/maintain normal respiratory rate/effort  Description: Respiratory rate and effort will be within normal limits for the patient  Outcome: Ongoing  Note: RR is WNL.  Pt on 3L NC oxygen is %     Problem: NUTRITION  Goal: Nutritional status is improving  Outcome: Ongoing  Note: Pt encouraged to eat/drink fluids

## 2021-10-17 NOTE — ED NOTES
Call placed to nsg supervisor regarding bed status, room waiting to be cleaned, supervisor will call transfer center when ready.       Nicolette Valencia RN  10/16/21 [id number]

## 2021-10-17 NOTE — ED NOTES
Jerrod aware of room number and ETA. Patient lying flat with pillow, resp easy, laughing, states that he feels better.  Wife aware that she will not be able to stay at [address] Dieudonne Locke RN  10/16/21 7909

## 2021-10-17 NOTE — PROGRESS NOTES
4 Eyes Admission Assessment     I agree as the admission nurse that 2 RN's have performed a thorough Head to Toe Skin Assessment on the patient. ALL assessment sites listed below have been assessed on admission. Areas assessed by both nurses:   [x]   Head, Face, and Ears   [x]   Shoulders, Back, and Chest  [x]   Arms, Elbows, and Hands   [x]   Coccyx, Sacrum, and Ischium  [x]   Legs, Feet, and Heels        Does the Patient have Skin Breakdown?   No         Aly Prevention initiated:  NA   Wound Care Orders initiated:  NA      WOC nurse consulted for Pressure Injury (Stage 3,4, Unstageable, DTI, NWPT, and Complex wounds) or Aly score 18 or lower:  NA      Nurse 1 eSignature: Electronically signed by Quique Mcallister RN on 10/17/21 at 5:56 AM EDT    **SHARE this note so that the co-signing nurse is able to place an eSignature**    Nurse 2 eSignature: Electronically signed by Cuauhtemoc Mcdaniel RN on 10/17/21 at 5:44 AM EDT

## 2021-10-18 ENCOUNTER — TELEPHONE (OUTPATIENT)
Dept: PULMONOLOGY | Age: 66
End: 2021-10-18

## 2021-10-18 LAB
ALBUMIN SERPL-MCNC: 2.6 G/DL (ref 3.4–5)
ALP BLD-CCNC: 145 U/L (ref 40–129)
ALT SERPL-CCNC: 23 U/L (ref 10–40)
ANION GAP SERPL CALCULATED.3IONS-SCNC: 7 MMOL/L (ref 3–16)
AST SERPL-CCNC: 50 U/L (ref 15–37)
BASOPHILS ABSOLUTE: 0 K/UL (ref 0–0.2)
BASOPHILS RELATIVE PERCENT: 0.5 %
BILIRUB SERPL-MCNC: 0.6 MG/DL (ref 0–1)
BILIRUBIN DIRECT: <0.2 MG/DL (ref 0–0.3)
BILIRUBIN, INDIRECT: ABNORMAL MG/DL (ref 0–1)
BUN BLDV-MCNC: 12 MG/DL (ref 7–20)
CALCIUM SERPL-MCNC: 8.1 MG/DL (ref 8.3–10.6)
CHLORIDE BLD-SCNC: 98 MMOL/L (ref 99–110)
CO2: 29 MMOL/L (ref 21–32)
CREAT SERPL-MCNC: 0.7 MG/DL (ref 0.8–1.3)
EOSINOPHILS ABSOLUTE: 0.1 K/UL (ref 0–0.6)
EOSINOPHILS RELATIVE PERCENT: 1.8 %
GFR AFRICAN AMERICAN: >60
GFR NON-AFRICAN AMERICAN: >60
GLUCOSE BLD-MCNC: 82 MG/DL (ref 70–99)
GLUCOSE BLD-MCNC: 90 MG/DL (ref 70–99)
HCT VFR BLD CALC: 30.5 % (ref 40.5–52.5)
HEMOGLOBIN: 10.2 G/DL (ref 13.5–17.5)
LYMPHOCYTES ABSOLUTE: 1.1 K/UL (ref 1–5.1)
LYMPHOCYTES RELATIVE PERCENT: 20 %
MAGNESIUM: 2 MG/DL (ref 1.8–2.4)
MCH RBC QN AUTO: 33.7 PG (ref 26–34)
MCHC RBC AUTO-ENTMCNC: 33.5 G/DL (ref 31–36)
MCV RBC AUTO: 100.3 FL (ref 80–100)
MONOCYTES ABSOLUTE: 0.9 K/UL (ref 0–1.3)
MONOCYTES RELATIVE PERCENT: 16.3 %
NEUTROPHILS ABSOLUTE: 3.2 K/UL (ref 1.7–7.7)
NEUTROPHILS RELATIVE PERCENT: 61.4 %
PDW BLD-RTO: 18.1 % (ref 12.4–15.4)
PERFORMED ON: NORMAL
PHOSPHORUS: 3.4 MG/DL (ref 2.5–4.9)
PLATELET # BLD: 276 K/UL (ref 135–450)
PMV BLD AUTO: 8.2 FL (ref 5–10.5)
POTASSIUM SERPL-SCNC: 4.2 MMOL/L (ref 3.5–5.1)
RAPID INFLUENZA  B AGN: NEGATIVE
RAPID INFLUENZA A AGN: NEGATIVE
RBC # BLD: 3.04 M/UL (ref 4.2–5.9)
SODIUM BLD-SCNC: 134 MMOL/L (ref 136–145)
TOTAL PROTEIN: 7.3 G/DL (ref 6.4–8.2)
TSH REFLEX: 0.44 UIU/ML (ref 0.27–4.2)
WBC # BLD: 5.3 K/UL (ref 4–11)

## 2021-10-18 PROCEDURE — 83735 ASSAY OF MAGNESIUM: CPT

## 2021-10-18 PROCEDURE — 99233 SBSQ HOSP IP/OBS HIGH 50: CPT | Performed by: INTERNAL MEDICINE

## 2021-10-18 PROCEDURE — 80069 RENAL FUNCTION PANEL: CPT

## 2021-10-18 PROCEDURE — 2060000000 HC ICU INTERMEDIATE R&B

## 2021-10-18 PROCEDURE — C8923 2D TTE W OR W/O FOL W/CON,CO: HCPCS

## 2021-10-18 PROCEDURE — 85025 COMPLETE CBC W/AUTO DIFF WBC: CPT

## 2021-10-18 PROCEDURE — 87804 INFLUENZA ASSAY W/OPTIC: CPT

## 2021-10-18 PROCEDURE — 6360000004 HC RX CONTRAST MEDICATION: Performed by: SURGERY

## 2021-10-18 PROCEDURE — 80076 HEPATIC FUNCTION PANEL: CPT

## 2021-10-18 PROCEDURE — 36415 COLL VENOUS BLD VENIPUNCTURE: CPT

## 2021-10-18 PROCEDURE — 6370000000 HC RX 637 (ALT 250 FOR IP): Performed by: SURGERY

## 2021-10-18 PROCEDURE — 93325 DOPPLER ECHO COLOR FLOW MAPG: CPT

## 2021-10-18 PROCEDURE — 2580000003 HC RX 258: Performed by: SURGERY

## 2021-10-18 RX ADMIN — Medication 5 MG: at 20:01

## 2021-10-18 RX ADMIN — PYRIDOXINE HCL TAB 50 MG 50 MG: 50 TAB at 09:06

## 2021-10-18 RX ADMIN — SODIUM CHLORIDE, PRESERVATIVE FREE 10 ML: 5 INJECTION INTRAVENOUS at 09:07

## 2021-10-18 RX ADMIN — SODIUM CHLORIDE, PRESERVATIVE FREE 10 ML: 5 INJECTION INTRAVENOUS at 20:00

## 2021-10-18 RX ADMIN — FAMOTIDINE 20 MG: 20 TABLET, FILM COATED ORAL at 09:06

## 2021-10-18 RX ADMIN — ISONIAZID 300 MG: 300 TABLET ORAL at 09:06

## 2021-10-18 RX ADMIN — PERFLUTREN 1.65 MG: 6.52 INJECTION, SUSPENSION INTRAVENOUS at 12:10

## 2021-10-18 RX ADMIN — CHOLECALCIFEROL (VITAMIN D3) 10 MCG (400 UNIT) TABLET 400 UNITS: at 09:06

## 2021-10-18 RX ADMIN — FAMOTIDINE 20 MG: 20 TABLET, FILM COATED ORAL at 20:01

## 2021-10-18 RX ADMIN — RIFAMPIN 600 MG: 300 CAPSULE ORAL at 09:05

## 2021-10-18 ASSESSMENT — PAIN SCALES - GENERAL
PAINLEVEL_OUTOF10: 0

## 2021-10-18 NOTE — PROGRESS NOTES
Internal Medicine    Floor progress note    CC - desaturating to 60s when going to bathroom, SOB, cough    History Obtained From:  patient, electronic medical record    Interval Hx:  Jd Amaral. Patient continues to have shortness of breath that is says is exacerbated by any type of positional changes including turning to his side or sitting up in bed. He says he feels too short of breath to walk across the room. Coughing up scant sputum that appears whitish-yellow in color. He is tolerating PO intake. No fevers, chills, chest pain, n/v/d. Patient received a dose of lasix 20 yesterday and is net -1.5L on admission. VSS, On exam he has inspiratory crackles no auscultation, no peripheral edema. stable on 3L sat in high 90s. Hgb stable at 10.2, no leukocytosis. Electrolytes stable, LFT shows AST 50 and  (stable). Procalc 0.18. ESR 77. CRP 21.9. COVID neg. Echo is pending. Pro-. 127 on prior admission. SOB likely 2/2 to worsening of ILD from TB tx less likely CHF. Continue Abx, f/u echo. HISTORY OF PRESENT ILLNESS:  74 yom hx of active pulmonary TB and IPF (follows with Dr. Elvis Pacheco). Over the last few days he has been experiencing SOB and mild cough for which he presented to [facility] ED. He was found to desaturate into 60-70s upon going to the bathroom. He denies associated fever/chills. He endorses insomnia over the last several weeks. He denies n/v/d. He has been vaccinated for [place] [employer]). He was initiated on RIPE therapy on 7/1/21 and subsequently developed DILI. Upon normalization of LFTs, RIPE was reintroduced. He has completed 3-4 months of triple agent therapy and is now on just INH and rifampin. VBG on admission shows pH 7.34 and pH 53. 9. He received a dose of lasix 20     Past Medical History:        Diagnosis Date    Amputation of leg below knee, right, traumatic, complicated, subsequent encounter     [facility] pulmonary [facility]15/2021    by [facility] [address] [facility]    MTB (Mycobacterium tuberculosis infection) 06/23/2021    Pulmonary fibrosis (H[facility])        Past Surgical History:        Procedure Laterality Date    BRONCHOSCOPY N/A 6/23/2021    BRONCHOSCOPY BRONCHOALVEOLAR LAVAGE performed by Elsie Winn MD at [address] [facility]  6/23/2021    BRONCHOSCOPY BRUSHINGS performed by Elsie Winn MD at [address]  5/21/2021    CT GUIDED CHEST TUBE 5/21/2021 [facility] CT SCAN    CT NEEDLE BIOPSY LUNG PERCUTANEOUS  5/21/2021    CT NEEDLE BIOPSY LUNG PERCUTANEOUS 5/21/2021 [facility] CT SCAN       Medications Priorto Admission:    Medications Prior to Admission: rifAMPin (RIFADIN) 300 MG capsule, Take 600 mg by mouth daily 2 capsules daily  isoniazid (NYDRAZID) 300 MG tablet, Take 300 mg by mouth daily  vitamin B-6 (PYRIDOXINE) 50 MG tablet, Take 50 mg by mouth daily  levoFLOXacin (LEVAQUIN) 750 MG tablet, Take 750 mg by mouth daily  sennosides-docusate sodium (SENOKOT-S) 8.6-50 MG tablet, Take 1 tablet by mouth 2 times daily  ethambutol (MYAMBUTOL) 400 MG tablet, Take 3 tablets by mouth daily  Cholecalciferol (VITAMIN D3 PO), Take by mouth    Allergies:  Patient has no known allergies. Social History:   · TOBACCO:   reports that he has never smoked. He has never used smokeless tobacco.  · ETOH:   reports current alcohol use of about 1.0 standard drinks of alcohol per week. · DRUGS : none  · Patient currently lives with family at home  ·   Family History:   History reviewed. No pertinent family history. Physical Exam  Constitutional:       General: He is not in acute distress. HENT:      Head: Normocephalic and atraumatic. Nose: Nose normal.      Mouth/Throat:      Mouth: Mucous membranes are moist.      Pharynx: Oropharynx is clear. Eyes:      Extraocular Movements: Extraocular movements intact.       Conjunctiva/sclera: Conjunctivae normal.      Pupils: Pupils are equal, round, and reactive to light. Cardiovascular:      Rate and Rhythm: Normal rate and regular rhythm. Pulses: Normal pulses. Heart sounds: Normal heart sounds. Pulmonary:      Effort: Pulmonary effort is normal.      Comments: inspiratory cackles  Abdominal:      General: Abdomen is flat. Bowel sounds are normal.      Palpations: Abdomen is soft. Musculoskeletal:         General: Normal range of motion. Cervical back: Normal range of motion and neck supple. Left lower leg: No edema. Comments: Right BKA   Skin:     General: Skin is warm and dry. Capillary Refill: Capillary refill takes less than 2 seconds. Neurological:      General: No focal deficit present. Mental Status: He is alert and oriented to person, place, and time. .        Vitals:    10/18/21 0903   BP: 108/66   Pulse: 84   Resp: 22   Temp: 96.7 °F (35.9 °C)   SpO2: 99%       DATA:    Labs:  CBC:   Recent Labs     10/16/21  1855 10/17/21  1459 10/18/21  0435   WBC 7.4 6.8 5.3   HGB 10.6* 9.9* 10.2*   HCT 31.4* 29.5* 30.5*    252 276       BMP:   Recent Labs     10/16/21  1855 10/17/21  1459 10/18/21  0435   * 133* 134*   K 4.1 4.9 4.2   CL 98* 99 98*   CO2 28 26 29   BUN 8 11 12   CREATININE 0.6* 0.6* 0.7*   GLUCOSE 98 83 90   PHOS  --  3.2 3.4     LFT's:   Recent Labs     10/16/21  1855 10/17/21  1459 10/18/21  0435   AST 55* 50* 50*   ALT 18 23 23   BILITOT 0.6 1.2* 0.6   ALKPHOS 170* 134* 145*     Troponin:   Recent Labs     10/16/21  1855   TROPONINI <0.01     BNP:No results for input(s): BNP in the last 72 hours. ABGs: No results for input(s): PHART, JYW3YMS, PO2ART in the last 72 hours. INR: No results for input(s): INR in the last 72 hours. U/A:No results for input(s): NITRITE, COLORU, PHUR, LABCAST, WBCUA, RBCUA, MUCUS, TRICHOMONAS, YEAST, BACTERIA, CLARITYU, SPECGRAV, LEUKOCYTESUR, UROBILINOGEN, BILIRUBINUR, BLOODU, GLUCOSEU, AMORPHOUS in the last 72 hours.     Invalid input(s): KETONESU    XR CHEST PORTABLE Final Result   1. Extensive findings of interstitial lung disease with improved airspace opacification in comparison to 6/23/2021.               ASSESSMENT AND PLAN:    Acute on chronic hypoxic respiratory failure likely 2/2 progression of ILD  -hx of TB and Pul fibrosis  - baseline O2 requirement of 3-4 liters at home - stable  -Pirfenidone stopped (could be the cause) vs progression of fibrosis  - currently maintaining SpO2 of 97-98 percent on 3-4 L   - CXR with \"extensive findings of interstitial lung disease with improved airspace opacification in comparison to 6/23/2021\"  -COVID test neg  - continue INH, rifampin, pyridoxine  - pulmonology consult: appreciate recs      Hyponatremia  -suspect 2/2 to dehydration  -Will reassess after fluids    Insomnia  - melatonin     Moderate protein calorie malnutrition  - Boost supplements TID with meals    Chronic:  Idiopathic pulm fibrosis  Right BKA (trauma c/b osteomyelitis) - well healed, no acute issues  Hx of Drug induced liver injury 2/2 RIPE  Hx of PTX with lung biopsy    Code Status: Full code  FEN: Regular diet  PPX: Lovenox 40 daily  DISPO: GMF    Will discuss with attending physician Dr. Marii Mcghee MD PGY-1  10/18/2021,  9:40 AM

## 2021-10-18 NOTE — PROGRESS NOTES
Physical Therapy/Occupational Therapy    Hold note    Referral received, chart reviewed. Pt interview completed but pt refused any mobility due to being SOB this pm. Pt states this is a bad time for him today and he cannot demo any mobility right now. Will f/u 10/19 per pt request. RN aware.      20 minutes spent in evaluation providing education and completing interview prior to pt refusal.       Kaley Aparicio, PT  Rohini Zazueta, MOT, OTR/L

## 2021-10-18 NOTE — PROGRESS NOTES
This nurse went into pt room at ~2010 to respond to pt call light. Assisted pt to bedside commode and pt became hypoxic on 4L NC. spO2 was 77% and . Once he sat on bedside commode his SpO2 increased to 85% on 5L, HR 110s-120s. After pt was done having a BM, this nurse assisted him back to bed. It took ~5 minutes for his oxygen to recover to 94% on 5L NC and HR 110s.

## 2021-10-18 NOTE — PROGRESS NOTES
10/18/21 0839   Resting (Room Air)   SpO2 87   HR 88   Resting (On O2)   SpO2 94   HR 87   O2 Device Nasal cannula   O2 Flow Rate (l/min) 3 l/min   FIO2 (%) 36   During Walk (Room Air)   SpO2 86   HR 85   Walk/Assistance Device Ambulation   Rate of Dyspnea 1   Symptoms Shortness of breath   Comments pt would not walk due to being short of breath, so pt was told to move around in bed in order to access 02 requirement with activity   During Walk (On O2)   SpO2 94   HR 88   O2 Device Nasal cannula   O2 Flow Rate (l/min) 4 l/min   Need Additional O2 Flow Rate Rows Yes   O2 Flow Rate (l/min) 4 l/min   Rate of Dyspnea 1   Symptoms Shortness of breath   Comments with any activity pt requires 4 lpm 02,   After Walk   SpO2 94   HR 88   O2 Device Nasal cannula   O2 Flow Rate (l/min) 3 l/min   Rate of Dyspnea 1   Symptoms Shortness of breath   Does the Patient Qualify for Home O2 Yes   Liter Flow at Rest 3   Liter Flow on Exertion 4   Does the Patient Need Portable Oxygen Tanks Yes

## 2021-10-18 NOTE — TELEPHONE ENCOUNTER
Patient was scheduled to see you today and he has been admitted at [facility] since 10/16  Patient nephew called and would like to speak with you   I let him know that you are seeing patients this afternoon   Basal [phone]

## 2021-10-18 NOTE — CARE COORDINATION
CM spoke with patient's wife over the phone, due to his isolation and he not answering phone. He has cane and walker at home if needed. He is already active with [facility] for home O2, baseline 3-4L, per wife. He has portable O2 and son or nephew to transport home.     Tariq Jimenez, RN, BSN,   4th Floor Progressive Care Unit  [phone]

## 2021-10-18 NOTE — PLAN OF CARE
Problem: Falls - Risk of:  Goal: Will remain free from falls  Description: Will remain free from falls  10/17/2021 2253 by Deja Burnette RN  Outcome: Ongoing  Note: Fall precautions in place. Bed alarm is on. Call light is within reach. Pt educated to use call light  10/17/2021 1117 by Mamie Greenberg RN  Outcome: Ongoing  Goal: Absence of physical injury  Description: Absence of physical injury  10/17/2021 2253 by Deja Burnette RN  Outcome: Ongoing  Note: No s/s of injury   10/17/2021 1117 by Mamie Greenberg RN  Outcome: Ongoing     Problem: OXYGENATION/RESPIRATORY FUNCTION  Goal: Patient will maintain patent airway  10/17/2021 2253 by Deja Burnette RN  Outcome: Ongoing  Note: Airway remains patent. Pt on 3L NC   10/17/2021 1117 by Mamie Greenberg RN  Outcome: Ongoing  Goal: Patient will achieve/maintain normal respiratory rate/effort  Description: Respiratory rate and effort will be within normal limits for the patient  10/17/2021 2253 by Deja Burnette RN  Outcome: Ongoing  Note: PT has hx of lung disease. RR is WNL-increases with exertion.  Pt on 3L NC   10/17/2021 1117 by Mamie Greenberg RN  Outcome: Ongoing     Problem: NUTRITION  Goal: Nutritional status is improving  10/17/2021 2253 by Deja Burnette RN  Outcome: Ongoing  Note: Pt encouraged to eat/drink fluids   10/17/2021 1117 by Mamie Greenberg RN  Outcome: Ongoing

## 2021-10-18 NOTE — PROGRESS NOTES
10/18/21  0435   WBC 7.4 6.8 5.3   HGB 10.6* 9.9* 10.2*   HCT 31.4* 29.5* 30.5*   MCV 98.3 100.3* 100.3*    252 276     BMP:   Recent Labs     10/16/21  1855 10/17/21  1459 10/18/21  0435   * 133* 134*   K 4.1 4.9 4.2   CL 98* 99 98*   CO2 28 26 29   PHOS  --  3.2 3.4   BUN 8 11 12   CREATININE 0.6* 0.6* 0.7*     No results for input(s): PHART, OJN6NII, PO2ART in the last 72 hours. LIVER PROFILE:   Recent Labs     10/16/21  1855 10/17/21  1459 10/18/21  0435   AST 55* 50* 50*   ALT 18 23 23   BILIDIR  --  0.6* <0.2   BILITOT 0.6 1.2* 0.6   ALKPHOS 170* 134* 145*       Radiology Review:  Pertinent images / reports were reviewed as a part of this visit. XR CHEST PORTABLE   Final Result   1. Extensive findings of interstitial lung disease with improved airspace opacification in comparison to 6/23/2021. Abbe Wells is a 74 y.o. male, who was admitted with acute/chronic hypoxic resp failure     1. Acute on chronic hypoxic resp faiulre   2/2 interstitial lung disease and TB. Other etiology is he may have some concurrent heart failure, as TB is know to at time affect the myocardium, percardium and aorta. CXR done on admission appears improved since previous.-Was requiring increased oxygen at home with saturations into the 60's on mabualtion. Presentation apppears similar to previous ED visit on 9/23. CTPA was neagtive for PE and showed stable ILD. His BNP is slightly elevated above baseline   He may have been recently treated with levaquin but patient stated he was not   He is currently requiring 3L NC . Procal, CRP ESR, CoVID, INfluenza pending  He received a dose of steroids on admission.  -Continue Isonaizid + B6 and Rifampin     2. TB   Patient currently on isoniazid + B6 and rifampin      Plan     Plan to get an echo today to investigate if there is any cardiac dysfunction contributing to patient's shortness of breath.   If this is negative, we will proceed to get a high-resolution CT scan to investigate if his interstitial lung disease is worsening which is contributing to his shortness of breath. Patient to continue tuberculosis therapy with isoniazid + B6 and rifampin. Luisito Cruz MD, PGY- 2  10/18/21  11:49 AM    Patient will be staffed and discussed with Iglesia Orta MD    This note was likely completed using voice recognition technology and may contain unintended errors. Patient seen, examined and discussed with the resident and I agree with the assessment and plan. Had a decent response to lasix yesterday, but no change in clinical status. He remains dyspneic at rest and wouldn't get out of bed for a home O2 evaluation. ECHO was performed this morning and we are awaiting results. If there's nothing remarkable then will get a high resolution CT scan. My concern is that this is progression of his ILD/IPF.       Kelly Santos MD

## 2021-10-18 NOTE — PLAN OF CARE
Problem: Falls - Risk of:  Goal: Will remain free from falls  Description: Will remain free from falls  10/18/2021 1137 by Elisa Moreno RN  Note: Pt is a Fall Risk. See [facility] Fall Risk Score. Pt bed in low position and side rails up. Call light and belongings in reach. Pt encouraged to call for assistance. Will continue with hourly rounds for PO intake, pain needs, toileting, and repositioning as needed. Problem: OXYGENATION/RESPIRATORY FUNCTION  Goal: Patient will maintain patent airway  10/18/2021 1137 by Elisa Moreno RN  Note: SpO2 >90% at rest on 4-5L via nasal cannula. Pt reports SOB with activity.

## 2021-10-19 ENCOUNTER — APPOINTMENT (OUTPATIENT)
Dept: CT IMAGING | Age: 66
DRG: 196 | End: 2021-10-19
Payer: MEDICARE

## 2021-10-19 ENCOUNTER — TELEPHONE (OUTPATIENT)
Dept: PULMONOLOGY | Age: 66
End: 2021-10-19

## 2021-10-19 VITALS
OXYGEN SATURATION: 96 % | DIASTOLIC BLOOD PRESSURE: 65 MMHG | HEIGHT: 67 IN | WEIGHT: 128 LBS | SYSTOLIC BLOOD PRESSURE: 104 MMHG | HEART RATE: 81 BPM | BODY MASS INDEX: 20.09 KG/M2 | TEMPERATURE: 98 F | RESPIRATION RATE: 18 BRPM

## 2021-10-19 LAB
ALBUMIN SERPL-MCNC: 2.7 G/DL (ref 3.4–5)
ALP BLD-CCNC: 127 U/L (ref 40–129)
ALT SERPL-CCNC: 25 U/L (ref 10–40)
ANION GAP SERPL CALCULATED.3IONS-SCNC: 4 MMOL/L (ref 3–16)
AST SERPL-CCNC: 52 U/L (ref 15–37)
BASOPHILS ABSOLUTE: 0 K/UL (ref 0–0.2)
BASOPHILS RELATIVE PERCENT: 0.4 %
BILIRUB SERPL-MCNC: 1 MG/DL (ref 0–1)
BILIRUBIN DIRECT: 0.4 MG/DL (ref 0–0.3)
BILIRUBIN, INDIRECT: 0.6 MG/DL (ref 0–1)
BUN BLDV-MCNC: 11 MG/DL (ref 7–20)
CALCIUM SERPL-MCNC: 8.7 MG/DL (ref 8.3–10.6)
CHLORIDE BLD-SCNC: 96 MMOL/L (ref 99–110)
CO2: 29 MMOL/L (ref 21–32)
CREAT SERPL-MCNC: 0.6 MG/DL (ref 0.8–1.3)
D DIMER: 297 NG/ML DDU (ref 0–229)
EKG ATRIAL RATE: 88 BPM
EKG DIAGNOSIS: NORMAL
EKG P AXIS: 15 DEGREES
EKG P-R INTERVAL: 174 MS
EKG Q-T INTERVAL: 374 MS
EKG QRS DURATION: 78 MS
EKG QTC CALCULATION (BAZETT): 452 MS
EKG R AXIS: 13 DEGREES
EKG T AXIS: 5 DEGREES
EKG VENTRICULAR RATE: 88 BPM
EOSINOPHILS ABSOLUTE: 0.1 K/UL (ref 0–0.6)
EOSINOPHILS RELATIVE PERCENT: 2.1 %
GFR AFRICAN AMERICAN: >60
GFR NON-AFRICAN AMERICAN: >60
GLUCOSE BLD-MCNC: 82 MG/DL (ref 70–99)
HCT VFR BLD CALC: 30.8 % (ref 40.5–52.5)
HEMOGLOBIN: 10.4 G/DL (ref 13.5–17.5)
LYMPHOCYTES ABSOLUTE: 1.2 K/UL (ref 1–5.1)
LYMPHOCYTES RELATIVE PERCENT: 19.1 %
MAGNESIUM: 2.1 MG/DL (ref 1.8–2.4)
MCH RBC QN AUTO: 33.5 PG (ref 26–34)
MCHC RBC AUTO-ENTMCNC: 33.8 G/DL (ref 31–36)
MCV RBC AUTO: 99.3 FL (ref 80–100)
MONOCYTES ABSOLUTE: 1 K/UL (ref 0–1.3)
MONOCYTES RELATIVE PERCENT: 14.8 %
NEUTROPHILS ABSOLUTE: 4.2 K/UL (ref 1.7–7.7)
NEUTROPHILS RELATIVE PERCENT: 63.6 %
PDW BLD-RTO: 18.3 % (ref 12.4–15.4)
PHOSPHORUS: 4.2 MG/DL (ref 2.5–4.9)
PLATELET # BLD: 287 K/UL (ref 135–450)
PMV BLD AUTO: 8 FL (ref 5–10.5)
POTASSIUM SERPL-SCNC: 4.7 MMOL/L (ref 3.5–5.1)
RBC # BLD: 3.1 M/UL (ref 4.2–5.9)
SODIUM BLD-SCNC: 129 MMOL/L (ref 136–145)
TOTAL PROTEIN: 7.6 G/DL (ref 6.4–8.2)
WBC # BLD: 6.5 K/UL (ref 4–11)

## 2021-10-19 PROCEDURE — 36415 COLL VENOUS BLD VENIPUNCTURE: CPT

## 2021-10-19 PROCEDURE — 2700000000 HC OXYGEN THERAPY PER DAY

## 2021-10-19 PROCEDURE — 71250 CT THORAX DX C-: CPT

## 2021-10-19 PROCEDURE — 83735 ASSAY OF MAGNESIUM: CPT

## 2021-10-19 PROCEDURE — 85379 FIBRIN DEGRADATION QUANT: CPT

## 2021-10-19 PROCEDURE — 97162 PT EVAL MOD COMPLEX 30 MIN: CPT

## 2021-10-19 PROCEDURE — 93010 ELECTROCARDIOGRAM REPORT: CPT | Performed by: INTERNAL MEDICINE

## 2021-10-19 PROCEDURE — 99233 SBSQ HOSP IP/OBS HIGH 50: CPT | Performed by: INTERNAL MEDICINE

## 2021-10-19 PROCEDURE — 97166 OT EVAL MOD COMPLEX 45 MIN: CPT

## 2021-10-19 PROCEDURE — 80076 HEPATIC FUNCTION PANEL: CPT

## 2021-10-19 PROCEDURE — 97535 SELF CARE MNGMENT TRAINING: CPT

## 2021-10-19 PROCEDURE — 6360000002 HC RX W HCPCS: Performed by: SURGERY

## 2021-10-19 PROCEDURE — 97530 THERAPEUTIC ACTIVITIES: CPT

## 2021-10-19 PROCEDURE — 80069 RENAL FUNCTION PANEL: CPT

## 2021-10-19 PROCEDURE — 94761 N-INVAS EAR/PLS OXIMETRY MLT: CPT

## 2021-10-19 PROCEDURE — 2580000003 HC RX 258: Performed by: SURGERY

## 2021-10-19 PROCEDURE — 85025 COMPLETE CBC W/AUTO DIFF WBC: CPT

## 2021-10-19 PROCEDURE — 99221 1ST HOSP IP/OBS SF/LOW 40: CPT | Performed by: NURSE PRACTITIONER

## 2021-10-19 PROCEDURE — 6370000000 HC RX 637 (ALT 250 FOR IP): Performed by: SURGERY

## 2021-10-19 RX ADMIN — SODIUM CHLORIDE, PRESERVATIVE FREE 10 ML: 5 INJECTION INTRAVENOUS at 09:29

## 2021-10-19 RX ADMIN — ENOXAPARIN SODIUM 40 MG: 40 INJECTION SUBCUTANEOUS at 09:28

## 2021-10-19 RX ADMIN — PYRIDOXINE HCL TAB 50 MG 50 MG: 50 TAB at 09:29

## 2021-10-19 RX ADMIN — FAMOTIDINE 20 MG: 20 TABLET, FILM COATED ORAL at 09:29

## 2021-10-19 RX ADMIN — CHOLECALCIFEROL (VITAMIN D3) 10 MCG (400 UNIT) TABLET 400 UNITS: at 09:29

## 2021-10-19 RX ADMIN — RIFAMPIN 600 MG: 300 CAPSULE ORAL at 09:29

## 2021-10-19 ASSESSMENT — ENCOUNTER SYMPTOMS
VOMITING: 0
WHEEZING: 0
CHOKING: 0
CONSTIPATION: 0
DIARRHEA: 0
NAUSEA: 0
SHORTNESS OF BREATH: 1
COUGH: 1
ABDOMINAL DISTENTION: 0

## 2021-10-19 ASSESSMENT — PAIN SCALES - GENERAL
PAINLEVEL_OUTOF10: 0

## 2021-10-19 NOTE — CONSULTS
[facility]  Palliative Medicine Consultation Note      Date Of Admission:10/16/2021  Date of consult: 10/19/21  Seen by [facility] in the past:  No    Recommendations:        Met with the pt at the bedside, along with Dr. Timur Sánchez. Dr. Timur Sánchez discussed pt's condition, concern for rapidly progressing IPF and unfortunate lack of treatment options. Dr. Timur Sánchez explained that his life expectancy was likely measured in months. Pt's nephew Moses Roger also on the phone. Pt expressed understanding, disappointment at this news. They reported that they were considering getting a second opinion. Discussed in that case they could utilize outpatient palliative care for support while they are seeking an opinion with another pulmonologist. Also discussed hospice, in the event that the pt worsens quickly or that the second pulmonologist recommends this. They were open to that. We also briefly discussed code status, however pt expressed feeling overwhelmed and we agreed to meet again tomorrow after he has had time to process new information. Checking to see if pt's wife can visit him in his room given his new poor prognosis. 1. Goals of Care/Advanced Care planning/Code status: Full code, discussed today, agree to discuss again tomorrow. Plan at this time is to continue with current management, family considering seeking a second opinion about pt's IPF and are open to outpatient palliative care referral in the meantime. 2. Pain: Pt denies  3. SOB/acute on chronic respiratory failure likely 2/2 progression of ILD: Pt endorses some SOB, although improved since admission. He is on 4LNC. Pt follows with Dr. Awilda Rizzo for his IPF, CT scan showing progression today. Pulmonology is following.    4. Disposition: Planning for home with outpatient palliative care when medically ready for discharge    Reason for Consult:         [x]  Goals of Care  [x]  Code Status Discussion/Advanced Care Planning   [x]  Psychosocial/Family Support  []  Symptom Management  []  Other (Specify)    Requesting Physician: Dr. Mauro Racer:  SOB, cough    History Obtained From:  patient, electronic medical record    History of Present Illness:         Sabi Thomson is a 74 y.o. male with PMH of pulomary TB and IPF (follows with Dr Dainaa Williamson) who presented with SOB and cough to [facility] ED. On evaluation he was found to desaturate to 60s-70s on walking to the bathroom. He was initiated on RIPE therapy on 7/1/21 and subsequently developed DILI. Upon normalization of LFTs, RIPE was reintroduced. He has completed 3-4 months of triple agent therapy and is now on just INH and rifampin. Pt currently requiring 4LNC, however per Dr. Lala Dave CT scan does show rapid progression of IPF.      Subjective:         Past Medical History:        Diagnosis Date    Amputation of leg below knee, right, traumatic, complicated, subsequent encounter     Confirmed pulmonary tuberculosis 07/15/2021    by [address]    MTB (Mycobacterium tuberculosis infection) 06/23/2021    Pulmonary fibrosis ([facility] [address]        Past Surgical History:        Procedure Laterality Date    BRONCHOSCOPY N/A 6/23/2021    BRONCHOSCOPY BRONCHOALVEOLAR LAVAGE performed by Blanka Angelo MD at [address]  6/23/2021    BRONCHOSCOPY BRUSHINGS performed by Blanka Angelo MD at [address]  5/21/2021    CT GUIDED CHEST TUBE 5/21/2021 [facility] CT SCAN    CT NEEDLE BIOPSY LUNG PERCUTANEOUS  5/21/2021    CT NEEDLE BIOPSY LUNG PERCUTANEOUS 5/21/2021 [address] SCAN       Current Medications:    Medications Prior to Admission: rifAMPin (RIFADIN) 300 MG capsule, Take 600 mg by mouth daily 2 capsules daily  isoniazid (NYDRAZID) 300 MG tablet, Take 300 mg by mouth daily  vitamin B-6 (PYRIDOXINE) 50 MG tablet, Take 50 mg by mouth daily  levoFLOXacin (LEVAQUIN) 750 MG tablet, Take 750 mg by mouth daily  sennosides-docusate sodium (SENOKOT-S) 8.6-50 MG tablet, Take 1 tablet by mouth 2 times daily  ethambutol (MYAMBUTOL) 400 MG tablet, Take 3 tablets by mouth daily  Cholecalciferol (VITAMIN D3 PO), Take by mouth    Allergies:  Patient has no known allergies. Social History:    · TOBACCO: reports that he has never smoked. He has never used smokeless tobacco.  · ETOH:   reports current alcohol use of about 1.0 standard drinks of alcohol per week. · Patient currently lives with family wife, son    Review of Systems -   Review of Systems: A 10 point review of systems was conducted, significant findings as notedin HPI. Objective:          Physical Exam  Constitutional:       General: He is not in acute distress. Cardiovascular:      Rate and Rhythm: Normal rate and regular rhythm. Heart sounds: Normal heart sounds. Pulmonary:      Effort: Pulmonary effort is normal.      Breath sounds: Normal breath sounds. Abdominal:      General: Bowel sounds are normal.      Palpations: Abdomen is soft. Musculoskeletal:      Right lower leg: No edema. Left lower leg: No edema. Skin:     General: Skin is warm and dry. Neurological:      Mental Status: He is alert and oriented to person, place, and time. Palliative Performance Scale:  [x] 60% Ambulation reduced; Significant disease; Can't do hobbies/housework; intake normal or reduced; occasional assist; LOC full/confusion  [] 50% Mainly sit/lie; Extensive disease; Can't do any work; Considerable assist; intake normal  Or reduced; LOC full/confusion  [] 40% Mainly in bed; Extensive disease; Mainly assist; intake normal or reduced; occasional assist; LOC full/confusion  [] 30% Bed Bound; Extensive disease; Total care; intake reduced; LOC full/confusion  [] 20% Bed Bound; Extensive disease; Total care; intake minimal; Drowsy/coma  [] 10% Bed Bound; Extensive disease;  Total care; Mouth care only; Drowsy/coma  [] 0% Death    PPS: 60    Vitals: /66   Pulse 96   Temp 97.5 °F (36.4 °C) (Oral)   Resp 20   Ht 5' 7\" (1.702 m)   Wt 128 lb (58.1 kg)   SpO2 94%   BMI 20.05 kg/m²     Labs:    BMP:   Recent Labs     10/17/21  1459 10/18/21  0435 10/19/21  0421   * 134* 129*   K 4.9 4.2 4.7   CL 99 98* 96*   CO2 26 29 29   BUN 11 12 11   CREATININE 0.6* 0.7* 0.6*   GLUCOSE 83 90 82     CBC:   Recent Labs     10/17/21  1459 10/18/21  0435 10/19/21  0421   WBC 6.8 5.3 6.5   HGB 9.9* 10.2* 10.4*   HCT 29.5* 30.5* 30.8*    276 287       LFT's:   Recent Labs     10/17/21  1459 10/18/21  0435 10/19/21  0421   AST 50* 50* 52*   ALT 23 23 25   BILITOT 1.2* 0.6 1.0   ALKPHOS 134* 145* 127     Troponin:   Recent Labs     10/16/21  1855   TROPONINI <0.01     BNP: No results for input(s): BNP in the last 72 hours. ABGs: No results for input(s): PHART, DET0OYR, PO2ART in the last 72 hours. INR: No results for input(s): INR in the last 72 hours. U/A:No results for input(s): NITRITE, COLORU, PHUR, LABCAST, WBCUA, RBCUA, MUCUS, TRICHOMONAS, YEAST, BACTERIA, CLARITYU, SPECGRAV, LEUKOCYTESUR, UROBILINOGEN, BILIRUBINUR, BLOODU, GLUCOSEU, AMORPHOUS in the last 72 hours. Invalid input(s): KETONESU    XR CHEST PORTABLE   Final Result   1. Extensive findings of interstitial lung disease with improved airspace opacification in comparison to 6/23/2021. CT CHEST HIGH RESOLUTION    (Results Pending)         Conclusion/Time spent:         Recommendations see above    Time spent with patient and/or family: 20  Time reviewing records: 10 min   Time communicating with staff: 5 min     A total of 35 minutes spent with the patient and family on unit greater than 50% in counseling regarding palliative care and in goals of care for the patient. Thank you to Dr. Jeff Lares for this consultation. We will continue to follow Mr. Schwartz Blocker care as needed.     [address]  Inpatient Palliative Care  [phone]

## 2021-10-19 NOTE — TELEPHONE ENCOUNTER
I called jessica and explained to him we can't do much till he finish the TB treatment.   I also explained he may not have time to finish the TB treatment as he is declining fast.

## 2021-10-19 NOTE — PROGRESS NOTES
Physical Therapy    Facility/Department: [place] [address] [facility]  Initial Assessment    NAME: Sabi Thomson  : 1955  MRN: [id number]    Date of Service: 10/19/2021    Discharge Recommendations:Percy Lindsey scored a 19/24 on the AM-PAC short mobility form. Current research shows that an AM-PAC score of 17 or less is typically not associated with a discharge to the patient's home setting. Based on the patient's AM-PAC score and their current functional mobility deficits, it is recommended that the patient have 3-5 sessions per week of Physical Therapy at d/c to increase the patient's independence. Please see assessment section for further patient specific details. If patient discharges prior to next session this note will serve as a discharge summary. Please see below for the latest assessment towards goals. PT Equipment Recommendations  Equipment Needed: No    Assessment   Assessment: 75 yo admitted 10/16/21 for hypoxia. Pt demo limited activity tolerance this date as he desats on 4L 02 with transfers/bed mobility. Pt plans to return home with assist per wife and states they can stay on first floor apt at the [facility]. If home, recommend 24 hour close assist, home PT, use of w/c (has) initially due to low activity tolerance. Despite higher AMPAC score, pt may benefit from continued skilled IP PT at discharge to maximize his functional independence prior to d/c home. Treatment Diagnosis: mobiility impairment due to hypoxia  Decision Making: Medium Complexity  Patient Education: Pt educated on PT role, importance of OOB mobility, need to call for assist to get up and he verbalized understanding. REQUIRES PT FOLLOW UP: Yes  Activity Tolerance  Activity Tolerance: Patient limited by fatigue  Activity Tolerance: Pt desat to 80% on 4L 02 with activity and required 1-2 mins to recover to 90% with pursed lip breathing. Poor activity tolerance overall.        Patient Diagnosis(es): The primary encounter diagnosis was Acute on chronic respiratory failure with hypoxia ([facility] [address] A diagnosis of SOB (shortness of breath) on exertion was also pertinent to this visit. has a past medical history of Amputation of leg below knee, right, traumatic, complicated, subsequent encounter, Confirmed pulmonary tuberculosis, MTB (Mycobacterium tuberculosis infection), and Pulmonary fibrosis ([facility] [address] has a past surgical history that includes CT NEEDLE BIOPSY LUNG PERCUTANEOUS (5/21/2021); CT GUIDED CHEST TUBE (5/21/2021); bronchoscopy (N/A, 6/23/2021); and bronchoscopy (6/23/2021). Restrictions  Position Activity Restriction  Other position/activity restrictions: up with assist     Vision/Hearing  Vision: Impaired  Vision Exceptions: Wears glasses at all times  Hearing: Within functional limits       Subjective  General  Chart Reviewed: Yes  Additional Pertinent Hx: 74 y.o. male, who was admitted with acute/chronic hypoxic resp failure 2/2 interstitial lung disease and TB. Pulmonology consult. Pmhx: pulmonary fibrosis, R traumatic BKA. Family / Caregiver Present: No  Diagnosis: acute hypoxic respiratory failure  Follows Commands: Within Functional Limits  Subjective  Subjective: Pt found supine in bed and agreeable to PT. \" My breathing is much better today.  \"  Pain Screening  Patient Currently in Pain: Denies         Orientation  Orientation  Overall Orientation Status: Within Functional Limits     Social/Functional History  Social/Functional History  Lives With: Spouse (wife available all the time)  Type of Home:  ([facility])  Home Layout: One level  Home Access: Level entry (15 steps to 2nd floor apt (wife assists); state they can stay in first floor apt)  Bathroom Shower/Tub: Tub/Shower unit  Bathroom Toilet: Standard  Home Equipment: Oxygen, Rolling walker, Wheelchair-manual (home 02 3-4L baseline; R BKA prosthesis)  ADL Assistance:  (assist per wife in/out shower)  Homemaking Assistance:  (wife/facility perform)  Ambulation Assistance:  (independent with RW and prosthesis)  Transfer Assistance: Independent  Active : No  Additional Comments: Pt denies any recent falls. Objective          AROM RLE (degrees)  RLE AROM: WFL  AROM LLE (degrees)  LLE AROM : WFL     Strength RLE  Strength RLE: WFL  Strength LLE  Strength LLE: WFL        Bed mobility  Supine to Sit: Independent  Sit to Supine: Independent  Scooting: Independent     Transfers  Sit to Stand: Supervision (at bedside)  Stand to sit: Supervision  Bed to Chair: Supervision (bed to/from [facility] with hand hold on arm rest)  Ambulation  Ambulation?: No (declined ambulation trial due to SOLORZANO)     Balance  Sitting - Static: Good  Sitting - Dynamic: Good  Standing - Static: Fair;+  Comments: Pt able to stand bedside x10 sec on 4L 02 with hand hold on bedrail.         Plan   Plan  Times per week: 2-5  Current Treatment Recommendations: Strengthening, Transfer Training, Wheelchair Mobility Training  Safety Devices  Type of devices: Bed alarm in place, Call light within reach, Left in bed, Nurse notified (pt refused to sit in chair due to SOB)             AM-PAC Score  -PAC Inpatient Mobility Raw Score : 19 (10/19/21 0917)  AM-PAC Inpatient T-Scale Score : 45.44 (10/19/21 0917)  Mobility Inpatient CMS 0-100% Score: 41.77 (10/19/21 0917)  Mobility Inpatient CMS G-Code Modifier : CK (10/19/21 0917)          Goals  Short term goals  Time Frame for Short term goals: discharge  Short term goal 1: sit to/from stand independent  Short term goal 2: independent LE HEP x10 reps  Short term goal 3: w/c propulsion x50 ft independent  Patient Goals   Patient goals : return home       Therapy Time   Individual Concurrent Group Co-treatment   Time In 0825         Time Out 0903         Minutes 38           Timed Code Treatment Minutes: 28      Total Treatment Minutes:  [address] Kelly Osborne PT

## 2021-10-19 NOTE — CARE COORDINATION
Case Management Assessment           Daily Note                 Date/ Time of Note: 10/19/2021 4:16 PM         Note completed by: Alisia Ricketts RN    Patient Name: Lesly Storey  YOB: 1955    Diagnosis:Hypoxia [R09.02]  SOB (shortness of breath) on exertion [R06.02]  Acute on chronic respiratory failure with hypoxia ([address] [J96.21]  Patient Admission Status: Inpatient    Date of Admission:10/16/2021  2:41 PM Length of Stay: 3 GLOS: GMLOS: 4.8    Current Plan of Care: 4L O2, pulm following   ________________________________________________________________________________________  PT AM-PAC: 19 / 24 per last evaluation on: 10/18    OT AM-PAC: 19 / 24 per last evaluation on: 10/18    DME Needs for discharge: n/a  ________________________________________________________________________________________  Discharge Plan: Home with [address] [place] [address] Way: tbd    Tentative discharge date: 10/20    Current barriers to discharge: medical clearance    ________________________________________________________________________________________  Case Management Notes:  Patient is from home with spouse, plans to return home at discharge, active with [address] for home [address], may need HHC at discharge, spouse agreeable to outpatient palliative consult. Jennifer Moreno and his family were provided with choice of provider; he and his family are in agreement with the discharge plan.     Care Transition Patient: Mercy Ricketts RN  [facility]  Case Management Department  Ph: [phone]  Fax: [phone]

## 2021-10-19 NOTE — PROGRESS NOTES
Pulmonary Followup Note    Indication for visit:   Hypoxia, shortness of breath, cough    Subjective:  Patient seen and examined at bedside. No acute events overnight. Afebrile. Saturating 94% on 4L NC. Decent UOP 2L. Endorses some productive cough, but overall feels much better than yesterday. Wishes to move out of isolation so his wife can visit.  sodium chloride flush  5-40 mL IntraVENous 2 times per day    enoxaparin  40 mg SubCUTAneous Daily    famotidine  20 mg Oral BID    vitamin D3  400 Units Oral Daily    isoniazid  300 mg Oral Daily    rifAMPin  600 mg Oral Daily    vitamin B-6  50 mg Oral Daily    melatonin  5 mg Oral Nightly    sodium chloride  1,000 mL IntraVENous Once       Continuous Infusions:   sodium chloride         ROS:   (+) Dyspnea on exertion, productive cough. (-) Fever, chills, nausea, vomiting, chest pain, diarrhea, constipation, dysuria, increased frequency or urgency. PHYSICAL EXAMINATION:  /66   Pulse 96   Temp 97.5 °F (36.4 °C) (Oral)   Resp 20   Ht 5' 7\" (1.702 m)   Wt 128 lb (58.1 kg)   SpO2 94%   BMI 20.05 kg/m²     Gen: Well developed, well nourished and not in no acute distress. Speaking in full sentences with out using accessory resp muscles  Eyes: PERRL, EOMI, normal conjunctivae  Ears, Nose, Mouth and Throat: Hearing is normal. Oropharynx is normal  Neck: No lymphadenopathy  Respiratory: Currently on 4L NC. Diffuse crackles bilaterally. CV: RRR without M/R/R  Abd: +BS, soft, NT/ND  Musculoskeletal: No cyanosis, clubbing, or edema.   Skin: No rashes, ulcers, or subcutaneous nodules  Psychiatric: Alert and oriented to time place and person    DATA  CBC:   Recent Labs     10/17/21  1459 10/18/21  0435 10/19/21  0421   WBC 6.8 5.3 6.5   HGB 9.9* 10.2* 10.4*   HCT 29.5* 30.5* 30.8*   .3* 100.3* 99.3    276 287     BMP:   Recent Labs     10/17/21  1459 10/18/21  0435 10/19/21  0421   * appeared to be functioning adequately. Based on these echo results I did go ahead and order a high-resolution CT scan. The report from radiology is pending but I reviewed it myself and with Dr. Saint Spates his primary pulmonologist as well as Dr. Evelyn Stevenson the hospitalist and we all agree that it looks substantially worse than it did prior. There is what appears to be tangible progression from the scan done 3 weeks ago. This is unfortunately a bad sign and I tried to intimate as much with Mr. Gunn Public this morning that I was concerned this was progression of his interstitial lung disease and that there were not treatments that would help. There are some antichelating agents with pirfenidone and ninatinib (Esbriet and Ofev), but with his recent hepatitis from TB medications it is contraindicated. Plus it does not actually improve IPF it just slows progression and may provide a small survival benefit. The other treatment option for someone with rapidly progressing IPF is lung transplant, however patient is on the upper limit of the age range that is allowed and with his active tuberculosis treatments he would not be a candidate for transplant, possibly ever. Unfortunately the area of his lungs where he has the TB is the one area of his lung that actually looks to be improved. The thickness in the ball of inflammation has mostly resolved and he just has a moderate cavitary lesion in its place. Palliative care has been consulted and patient is most appropriate for something like outpatient palliative care or even hospice. I think it is unlikely based on the progression of his CT scan that he will survive till the end of the year but he may be able to go a bit longer than that.         Dana Diaz MD

## 2021-10-19 NOTE — PROGRESS NOTES
Progress Note  PGY-1    Admit Date: 10/16/2021  Day: 3  Diet: ADULT DIET; Regular    CC: Desaturating to 60s when going to bathroom, SOB, cough    Interval history:   Patient seen at bedside. No acute overnight events reported. Vitals have remained stable. Patient continues on 4 L of nasal cannula with oxygen saturation at 94-98%. Has been afebrile over the last 24 hrs   He reports that there is still a cough productive of scant whitish sputum with some shortness of breath however he does note that he feels much improved compared to yesterday and now is able to sit up in bed with no issues. He denies any chills, N/P, chest pain, abdominal pain, diarrhea or constipation. Good urine output in the last 24 h 2.1 L.     HPI:   74 yom hx of active pulmonary TB and IPF (follows with Dr. David Altman). Over the last few days he has been experiencing SOB and mild cough for which he presented to [facility] ED. He was found to desaturate into 60-70s upon going to the bathroom. He denies associated fever/chills. He endorses insomnia over the last several weeks. He denies n/v/d. He has been vaccinated for [place] [employer]).      He was initiated on RIPE therapy on 7/1/21 and subsequently developed DILI. Upon normalization of LFTs, RIPE was reintroduced. He has completed 3-4 months of triple agent therapy and is now on just INH and rifampin. VBG on admission shows pH 7.34 and pH 53. 9. He received a dose of lasix 20     Medications:     Scheduled Meds:   sodium chloride flush  5-40 mL IntraVENous 2 times per day    enoxaparin  40 mg SubCUTAneous Daily    famotidine  20 mg Oral BID    vitamin D3  400 Units Oral Daily    isoniazid  300 mg Oral Daily    rifAMPin  600 mg Oral Daily    vitamin B-6  50 mg Oral Daily    melatonin  5 mg Oral Nightly    sodium chloride  1,000 mL IntraVENous Once     Continuous Infusions:   sodium chloride       PRN Meds:sodium chloride flush, sodium chloride, ondansetron **OR** ondansetron, polyethylene glycol, acetaminophen **OR** acetaminophen, traZODone    Objective:   Vitals:   T-max:  Patient Vitals for the past 8 hrs:   BP Temp Temp src Pulse Resp SpO2   10/19/21 0515 101/68 97.2 °F (36.2 °C) Oral 85 20 98 %   10/19/21 0117 100/65 97 °F (36.1 °C) Oral 80 20 --       Intake/Output Summary (Last 24 hours) at 10/19/2021 0643  Last data filed at 10/19/2021 0515  Gross per 24 hour   Intake 1250 ml   Output 2125 ml   Net -875 ml       Review of Systems   Constitutional: Negative for chills, diaphoresis, fatigue and fever. Respiratory: Positive for cough and shortness of breath. Negative for choking and wheezing. Cardiovascular: Negative for chest pain, palpitations and leg swelling. Gastrointestinal: Negative for abdominal distention, constipation, diarrhea, nausea and vomiting. Genitourinary: Negative for dysuria. Neurological: Negative. Physical Exam  Constitutional:       Appearance: He is not ill-appearing, toxic-appearing or diaphoretic. HENT:      Mouth/Throat:      Mouth: Mucous membranes are moist.      Pharynx: Oropharynx is clear. Eyes:      Pupils: Pupils are equal, round, and reactive to light. Cardiovascular:      Rate and Rhythm: Normal rate and regular rhythm. Pulses: Normal pulses. Pulmonary:      Effort: No respiratory distress. Breath sounds: Rales (bilaterally and mainly to lower lungs) present. Abdominal:      General: Abdomen is flat. Bowel sounds are normal. There is no distension. Tenderness: There is no abdominal tenderness. There is no guarding. Musculoskeletal:         General: No swelling. Right lower leg: No edema. Left lower leg: No edema. Skin:     General: Skin is warm and dry. Neurological:      General: No focal deficit present. Mental Status: He is alert and oriented to person, place, and time. Mental status is at baseline.          LABS:    CBC:   Recent Labs     10/17/21  1459 10/18/21  0435 10/19/21  0421   WBC 6.8 5.3 6.5   HGB 9.9* 10.2* 10.4*   HCT 29.5* 30.5* 30.8*    276 287   .3* 100.3* 99.3     Renal:    Recent Labs     10/17/21  1459 10/18/21  0435 10/19/21  0421   * 134* 129*   K 4.9 4.2 4.7   CL 99 98* 96*   CO2 26 29 29   BUN 11 12 11   CREATININE 0.6* 0.7* 0.6*   GLUCOSE 83 90 82   CALCIUM 8.0* 8.1* 8.7   MG 2.00 2.00 2.10   PHOS 3.2 3.4 4.2   ANIONGAP 8 7 4     Hepatic:   Recent Labs     10/17/21  1459 10/18/21  0435 10/19/21  0421   AST 50* 50* 52*   ALT 23 23 25   BILITOT 1.2* 0.6 1.0   BILIDIR 0.6* <0.2 0.4*   PROT 7.1 7.3 7.6   LABALBU 2.3* 2.6* 2.7*   ALKPHOS 134* 145* 127     Troponin:   Recent Labs     10/16/21  1855   TROPONINI <0.01     BNP: No results for input(s): BNP in the last 72 hours. Lipids: No results for input(s): CHOL, HDL in the last 72 hours. Invalid input(s): LDLCALCU, TRIGLYCERIDE  ABGs:  No results for input(s): PHART, CEI7YMC, PO2ART, GXK7GKW, BEART, THGBART, T3NUHUST, EDU4GUW in the last 72 hours. INR: No results for input(s): INR in the last 72 hours. Lactate: No results for input(s): LACTATE in the last 72 hours. Cultures:  -----------------------------------------------------------------  RAD:   XR CHEST PORTABLE   Final Result   1. Extensive findings of interstitial lung disease with improved airspace opacification in comparison to 6/23/2021. Assessment/Plan:   Valente Campbell is a 74 y.o. male with ILD, pulmonary TB on RIP who presented for SOB and mild cough with desaturation with exertion. Acute on chronic hypoxic respiratory failure likely 2/2 progression of ILD  Patient with history of tuberculosis and pulmonary fibrosis likely secondary to TB drug. He had been on baseline home oxygen of 3 to 4 L however as of late, he had been complaining of desaturations, shortness of breath with mild exertion.   Chest x-ray done with extensive findings of interstitial lung disease with improved airspace opacification in comparison to 6/2021. Covid test negative  ECHO (05/90) Normal systolic function. EF 55-60%. Mireya Ion appears enlarged and hypokinetic.  -Continuing home isoniazid and rifampin  -Pending CT chest high res today  -Pulmonology following    Hyponatremia  Na: 132-> 129  Upon chart review patient has had hyponatremia over the past year. 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 Hx of PTX with lung biopsy    Disposition: Pt on 4 L which has been his baseline. Pending CT chest. Upon review and discussion with pulmonology, to decide whether pt continues to need inpatient management or could go home. Code Status: Full Code   FEN: ADULT DIET;  Regular  PPX: Lovenox  DISPO: Lissy Lundberg MD, PGY-1  Internal Medicine Resident  Contact via [address] [place] [address]  10/19/21  6:43 AM    This patient has been staffed and discussed with Laura Arceo MD.

## 2021-10-19 NOTE — TELEPHONE ENCOUNTER
Patient malka Hudson calls office asking to speak with Dr Alanna Kaba.    Dr Jorge Turcios is working the floors at [facility] and is seeing after patient   States he needs to talk with Dr Alanna Kaba about discharge instructions   Can be reached at [phone]

## 2021-10-20 NOTE — DISCHARGE SUMMARY
INTERNAL MEDICINE DEPARTMENT  DISCHARGE SUMMARY    Patient ID: George Casper                                             Discharge Date: 10/20/2021   Patient's PCP: Ayush Cole DO                                          Discharge Physician: Roseanna Orourke MD   Admit Date: 10/16/2021   Admitting Physician: Janice Pham MD    PROBLEMS DURING HOSPITALIZATION:  Present on Admission:   Hypoxia      DISCHARGE DIAGNOSES:  1. Acute on chronic hypoxic respiratory failure 2/2 progression of ILD  2. Hyponatremia -chronic  3. Idiopathic Pulmonary fibrosis  4. Right BKA  5. History of Drug induced liver injury      Hospital Course:     74 yom hx of active pulmonary TB and IPF (follows with Dr. Evens Cornelius). Over the last few days he has been experiencing SOB and mild cough for which he presented to [facility] ED. He was found to desaturate into 60-70s upon going to the bathroom. He denies associated fever/chills. He endorses insomnia over the last several weeks. He denies n/v/d. He has been vaccinated for [place] [employer]).      He was initiated on RIPE therapy on 7/1/21 and subsequently developed DILI. Upon normalization of LFTs, RIPE was reintroduced. He has completed 3-4 months of triple agent therapy and is now on just INH and rifampin. VBG on admission shows pH 7.34 and pH 53. 9. He received a dose of lasix 20. COVID-19 was ruled out as a cause of his worsening oxygenation. Echocardiogram was done and revealed normal systolic function though right ventricle appeared enlarged and hypokinetic. High resolution CT Chest was done which revealed worsening of diffuse interstitial pulmonary fibrosis with advanced pulmonary bronchiectasis and bronchiolectasis. Pt was made aware of his worsening pulmonary function. There will be a plan for ongoing outpatient palliative discussions and family may seek second opinion about his IPF.      On the basis of discharge, patient reported feeling stable on 4 L  of oxygen which is his home baseline. The patient was found to not be in any acute distress, with vital signs within normal limits, and no acute abnormalities on physical examination. Further, the patient expressed appropriate understanding of, and agreement with, the discharge recommendations, medications and plan. Physical Exam:  Vitals: /65   Pulse 81   Temp 98 °F (36.7 °C) (Oral)   Resp 18   Ht 5' 7\" (1.702 m)   Wt 128 lb (58.1 kg)   SpO2 96%   BMI 20.05 kg/m²   I/O :     Intake/Output Summary (Last 24 hours) at 10/20/2021 0514  Last data filed at 10/19/2021 1645  Gross per 24 hour   Intake 440 ml   Output 425 ml   Net 15 ml         General appearance: alert, appears stated age and cooperative  HEENT: Head: Normocephalic, no lesions, without obvious abnormality. Lungs: clear to auscultation bilaterally and diminished breath sounds bilaterally  Heart: regular rate and rhythm, S1, S2 normal, no murmur, click, rub or gallop  Abdomen: soft, non-tender; bowel sounds normal; no masses,  no organomegaly  Extremities: extremities normal, atraumatic, no cyanosis or edema  Neurologic: Mental status: Alert, oriented, thought content appropriate    Labs:  For convenience and continuity at follow-up the following most recent labs are provided:      CBC:    Lab Results   Component Value Date    WBC 6.5 10/19/2021    HGB 10.4 10/19/2021    HCT 30.8 10/19/2021     10/19/2021       Renal:    Lab Results   Component Value Date     10/19/2021    K 4.7 10/19/2021    K 4.1 10/16/2021    CL 96 10/19/2021    CO2 29 10/19/2021    BUN 11 10/19/2021    CREATININE 0.6 10/19/2021    CALCIUM 8.7 10/19/2021    PHOS 4.2 10/19/2021       Consults: Pulmonology, Palliative  Significant Diagnostic Studies:    CXR, High Res CT chest    Treatments:  Supplemental Oxygen    Disposition: Home  Discharged Condition: Stable  Follow Up: w/ Pulmonologist Dr Ke Villagran MD     DISCHARGE MEDICATION:     Medication List      CONTINUE taking these medications    isoniazid 300 MG tablet  Commonly known as: NYDRAZID     rifAMPin 300 MG capsule  Commonly known as: RIFADIN     sennosides-docusate sodium 8.6-50 MG tablet  Commonly known as: SENOKOT-S  Take 1 tablet by mouth 2 times daily     vitamin B-6 50 MG tablet  Commonly known as: PYRIDOXINE     VITAMIN D3 PO        STOP taking these medications    ethambutol 400 MG tablet  Commonly known as: MYAMBUTOL     levoFLOXacin 750 MG tablet  Commonly known as: LEVAQUIN          Activity: activity as tolerated  Diet: regular diet  Wound Care: none needed  Discharge Instructions:    Will need to remain on 4L of Oxygen at discharge  Please keep previously scheduled appointment with Pulmonologist.     Time Spent on discharge is more than 45 minutes    Signed:  Mansoor Lane MD,  PGY-1  10/20/2021

## 2021-10-20 NOTE — CARE COORDINATION
Patient is interested in [facility] [place]. per Case management call. Has been active with Asaf. Patient aware and agreeable to services. Called Intake with referral [phone].  Ohioshantal will pull referral from [facility] and call patient to arrange services for [facility] by 10/21/21  Electronically signed by Joel Bolton LPN on 67/00/4162 at 10:22 AM

## 2021-10-21 ENCOUNTER — TELEPHONE (OUTPATIENT)
Dept: PRIMARY CARE CLINIC | Age: 66
End: 2021-10-21

## 2021-10-21 NOTE — TELEPHONE ENCOUNTER
Nephew called - pulmonologist called said uncle will not have enough time to complete  TB medication. Cant get lung transplant until that.  Just want Dr. Ria Fleischer know

## 2021-10-25 ENCOUNTER — TELEPHONE (OUTPATIENT)
Dept: PULMONOLOGY | Age: 66
End: 2021-10-25

## 2021-10-25 NOTE — TELEPHONE ENCOUNTER
Nephew called asking if pt is a candidate for a lung transplant.  He would like to speak to Dr. Navarro Shows [phone]

## 2021-10-25 NOTE — TELEPHONE ENCOUNTER
Maria Luisa from [facility] called stating that she put in a referral to Gurjit for the pt's family. They  are trying to obtain a hospital bed for the pt.

## 2021-10-27 ENCOUNTER — TELEPHONE (OUTPATIENT)
Dept: PULMONOLOGY | Age: 66
End: 2021-10-27

## 2021-10-27 NOTE — TELEPHONE ENCOUNTER
Miryam Vincent RN from [facility] called asking for confirmation that Dr. Desean Patricio would be able to follow patient in hospice care, and also for the diagnosis that he would like to use for hospice.     Please call Frieda Noel   [phone]

## 2024-01-29 NOTE — PROGRESS NOTES
Continue to work on healthy diet and exercise as tolerated.  Follow up pending lab results.  Follow up in 6 months, or sooner if problems or concerns.       Medicare Wellness  Personal Prevention Plan of Service     Date of Office Visit:    Encounter Provider:  SHE Orozco  Place of Service:  [facility]  Patient Name: Brigid Carballo  :  1946    As part of the Medicare Wellness portion of your visit today, we are providing you with this personalized preventive plan of services (PPPS). This plan is based upon recommendations of the [place] Preventive Services Task Force (USPSTF) and the Advisory Committee on Immunization Practices (ACIP).    This lists the preventive care services that should be considered, and provides dates of when you are due. Items listed as completed are up-to-date and do not require any further intervention.    Health Maintenance   Topic Date Due    DXA SCAN  Never done    TDAP/TD VACCINES (1 - Tdap) Never done    INFLUENZA VACCINE  2023    ANNUAL WELLNESS VISIT  2023    LIPID PANEL  2023    COVID-19 Vaccine (1) 2025 (Originally 1947)    ZOSTER VACCINE (1 of 2) 2025 (Originally 10/30/1994)    BMI FOLLOWUP  2025    HEPATITIS C SCREENING  Completed    Pneumococcal Vaccine 65+  Completed       Orders Placed This Encounter   Procedures    DEXA Bone Density Axial     Standing Status:   Future     Standing Expiration Date:   2025     Order Specific Question:   Reason for Exam:     Answer:   postmenopause     Order Specific Question:   Release to patient     Answer:   Routine Release [[id number]]    Fluzone High-Dose 65+yrs    Comprehensive Metabolic Panel     Order Specific Question:   Release to patient     Answer:   Routine Release [[id number]]    Lipid Panel With LDL / HDL Ratio     Order Specific Question:   Release to patient     Answer:   Routine Release [[id number]]    Vitamin B12 & Folate     Order  Occupational Therapy   Occupational Therapy Initial Assessment and Tx  Date: 10/19/2021   Patient Name: Divine Mckeon  MRN: [id number]     : 1955    Date of Service: 10/19/2021    Discharge Recommendations: Divine Mckeon scored a 19/24 on the AM-PAC ADL Inpatient form. Current research shows that an AM-PAC score of 17 or less is typically not associated with a discharge to the patient's home setting. Based on the patient's AM-PAC score and their current ADL deficits, it is recommended that the patient have 3-5 sessions per week of Occupational Therapy at d/c to increase the patient's independence. Please see assessment section for further patient specific details. If patient discharges prior to next session this note will serve as a discharge summary. Please see below for the latest assessment towards goals. OT Equipment Recommendations  Equipment Needed: Yes  Mobility Devices: ADL Assistive Devices  ADL Assistive Devices: Toileting - 3-in-1 Commode; Shower Chair with back    Assessment   Performance deficits / Impairments: Decreased functional mobility ; Decreased ADL status; Decreased endurance;Decreased high-level IADLs  Assessment: Pt from home with spouse, pt with R BKA, admit with hypoxia. Pt currently requires SPVN with all fx transfers, ADLs, sitting EOB, transfer to [facility]. Steady no LOB. Pt reporting ambulatory prior to admission, has steps to enter apartment, stating can possibly move to first floor. Pt limited this date by O2 and endurance. O2 satting low 80s with activity. Rec BSC and shower chair if dc home. Requires 24hr hands on assist and spvn. Despite higher ampac score may benefit from cont inpt therapy.   Treatment Diagnosis: impaired mobility, transfers, ADL  Decision Making: Medium Complexity  OT Education: OT Role;Plan of Care;ADL Adaptive Strategies;Transfer Training;Energy Conservation  Patient Education: cont to reinforce- energy conservation, transfers  REQUIRES OT FOLLOW UP: Yes  Activity Tolerance  Activity Tolerance: Patient Tolerated treatment well;Treatment limited secondary to medical complications (free text)  Activity Tolerance: O2 80-90% in session  Safety Devices  Safety Devices in place: Yes  Type of devices: All fall risk precautions in place; Bed alarm in place;Call light within reach; Patient at risk for falls;Nurse notified; Left in bed           Patient Diagnosis(es): The primary encounter diagnosis was Acute on chronic respiratory failure with hypoxia ([facility] [address] A diagnosis of SOB (shortness of breath) on exertion was also pertinent to this visit. has a past medical history of Amputation of leg below knee, right, traumatic, complicated, subsequent encounter, Confirmed pulmonary tuberculosis, MTB (Mycobacterium tuberculosis infection), and Pulmonary fibrosis ([facility] [address] has a past surgical history that includes CT NEEDLE BIOPSY LUNG PERCUTANEOUS (5/21/2021); CT GUIDED CHEST TUBE (5/21/2021); bronchoscopy (N/A, 6/23/2021); and bronchoscopy (6/23/2021). Treatment Diagnosis: impaired mobility, transfers, ADL      Restrictions  Position Activity Restriction  Other position/activity restrictions: up with assist    Subjective   General  Chart Reviewed: Yes  Additional Pertinent Hx: 74 y.o. male, who was admitted with acute/chronic hypoxic resp failure. hx of active pulmonary TB and IPF (follows with Dr. Deepak Knight). Over the last few days he has been experiencing SOB and mild cough for which he presented to [facility] ED. Referring Practitioner: Cait Bañuelos MD  Diagnosis: hypoxia  Subjective  Subjective:  In bed upon arrival, agreeable to session  Patient Currently in Pain: Denies  Pain Assessment  Pain Assessment: 0-10  Pain Level: 0  Vital Signs  Temp: 97.5 °F (36.4 °C)  Temp Source: Oral  Pulse: 96  Heart Rate Source: Monitor  Resp: 20  BP: 101/66  BP Location: Left upper arm  MAP (mmHg): 78  Patient Position: Semi fowlers  Level of Consciousness: Alert Specific Question:   Release to patient     Answer:   Routine Release [[id number]]    Vitamin D,25-Hydroxy     Order Specific Question:   Release to patient     Answer:   Routine Release [[id number]]    Lipase     Order Specific Question:   Release to patient     Answer:   Routine Release [[id number]]    CBC & Differential     Order Specific Question:   Manual Differential     Answer:   No     Order Specific Question:   Release to patient     Answer:   Routine Release [[id number]]       Return in about 6 months (around 7/29/2024) for Recheck.           (0)  MEWS Score: 1  Patient Currently in Pain: Denies  Oxygen Therapy  SpO2: 94 %  Pulse Oximeter Device Mode: Intermittent  Pulse Oximeter Device Location: Finger  O2 Device: Nasal cannula  O2 Flow Rate (L/min): 4 L/min  Social/Functional History  Social/Functional History  Lives With: Spouse (wife available all the time)  Type of Home:  ([facility])  Home Layout: One level  Home Access: Level entry (15 steps to 2nd floor apt (wife assists); state they can stay in first floor apt)  Bathroom Shower/Tub: Tub/Shower unit  Bathroom Toilet: Standard  Home Equipment: Oxygen, Rolling walker, Wheelchair-manual (home 02 3-4L baseline; R BKA prosthesis)  ADL Assistance:  (assist per wife in/out shower)  Homemaking Assistance:  (wife/facility perform)  Ambulation Assistance:  (independent with RW and prosthesis)  Transfer Assistance: Independent  Active : No  Additional Comments: Pt denies any recent falls.        Objective        Orientation  Overall Orientation Status: Within Functional Limits     Balance  Sitting Balance: Independent  Standing Balance: Supervision  Standing Balance  Time: ~1 min  Activity: bed to BSC, sit<>stand LB dressing  Functional Mobility  Functional - Mobility Device: No device  Assist Level: Supervision  Functional Mobility Comments: SPVN stand pivot BSC, sit<>stand clothing manageent LB dressing  Toilet Transfers  Toilet - Technique: Stand pivot  Equipment Used: Standard bedside commode  Toilet Transfer: Supervision  ADL  Grooming: Supervision  UE Bathing: Supervision  LE Bathing: Supervision  UE Dressing: Stand by assistance  LE Dressing: Stand by assistance  Toileting: Supervision  Additional Comments: Pt completing ADLs seated in bed        Bed mobility  Supine to Sit: Independent  Sit to Supine: Independent  Scooting: Independent  Transfers  Stand Pivot Transfers: Supervision  Sit to stand: Supervision  Stand to sit: Supervision     Cognition  Overall Cognitive Status: [facility] LUE AROM (degrees)  LUE AROM : WFL  Left Hand AROM (degrees)  Left Hand AROM: WFL  RUE AROM (degrees)  RUE AROM : WFL  Right Hand AROM (degrees)  Right Hand AROM: WFL  LUE Strength  Gross LUE Strength: WFL  RUE Strength  Gross RUE Strength: WFL                   Plan   Plan  Times per week: 2-5  Times per day: Daily      AM-PAC Score        AM-[facility] Inpatient Daily Activity Raw Score: 19 (10/19/21 0917)  AM-PAC Inpatient ADL T-Scale Score : 40.22 (10/19/21 0917)  ADL Inpatient CMS 0-100% Score: 42.8 (10/19/21 0917)  ADL Inpatient CMS G-Code Modifier : CK (10/19/21 0917)    Goals  Short term goals  Time Frame for Short term goals: d/c  Short term goal 1: sit<>stand Khadra  Short term goal 2: Fx mobility and transfers Khadra (prosthesis and AD)  Short term goal 3: Toileting Khadra  Short term goal 4: Grooming Khadra       Therapy Time   Individual Concurrent Group Co-treatment   Time In 0825         Time Out 0903         Minutes 38              Timed Code Treatment Minutes:    25  Total Treatment Minutes:  [address] [employer], OT

## 2024-02-13 NOTE — CARE COORDINATION
Automated message suggests non-working number. However, immediately [facility] received callback from an unidentified male who stated only \"I received a call from this number. Shaaron Money Shaaron Money Shaaron Money ? \"  ACM introduced self, asked to whom [facility] is speaking with   Individual simply stated \"we just left from [id number], thank you. \"  Attempted to further clarify if individual is the intended & explained before further discussion, in order to protect pt privacy - it is necessary to identify with whom [facility] is speaking. Individual kept repeating, \"thank you, I will tell my nephew\"  Attempted to offer , if needed.  service was offered d/t though the individual spoke English, articulation and apparent disconnect in communication suggested potential that English is second language for the individual.  Individual simply kept saying, \"ok thank you, I will tell my nephew. \"    [facility] attempted offering this outreach is at request of a PCP within [address]. Requested to speak w/intended. Due to ambiguity of the person who returned this [facility]'s outreach, it is uncertain whether [facility] was speaking with the pt or an alternate contact. Unable to confirm identity. Invited individual to have someone call [facility]. Caller stated only \"ok\" & abruptly ended call.
Calm/Appropriate

## 2024-07-30 NOTE — PLAN OF CARE
Problem: Respiratory  Goal: No pulmonary complications  Outcome: Ongoing  Goal: O2 Sat > 90%  Note: Lungs diminished with fine crackles. Frequent non-productive cough. SpO2 % on 2L/NC. Problem: Nutritional:  Goal: Nutritional status will improve  Description: Nutritional status will improve  Outcome: Ongoing  Intervention: Monitor nutritional status  Note: Decreased PO intake at home with weight loss. Dietician consult was placed. Problem: Fluid Volume:  Goal: Ability to achieve a balanced intake and output will improve  Description: Ability to achieve a balanced intake and output will improve  Outcome: Ongoing  Intervention: Assess signs and symptoms of electrolyte imbalance  Note: Monitoring Sodium levels Q6 hours. Last draw was 127. Patient educated on fluid restriction. Will monitor. Problem: Falls - Risk of:  Goal: Will remain free from falls  Description: Will remain free from falls  Outcome: Ongoing  Note: Patient is a medium fall risk. Fall precautions in place and bed alarm in use. Bed in locked and low position. No

## (undated) DEVICE — MASK CAPNOGRAPHY AD W35IN DIA58IN SAMP LN L10FT O2 LN

## (undated) DEVICE — Z DISCONTINUED USE 2749457 TUBING SAMP AD W12.5XH8.4IN D9.1IN NSL ORAL SMRT CAPNOLINE

## (undated) DEVICE — BRUSH CYTO BRIST DIA1MM SHTH L140CM CHN 2MM PULM BRONCHSCP